# Patient Record
Sex: FEMALE | Race: WHITE | Employment: FULL TIME | URBAN - METROPOLITAN AREA
[De-identification: names, ages, dates, MRNs, and addresses within clinical notes are randomized per-mention and may not be internally consistent; named-entity substitution may affect disease eponyms.]

---

## 2017-01-11 PROCEDURE — 86317 IMMUNOASSAY INFECTIOUS AGENT: CPT | Performed by: INTERNAL MEDICINE

## 2017-01-11 PROCEDURE — 36415 COLL VENOUS BLD VENIPUNCTURE: CPT | Performed by: INTERNAL MEDICINE

## 2017-01-11 PROCEDURE — 86706 HEP B SURFACE ANTIBODY: CPT | Performed by: INTERNAL MEDICINE

## 2017-01-11 PROCEDURE — 86803 HEPATITIS C AB TEST: CPT | Performed by: INTERNAL MEDICINE

## 2017-01-11 PROCEDURE — 87340 HEPATITIS B SURFACE AG IA: CPT | Performed by: INTERNAL MEDICINE

## 2017-01-16 ENCOUNTER — HOSPITAL ENCOUNTER (OUTPATIENT)
Dept: CT IMAGING | Facility: HOSPITAL | Age: 42
Discharge: HOME | End: 2017-01-16
Attending: INTERNAL MEDICINE
Payer: COMMERCIAL

## 2017-01-16 DIAGNOSIS — R91.8 MULTIPLE PULMONARY NODULES: ICD-10-CM

## 2017-01-16 PROCEDURE — 71250 CT THORAX DX C-: CPT

## 2017-01-20 ENCOUNTER — LAB ENCOUNTER (OUTPATIENT)
Dept: LAB | Age: 42
End: 2017-01-20
Attending: INTERNAL MEDICINE
Payer: COMMERCIAL

## 2017-01-20 DIAGNOSIS — Z79.899 LONG-TERM USE OF HIGH-RISK MEDICATION: ICD-10-CM

## 2017-01-20 DIAGNOSIS — M31.30 NECROTIZING GRANULOMATOUS INFLAMMATION OF LUNG (HCC): ICD-10-CM

## 2017-01-20 LAB
ALBUMIN SERPL-MCNC: 3 G/DL (ref 3.5–4.8)
ALP LIVER SERPL-CCNC: 119 U/L (ref 37–98)
ALT SERPL-CCNC: 25 U/L (ref 14–54)
AST SERPL-CCNC: 17 U/L (ref 15–41)
BASOPHILS # BLD AUTO: 0.08 X10(3) UL (ref 0–0.1)
BASOPHILS NFR BLD AUTO: 0.8 %
BILIRUB SERPL-MCNC: 0.3 MG/DL (ref 0.1–2)
BUN BLD-MCNC: 9 MG/DL (ref 8–20)
CALCIUM BLD-MCNC: 8.7 MG/DL (ref 8.3–10.3)
CHLORIDE: 103 MMOL/L (ref 101–111)
CO2: 27 MMOL/L (ref 22–32)
CREAT BLD-MCNC: 0.89 MG/DL (ref 0.55–1.02)
EOSINOPHIL # BLD AUTO: 0.3 X10(3) UL (ref 0–0.3)
EOSINOPHIL NFR BLD AUTO: 2.8 %
ERYTHROCYTE [DISTWIDTH] IN BLOOD BY AUTOMATED COUNT: 13.2 % (ref 11.5–16)
GLUCOSE BLD-MCNC: 116 MG/DL (ref 70–99)
HCT VFR BLD AUTO: 38.3 % (ref 34–50)
HGB BLD-MCNC: 12.4 G/DL (ref 12–16)
IMMATURE GRANULOCYTE COUNT: 0.07 X10(3) UL (ref 0–1)
IMMATURE GRANULOCYTE RATIO %: 0.7 %
LYMPHOCYTES # BLD AUTO: 2.67 X10(3) UL (ref 0.9–4)
LYMPHOCYTES NFR BLD AUTO: 25.1 %
M PROTEIN MFR SERPL ELPH: 7.7 G/DL (ref 6.1–8.3)
MCH RBC QN AUTO: 29.8 PG (ref 27–33.2)
MCHC RBC AUTO-ENTMCNC: 32.4 G/DL (ref 31–37)
MCV RBC AUTO: 92.1 FL (ref 81–100)
MONOCYTES # BLD AUTO: 1.16 X10(3) UL (ref 0.1–0.6)
MONOCYTES NFR BLD AUTO: 10.9 %
NEUTROPHIL ABS PRELIM: 6.34 X10 (3) UL (ref 1.3–6.7)
NEUTROPHILS # BLD AUTO: 6.34 X10(3) UL (ref 1.3–6.7)
NEUTROPHILS NFR BLD AUTO: 59.7 %
PLATELET # BLD AUTO: 514 10(3)UL (ref 150–450)
POTASSIUM SERPL-SCNC: 4.3 MMOL/L (ref 3.6–5.1)
RBC # BLD AUTO: 4.16 X10(6)UL (ref 3.8–5.1)
RED CELL DISTRIBUTION WIDTH-SD: 45.1 FL (ref 35.1–46.3)
SODIUM SERPL-SCNC: 138 MMOL/L (ref 136–144)
WBC # BLD AUTO: 10.6 X10(3) UL (ref 4–13)

## 2017-01-20 PROCEDURE — 85025 COMPLETE CBC W/AUTO DIFF WBC: CPT

## 2017-01-20 PROCEDURE — 36415 COLL VENOUS BLD VENIPUNCTURE: CPT

## 2017-01-20 PROCEDURE — 80053 COMPREHEN METABOLIC PANEL: CPT

## 2017-01-23 RX ORDER — PYRIDOXINE HCL (VITAMIN B6) 50 MG
TABLET ORAL DAILY
COMMUNITY
End: 2017-11-21

## 2017-01-26 ENCOUNTER — HOSPITAL ENCOUNTER (EMERGENCY)
Facility: HOSPITAL | Age: 42
Discharge: HOME | End: 2017-01-26
Attending: EMERGENCY MEDICINE
Payer: COMMERCIAL

## 2017-01-26 VITALS
TEMPERATURE: 98 F | HEIGHT: 70 IN | OXYGEN SATURATION: 97 % | HEART RATE: 97 BPM | RESPIRATION RATE: 16 BRPM | WEIGHT: 199.94 LBS | BODY MASS INDEX: 28.62 KG/M2 | DIASTOLIC BLOOD PRESSURE: 67 MMHG | SYSTOLIC BLOOD PRESSURE: 97 MMHG

## 2017-01-26 DIAGNOSIS — T78.40XA ALLERGIC REACTION, INITIAL ENCOUNTER: Primary | ICD-10-CM

## 2017-01-26 PROCEDURE — 96374 THER/PROPH/DIAG INJ IV PUSH: CPT

## 2017-01-26 PROCEDURE — 99284 EMERGENCY DEPT VISIT MOD MDM: CPT

## 2017-01-26 PROCEDURE — 96375 TX/PRO/DX INJ NEW DRUG ADDON: CPT

## 2017-01-26 PROCEDURE — S0028 INJECTION, FAMOTIDINE, 20 MG: HCPCS

## 2017-01-26 RX ORDER — DIPHENHYDRAMINE HYDROCHLORIDE 50 MG/ML
25 INJECTION INTRAMUSCULAR; INTRAVENOUS ONCE
Status: COMPLETED | OUTPATIENT
Start: 2017-01-26 | End: 2017-01-26

## 2017-01-26 RX ORDER — METHYLPREDNISOLONE SODIUM SUCCINATE 125 MG/2ML
INJECTION, POWDER, LYOPHILIZED, FOR SOLUTION INTRAMUSCULAR; INTRAVENOUS
Status: COMPLETED
Start: 2017-01-26 | End: 2017-01-26

## 2017-01-26 RX ORDER — DIPHENHYDRAMINE HYDROCHLORIDE 50 MG/ML
25 INJECTION INTRAMUSCULAR; INTRAVENOUS ONCE
Status: DISCONTINUED | OUTPATIENT
Start: 2017-01-26 | End: 2017-01-26

## 2017-01-26 RX ORDER — METHYLPREDNISOLONE SODIUM SUCCINATE 125 MG/2ML
125 INJECTION, POWDER, LYOPHILIZED, FOR SOLUTION INTRAMUSCULAR; INTRAVENOUS ONCE
Status: DISCONTINUED | OUTPATIENT
Start: 2017-01-26 | End: 2017-01-26

## 2017-01-26 RX ORDER — DIPHENHYDRAMINE HYDROCHLORIDE 50 MG/ML
INJECTION INTRAMUSCULAR; INTRAVENOUS
Status: DISCONTINUED
Start: 2017-01-26 | End: 2017-01-26

## 2017-01-26 RX ORDER — PREDNISONE 20 MG/1
40 TABLET ORAL DAILY
Qty: 10 TABLET | Refills: 0 | Status: SHIPPED | OUTPATIENT
Start: 2017-01-26 | End: 2017-01-31

## 2017-01-26 RX ORDER — FAMOTIDINE 10 MG/ML
20 INJECTION, SOLUTION INTRAVENOUS ONCE
Status: DISCONTINUED | OUTPATIENT
Start: 2017-01-26 | End: 2017-01-26

## 2017-01-26 RX ORDER — FAMOTIDINE 10 MG/ML
INJECTION, SOLUTION INTRAVENOUS
Status: DISCONTINUED
Start: 2017-01-26 | End: 2017-01-26

## 2017-01-26 RX ORDER — METHYLPREDNISOLONE SODIUM SUCCINATE 125 MG/2ML
125 INJECTION, POWDER, LYOPHILIZED, FOR SOLUTION INTRAMUSCULAR; INTRAVENOUS ONCE
Status: COMPLETED | OUTPATIENT
Start: 2017-01-26 | End: 2017-01-26

## 2017-01-26 RX ORDER — FAMOTIDINE 10 MG/ML
20 INJECTION, SOLUTION INTRAVENOUS ONCE
Status: COMPLETED | OUTPATIENT
Start: 2017-01-26 | End: 2017-01-26

## 2017-01-26 NOTE — ED NOTES
Pt lying flat on stretcher denies sob or throat swelling at this time. Pt states she would like something to eat and drink but cannot have glutten. States her top lip feels slightly swollen.  momm at bs

## 2017-01-26 NOTE — ED INITIAL ASSESSMENT (HPI)
Pt here with c/o taking an autoimmune med for 2wks when MD told pt to increase the dose, pt developed welts , sob, nauseated lip swelling. Pt took benadryl x 2tabs PTA.

## 2017-01-26 NOTE — ED PROVIDER NOTES
Patient Seen in: [facility] Emergency Department    History   Patient presents with:   Allergic Rxn Allergies (immune)    Stated Complaint: Allergic reaction    HPI    40-year-old female that comes the hospital the chief complaint of having an itchy Oral Tab,  Take 1 tablet (50 mg total) by mouth daily. Patient taking differently: Take 100 mg by mouth daily. predniSONE 10 MG Oral Tab,  Take 10 mg by mouth daily as needed.      acetaminophen 325 MG Oral Tab,  Take 325 mg by mouth every 6 (six) hour 16  Ht 177.8 cm (5' 10\")  Wt 90.7 kg  BMI 28.69 kg/m2  SpO2 97%  LMP 12/26/2016        Physical Exam    HEENT : NCAT, EOMI, PEERL, throat clear, neck supple, no JVD, trachea midline, No LAD  Heart: S1S2 normal. No murmurs, regular rate and rhythm  Lungs: days        Medications Prescribed:  Current Discharge Medication List    START taking these medications    ! ! predniSONE 20 MG Oral Tab  Take 2 tablets (40 mg total) by mouth daily. Qty: 10 tablet Refills: 0    !! - Potential duplicate medications found.

## 2017-02-01 ENCOUNTER — HOSPITAL ENCOUNTER (OUTPATIENT)
Dept: CT IMAGING | Facility: HOSPITAL | Age: 42
Discharge: HOME | End: 2017-02-01
Attending: INTERNAL MEDICINE
Payer: COMMERCIAL

## 2017-02-01 ENCOUNTER — APPOINTMENT (OUTPATIENT)
Dept: LAB | Facility: HOSPITAL | Age: 42
End: 2017-02-01
Attending: INTERNAL MEDICINE
Payer: COMMERCIAL

## 2017-02-01 VITALS
RESPIRATION RATE: 16 BRPM | OXYGEN SATURATION: 98 % | HEART RATE: 63 BPM | WEIGHT: 200 LBS | SYSTOLIC BLOOD PRESSURE: 100 MMHG | HEIGHT: 69 IN | DIASTOLIC BLOOD PRESSURE: 76 MMHG | BODY MASS INDEX: 29.62 KG/M2

## 2017-02-01 DIAGNOSIS — R91.8 LUNG MASS: Primary | ICD-10-CM

## 2017-02-01 DIAGNOSIS — R91.8 LUNG MASS: ICD-10-CM

## 2017-02-01 DIAGNOSIS — R91.8 PULMONARY NODULES: ICD-10-CM

## 2017-02-01 LAB
INR BLD: 0.94 (ref 0.89–1.12)
PSA SERPL DL<=0.01 NG/ML-MCNC: 12.8 SECONDS (ref 12.3–14.8)

## 2017-02-01 PROCEDURE — 71250 CT THORAX DX C-: CPT

## 2017-02-01 PROCEDURE — 36415 COLL VENOUS BLD VENIPUNCTURE: CPT

## 2017-02-01 PROCEDURE — 85610 PROTHROMBIN TIME: CPT

## 2017-02-01 RX ORDER — MIDAZOLAM HYDROCHLORIDE 1 MG/ML
1 INJECTION INTRAMUSCULAR; INTRAVENOUS EVERY 5 MIN PRN
Status: ACTIVE | OUTPATIENT
Start: 2017-02-01 | End: 2017-02-01

## 2017-02-01 RX ORDER — SODIUM CHLORIDE 9 MG/ML
INJECTION, SOLUTION INTRAVENOUS CONTINUOUS
Status: DISCONTINUED | OUTPATIENT
Start: 2017-02-01 | End: 2017-02-05

## 2017-02-01 RX ORDER — MIDAZOLAM HYDROCHLORIDE 1 MG/ML
INJECTION INTRAMUSCULAR; INTRAVENOUS
Status: DISCONTINUED
Start: 2017-02-01 | End: 2017-02-01 | Stop reason: WASHOUT

## 2017-02-01 RX ORDER — FLUMAZENIL 0.1 MG/ML
0.2 INJECTION, SOLUTION INTRAVENOUS AS NEEDED
Status: DISCONTINUED | OUTPATIENT
Start: 2017-02-01 | End: 2017-02-05

## 2017-02-01 RX ORDER — NALOXONE HYDROCHLORIDE 0.4 MG/ML
80 INJECTION, SOLUTION INTRAMUSCULAR; INTRAVENOUS; SUBCUTANEOUS AS NEEDED
Status: DISCONTINUED | OUTPATIENT
Start: 2017-02-01 | End: 2017-02-05

## 2017-02-01 NOTE — IMAGING NOTE
Pt states she had a lung bx done 8/2016 and did so without any sedation. She would like to proceed and do this one without sedation as well, Dr Leeanna Henderson and Dr Jolene Coe at bedside and agree.  I will stay in CT in case the pt changes her mind or needs pain me

## 2017-03-07 ENCOUNTER — TELEPHONE (OUTPATIENT)
Dept: HEMATOLOGY/ONCOLOGY | Facility: HOSPITAL | Age: 42
End: 2017-03-07

## 2017-03-22 PROBLEM — M31.30 WEGENER'S GRANULOMATOSIS: Status: ACTIVE | Noted: 2017-03-22

## 2017-03-29 ENCOUNTER — OFFICE VISIT (OUTPATIENT)
Dept: HEMATOLOGY/ONCOLOGY | Facility: HOSPITAL | Age: 42
End: 2017-03-29
Attending: INTERNAL MEDICINE
Payer: COMMERCIAL

## 2017-03-29 VITALS
BODY MASS INDEX: 30.62 KG/M2 | OXYGEN SATURATION: 96 % | WEIGHT: 202 LBS | SYSTOLIC BLOOD PRESSURE: 108 MMHG | HEIGHT: 68 IN | TEMPERATURE: 99 F | DIASTOLIC BLOOD PRESSURE: 61 MMHG | HEART RATE: 108 BPM | RESPIRATION RATE: 18 BRPM

## 2017-03-29 DIAGNOSIS — M31.30 WEGENER'S GRANULOMATOSIS: Primary | ICD-10-CM

## 2017-03-29 LAB
BASOPHILS # BLD AUTO: 0.04 X10(3) UL (ref 0–0.1)
BASOPHILS NFR BLD AUTO: 0.5 %
EOSINOPHIL # BLD AUTO: 0.2 X10(3) UL (ref 0–0.3)
EOSINOPHIL NFR BLD AUTO: 2.3 %
ERYTHROCYTE [DISTWIDTH] IN BLOOD BY AUTOMATED COUNT: 14.1 % (ref 11.5–16)
HCT VFR BLD AUTO: 40.7 % (ref 34–50)
HGB BLD-MCNC: 13.5 G/DL (ref 12–16)
IMMATURE GRANULOCYTE COUNT: 0.04 X10(3) UL (ref 0–1)
IMMATURE GRANULOCYTE RATIO %: 0.5 %
LYMPHOCYTES # BLD AUTO: 2.63 X10(3) UL (ref 0.9–4)
LYMPHOCYTES NFR BLD AUTO: 30.2 %
MCH RBC QN AUTO: 30.5 PG (ref 27–33.2)
MCHC RBC AUTO-ENTMCNC: 33.2 G/DL (ref 31–37)
MCV RBC AUTO: 91.9 FL (ref 81–100)
MONOCYTES # BLD AUTO: 0.53 X10(3) UL (ref 0.1–0.6)
MONOCYTES NFR BLD AUTO: 6.1 %
NEUTROPHIL ABS PRELIM: 5.26 X10 (3) UL (ref 1.3–6.7)
NEUTROPHILS # BLD AUTO: 5.26 X10(3) UL (ref 1.3–6.7)
NEUTROPHILS NFR BLD AUTO: 60.4 %
PLATELET # BLD AUTO: 412 10(3)UL (ref 150–450)
RBC # BLD AUTO: 4.43 X10(6)UL (ref 3.8–5.1)
RED CELL DISTRIBUTION WIDTH-SD: 47.4 FL (ref 35.1–46.3)
WBC # BLD AUTO: 8.7 X10(3) UL (ref 4–13)

## 2017-03-29 PROCEDURE — 96413 CHEMO IV INFUSION 1 HR: CPT

## 2017-03-29 PROCEDURE — 96375 TX/PRO/DX INJ NEW DRUG ADDON: CPT

## 2017-03-29 PROCEDURE — 85025 COMPLETE CBC W/AUTO DIFF WBC: CPT

## 2017-03-29 PROCEDURE — 96376 TX/PRO/DX INJ SAME DRUG ADON: CPT

## 2017-03-29 PROCEDURE — 36415 COLL VENOUS BLD VENIPUNCTURE: CPT

## 2017-03-29 PROCEDURE — 96415 CHEMO IV INFUSION ADDL HR: CPT

## 2017-03-29 RX ORDER — DIPHENHYDRAMINE HYDROCHLORIDE 50 MG/ML
50 INJECTION INTRAMUSCULAR; INTRAVENOUS ONCE
Status: COMPLETED | OUTPATIENT
Start: 2017-03-29 | End: 2017-03-29

## 2017-03-29 RX ORDER — ACETAMINOPHEN 325 MG/1
650 TABLET ORAL ONCE
Status: COMPLETED | OUTPATIENT
Start: 2017-03-29 | End: 2017-03-29

## 2017-03-29 RX ORDER — DIPHENHYDRAMINE HCL 25 MG
25 CAPSULE ORAL ONCE
Status: COMPLETED | OUTPATIENT
Start: 2017-03-29 | End: 2017-03-29

## 2017-03-29 RX ADMIN — DIPHENHYDRAMINE HCL 25 MG: 25 MG CAPSULE ORAL at 09:30:00

## 2017-03-29 RX ADMIN — DIPHENHYDRAMINE HYDROCHLORIDE 25 MG: 50 INJECTION INTRAMUSCULAR; INTRAVENOUS at 11:43:00

## 2017-03-29 RX ADMIN — ACETAMINOPHEN 650 MG: 325 TABLET ORAL at 09:30:00

## 2017-03-29 NOTE — PROGRESS NOTES
1 hour 20 mins into rituxan infusion patient complained of itching and ears were red. Rituxan infusion stopped. VSS 98.9  20  130/85  96 Call placed to Dr. Skyler Zhao.  10 minutes later patient complains of feeling cold and patient looked flushed.    100 mg Solu

## 2017-04-12 ENCOUNTER — OFFICE VISIT (OUTPATIENT)
Dept: HEMATOLOGY/ONCOLOGY | Facility: HOSPITAL | Age: 42
End: 2017-04-12
Attending: INTERNAL MEDICINE
Payer: COMMERCIAL

## 2017-04-12 VITALS
OXYGEN SATURATION: 98 % | BODY MASS INDEX: 30.62 KG/M2 | TEMPERATURE: 98 F | HEART RATE: 93 BPM | SYSTOLIC BLOOD PRESSURE: 141 MMHG | RESPIRATION RATE: 18 BRPM | HEIGHT: 67.99 IN | WEIGHT: 202 LBS | DIASTOLIC BLOOD PRESSURE: 79 MMHG

## 2017-04-12 DIAGNOSIS — M31.30 WEGENER'S GRANULOMATOSIS: Primary | ICD-10-CM

## 2017-04-12 PROCEDURE — 96375 TX/PRO/DX INJ NEW DRUG ADDON: CPT

## 2017-04-12 PROCEDURE — 96413 CHEMO IV INFUSION 1 HR: CPT

## 2017-04-12 PROCEDURE — 85025 COMPLETE CBC W/AUTO DIFF WBC: CPT

## 2017-04-12 PROCEDURE — 96415 CHEMO IV INFUSION ADDL HR: CPT

## 2017-04-12 PROCEDURE — 36415 COLL VENOUS BLD VENIPUNCTURE: CPT

## 2017-04-12 RX ORDER — DIPHENHYDRAMINE HCL 25 MG
25 CAPSULE ORAL ONCE
Status: COMPLETED | OUTPATIENT
Start: 2017-04-12 | End: 2017-04-12

## 2017-04-12 RX ORDER — DIPHENHYDRAMINE HYDROCHLORIDE 50 MG/ML
25 INJECTION INTRAMUSCULAR; INTRAVENOUS ONCE
Status: DISCONTINUED | OUTPATIENT
Start: 2017-04-12 | End: 2017-04-12

## 2017-04-12 RX ORDER — METHYLPREDNISOLONE SODIUM SUCCINATE 125 MG/2ML
150 INJECTION, POWDER, LYOPHILIZED, FOR SOLUTION INTRAMUSCULAR; INTRAVENOUS ONCE
Status: COMPLETED | OUTPATIENT
Start: 2017-04-12 | End: 2017-04-12

## 2017-04-12 RX ORDER — ACETAMINOPHEN 325 MG/1
650 TABLET ORAL ONCE
Status: COMPLETED | OUTPATIENT
Start: 2017-04-12 | End: 2017-04-12

## 2017-04-12 RX ADMIN — METHYLPREDNISOLONE SODIUM SUCCINATE 150 MG: 125 INJECTION, POWDER, LYOPHILIZED, FOR SOLUTION INTRAMUSCULAR; INTRAVENOUS at 10:33:00

## 2017-04-12 RX ADMIN — ACETAMINOPHEN 650 MG: 325 TABLET ORAL at 10:32:00

## 2017-04-12 RX ADMIN — DIPHENHYDRAMINE HCL 25 MG: 25 MG CAPSULE ORAL at 10:32:00

## 2017-06-07 ENCOUNTER — APPOINTMENT (OUTPATIENT)
Dept: GENERAL RADIOLOGY | Age: 42
End: 2017-06-07
Attending: FAMILY MEDICINE
Payer: COMMERCIAL

## 2017-06-07 ENCOUNTER — HOSPITAL ENCOUNTER (OUTPATIENT)
Age: 42
Discharge: HOME | End: 2017-06-07
Attending: FAMILY MEDICINE
Payer: COMMERCIAL

## 2017-06-07 VITALS
HEIGHT: 69 IN | RESPIRATION RATE: 16 BRPM | OXYGEN SATURATION: 96 % | SYSTOLIC BLOOD PRESSURE: 127 MMHG | HEART RATE: 96 BPM | DIASTOLIC BLOOD PRESSURE: 73 MMHG | WEIGHT: 200 LBS | TEMPERATURE: 100 F | BODY MASS INDEX: 29.62 KG/M2

## 2017-06-07 DIAGNOSIS — J02.9 ACUTE PHARYNGITIS, UNSPECIFIED ETIOLOGY: ICD-10-CM

## 2017-06-07 DIAGNOSIS — J20.9 ACUTE BRONCHITIS, UNSPECIFIED ORGANISM: ICD-10-CM

## 2017-06-07 DIAGNOSIS — H66.90 ACUTE OTITIS MEDIA, UNSPECIFIED LATERALITY, UNSPECIFIED OTITIS MEDIA TYPE: Primary | ICD-10-CM

## 2017-06-07 PROCEDURE — 80047 BASIC METABLC PNL IONIZED CA: CPT

## 2017-06-07 PROCEDURE — 85025 COMPLETE CBC W/AUTO DIFF WBC: CPT | Performed by: FAMILY MEDICINE

## 2017-06-07 PROCEDURE — 87430 STREP A AG IA: CPT | Performed by: FAMILY MEDICINE

## 2017-06-07 PROCEDURE — 70360 X-RAY EXAM OF NECK: CPT | Performed by: FAMILY MEDICINE

## 2017-06-07 PROCEDURE — 99204 OFFICE O/P NEW MOD 45 MIN: CPT

## 2017-06-07 PROCEDURE — 99214 OFFICE O/P EST MOD 30 MIN: CPT

## 2017-06-07 PROCEDURE — 87081 CULTURE SCREEN ONLY: CPT | Performed by: FAMILY MEDICINE

## 2017-06-07 PROCEDURE — 71020 XR CHEST PA + LAT CHEST (CPT=71020): CPT | Performed by: FAMILY MEDICINE

## 2017-06-07 PROCEDURE — 36415 COLL VENOUS BLD VENIPUNCTURE: CPT

## 2017-06-07 PROCEDURE — 96372 THER/PROPH/DIAG INJ SC/IM: CPT

## 2017-06-07 RX ORDER — LEVOFLOXACIN 500 MG/1
500 TABLET, FILM COATED ORAL DAILY
Qty: 10 TABLET | Refills: 0 | Status: SHIPPED | OUTPATIENT
Start: 2017-06-07 | End: 2017-06-17

## 2017-06-07 RX ORDER — KETOROLAC TROMETHAMINE 30 MG/ML
30 INJECTION, SOLUTION INTRAMUSCULAR; INTRAVENOUS ONCE
Status: COMPLETED | OUTPATIENT
Start: 2017-06-07 | End: 2017-06-07

## 2017-06-07 RX ORDER — IBUPROFEN 600 MG/1
600 TABLET ORAL EVERY 8 HOURS PRN
Qty: 30 TABLET | Refills: 0 | Status: SHIPPED | OUTPATIENT
Start: 2017-06-07 | End: 2017-06-14

## 2017-06-07 NOTE — ED PROVIDER NOTES
Patient Seen in: [address] [facility]    History   Patient presents with:  Sore Throat  Sinus Problem    Stated Complaint: sore throat x 14 days sinus congestion    HPI    Blanca Doug is a 39year old female presents with chi Medications :   levofloxacin (LEVAQUIN) 500 MG Oral Tab,  Take 1 tablet (500 mg total) by mouth daily. ibuprofen 600 MG Oral Tab,  Take 1 tablet (600 mg total) by mouth every 8 (eight) hours as needed for Pain or Fever.    Cyanocobalamin (B-12) 25 %   O2 Device 06/07/17 1527 None (Room air)       Current:/73 mmHg  Pulse 113  Temp(Src) 100 °F (37.8 °C) (Temporal)  Resp 18  Ht 175.3 cm (5' 9\")  Wt 90.719 kg  BMI 29.52 kg/m2  SpO2 96%        Physical Exam   Constitutional: She appears well-devel pain.  Push fluids. If is any worsening throat pain, difficulty swallowing, drooling, opening the mother recommended to go to the nearest ER. Follow with primary care physician in 3-4 days for recheck.           Disposition and Plan     Clinical Impressio

## 2017-06-07 NOTE — ED INITIAL ASSESSMENT (HPI)
The patient is here with complaints of sinus congestion, a sore throat, and some ear discomfort. She did just got off of an ear plane for 9 hours and isn't sure if the ear pain is associated with the plane ride.   She saw the doctor on the cruise ship and

## 2017-07-15 ENCOUNTER — HOSPITAL ENCOUNTER (OUTPATIENT)
Age: 42
Discharge: HOME | End: 2017-07-15
Attending: FAMILY MEDICINE
Payer: COMMERCIAL

## 2017-07-15 VITALS
BODY MASS INDEX: 29.33 KG/M2 | WEIGHT: 198 LBS | OXYGEN SATURATION: 98 % | SYSTOLIC BLOOD PRESSURE: 119 MMHG | TEMPERATURE: 98 F | RESPIRATION RATE: 18 BRPM | DIASTOLIC BLOOD PRESSURE: 90 MMHG | HEART RATE: 94 BPM | HEIGHT: 69 IN

## 2017-07-15 DIAGNOSIS — H10.33 ACUTE CONJUNCTIVITIS OF BOTH EYES, UNSPECIFIED ACUTE CONJUNCTIVITIS TYPE: Primary | ICD-10-CM

## 2017-07-15 PROCEDURE — 99213 OFFICE O/P EST LOW 20 MIN: CPT

## 2017-07-15 PROCEDURE — 99214 OFFICE O/P EST MOD 30 MIN: CPT

## 2017-07-15 RX ORDER — TOBRAMYCIN 3 MG/ML
2 SOLUTION/ DROPS OPHTHALMIC EVERY 6 HOURS
Qty: 1 BOTTLE | Refills: 0 | Status: SHIPPED | OUTPATIENT
Start: 2017-07-15 | End: 2017-07-20

## 2017-07-15 NOTE — ED NOTES
[facility] called stating that they are out of stock of Tobramycin eye drops and wanted to substitute with Ofloxacin. Dr Onur Marrero approved the substitution of Ofloxacin 1-2 drops in both eyes every 6 hours for 5 days.

## 2017-07-15 NOTE — ED INITIAL ASSESSMENT (HPI)
Pt c/o bilateral eye pain, redness, swelling and irritation. Pt states that is started yesterday. Pt denies any trauma or injury. Pt states that she does wear contacts and has removed them. Pt is wearing her glasses.   Pt also noted yellow drainage from

## 2017-07-15 NOTE — ED PROVIDER NOTES
Patient Seen in: [address] [facility]    History   Patient presents with:  Eye Pain    Stated Complaint:     RASHAAD    Bryon Batista is a 39year old female presents with chief complaint of right eye redness associated with some g TAKE 1 TABLET BY MOUTH DAILY  Patient taking differently: TAKE 1 TABLET BY MOUTH DAILY PRN   Multiple Vitamins-Minerals (MULTIVITAMIN OR),  Take  by mouth. Cholecalciferol (VITAMIN D3) 5000 UNITS Oral Tab,  Take by mouth daily.      Levonorgest-Eth Milagros Rosas No data to display    ============================================================  ED Course  ------------------------------------------------------------  MDM   Finding consistent with acute conjunctivitis. Started on tobramycin eyedrops for 5 days.   Av

## 2017-09-25 ENCOUNTER — HOSPITAL ENCOUNTER (OUTPATIENT)
Dept: CT IMAGING | Age: 42
Discharge: HOME | End: 2017-09-25
Attending: INTERNAL MEDICINE
Payer: COMMERCIAL

## 2017-09-25 DIAGNOSIS — M31.30 WEGENER'S GRANULOMATOSIS: ICD-10-CM

## 2017-09-25 PROCEDURE — 71250 CT THORAX DX C-: CPT | Performed by: INTERNAL MEDICINE

## 2017-09-28 ENCOUNTER — TELEPHONE (OUTPATIENT)
Dept: FAMILY MEDICINE CLINIC | Facility: CLINIC | Age: 42
End: 2017-09-28

## 2017-09-28 NOTE — TELEPHONE ENCOUNTER
I received faxed labs from patient. These appear to have been done through work health screening. Platelets are mildly elevated at 383. LDL cholesterol mildly elevated at 109. Patient is due for complete physical.  Please schedule.

## 2017-09-29 PROCEDURE — 81001 URINALYSIS AUTO W/SCOPE: CPT | Performed by: INTERNAL MEDICINE

## 2017-09-29 PROCEDURE — 83876 ASSAY MYELOPEROXIDASE: CPT | Performed by: INTERNAL MEDICINE

## 2017-09-29 PROCEDURE — 84156 ASSAY OF PROTEIN URINE: CPT | Performed by: INTERNAL MEDICINE

## 2017-09-29 PROCEDURE — 82570 ASSAY OF URINE CREATININE: CPT | Performed by: INTERNAL MEDICINE

## 2017-10-26 ENCOUNTER — HOSPITAL ENCOUNTER (OUTPATIENT)
Age: 42
Discharge: HOME | End: 2017-10-26
Attending: FAMILY MEDICINE
Payer: COMMERCIAL

## 2017-10-26 ENCOUNTER — TELEPHONE (OUTPATIENT)
Dept: FAMILY MEDICINE CLINIC | Facility: CLINIC | Age: 42
End: 2017-10-26

## 2017-10-26 VITALS
OXYGEN SATURATION: 98 % | RESPIRATION RATE: 18 BRPM | DIASTOLIC BLOOD PRESSURE: 82 MMHG | WEIGHT: 195 LBS | TEMPERATURE: 98 F | HEART RATE: 87 BPM | SYSTOLIC BLOOD PRESSURE: 129 MMHG | BODY MASS INDEX: 29 KG/M2

## 2017-10-26 DIAGNOSIS — N60.01 BREAST CYST, RIGHT: ICD-10-CM

## 2017-10-26 DIAGNOSIS — Z87.898 PERSONAL HISTORY OF LYMPHATIC AND HEMATOPOIETIC NEOPLASM: Primary | ICD-10-CM

## 2017-10-26 DIAGNOSIS — J03.90 ACUTE TONSILLITIS, UNSPECIFIED ETIOLOGY: Primary | ICD-10-CM

## 2017-10-26 PROCEDURE — 99214 OFFICE O/P EST MOD 30 MIN: CPT

## 2017-10-26 PROCEDURE — 87430 STREP A AG IA: CPT | Performed by: FAMILY MEDICINE

## 2017-10-26 PROCEDURE — 87081 CULTURE SCREEN ONLY: CPT | Performed by: FAMILY MEDICINE

## 2017-10-26 PROCEDURE — 87430 STREP A AG IA: CPT

## 2017-10-26 RX ORDER — FLUTICASONE PROPIONATE 50 MCG
SPRAY, SUSPENSION (ML) NASAL DAILY
COMMUNITY
End: 2018-11-17 | Stop reason: ALTCHOICE

## 2017-10-26 RX ORDER — CETIRIZINE HYDROCHLORIDE, PSEUDOEPHEDRINE HYDROCHLORIDE 5; 120 MG/1; MG/1
1 TABLET, FILM COATED, EXTENDED RELEASE ORAL 2 TIMES DAILY
Qty: 20 TABLET | Refills: 0 | Status: SHIPPED | OUTPATIENT
Start: 2017-10-26 | End: 2017-11-21

## 2017-10-26 RX ORDER — AMOXICILLIN AND CLAVULANATE POTASSIUM 875; 125 MG/1; MG/1
1 TABLET, FILM COATED ORAL 2 TIMES DAILY
Qty: 20 TABLET | Refills: 0 | Status: SHIPPED | OUTPATIENT
Start: 2017-10-26 | End: 2017-11-05

## 2017-10-26 NOTE — TELEPHONE ENCOUNTER
I have left detailed msg on pt cell per hippa updating her on below. Asked to cb if questions.    Pt has mammo scheduled tomorrow

## 2017-10-26 NOTE — TELEPHONE ENCOUNTER
Fina-mammogram dept from [facility] to change order to bilat diagnostic with US (since due already)   Pt missed 6 mos f/u for R breast cyst (should have been done 5/2017, indicated for R breast cyst f/u)     Order pended    Her next OV for phys

## 2017-10-26 NOTE — ED INITIAL ASSESSMENT (HPI)
Pt c/o sore throat, body aches, sinus congestion, cough with productive light yellow to clear phlegm on occasion and \"fuzziness in ears\". Denies other symptoms.

## 2017-10-27 ENCOUNTER — HOSPITAL ENCOUNTER (OUTPATIENT)
Dept: MAMMOGRAPHY | Facility: HOSPITAL | Age: 42
Discharge: HOME | End: 2017-10-27
Attending: FAMILY MEDICINE
Payer: COMMERCIAL

## 2017-10-27 DIAGNOSIS — Z87.898 PERSONAL HISTORY OF LYMPHATIC AND HEMATOPOIETIC NEOPLASM: ICD-10-CM

## 2017-10-27 PROCEDURE — 77066 DX MAMMO INCL CAD BI: CPT | Performed by: FAMILY MEDICINE

## 2017-10-27 PROCEDURE — 76641 ULTRASOUND BREAST COMPLETE: CPT | Performed by: FAMILY MEDICINE

## 2017-10-27 PROCEDURE — 77062 BREAST TOMOSYNTHESIS BI: CPT | Performed by: FAMILY MEDICINE

## 2017-10-27 NOTE — ED PROVIDER NOTES
Patient Seen in: [address] [facility]    History   Patient presents with:  Sore Throat    Stated Complaint: sore throat, cough, body ache x4 days    HPI    Patient with a past medical history cinnamon for celiac disease, pleurisy, an Palpitations   • Cancer Neg    • Diabetes Brother        Smoking status: Never Smoker                                                              Smokeless tobacco: Never Used                      Alcohol use:  No                Review of Systems    Positi [address]on [address] [facility].  [phone]    Schedule an appointment as soon as possible for a visit  As needed, If symptoms worsen      Medications Prescribed:  Current Discharge Medication List    START taking these medicatio

## 2017-11-01 ENCOUNTER — APPOINTMENT (OUTPATIENT)
Dept: HEMATOLOGY/ONCOLOGY | Facility: HOSPITAL | Age: 42
End: 2017-11-01
Attending: INTERNAL MEDICINE
Payer: COMMERCIAL

## 2017-11-10 ENCOUNTER — TELEPHONE (OUTPATIENT)
Dept: HEMATOLOGY/ONCOLOGY | Facility: HOSPITAL | Age: 42
End: 2017-11-10

## 2017-11-10 ENCOUNTER — OFFICE VISIT (OUTPATIENT)
Dept: HEMATOLOGY/ONCOLOGY | Facility: HOSPITAL | Age: 42
End: 2017-11-10
Attending: INTERNAL MEDICINE
Payer: COMMERCIAL

## 2017-11-10 VITALS
WEIGHT: 198 LBS | DIASTOLIC BLOOD PRESSURE: 67 MMHG | OXYGEN SATURATION: 98 % | BODY MASS INDEX: 29.33 KG/M2 | HEIGHT: 69 IN | RESPIRATION RATE: 18 BRPM | SYSTOLIC BLOOD PRESSURE: 106 MMHG | TEMPERATURE: 98 F | HEART RATE: 92 BPM

## 2017-11-10 DIAGNOSIS — M31.30 NECROTIZING GRANULOMATOUS INFLAMMATION OF LUNG (HCC): ICD-10-CM

## 2017-11-10 DIAGNOSIS — Z79.899 LONG-TERM USE OF HIGH-RISK MEDICATION: ICD-10-CM

## 2017-11-10 LAB
ALBUMIN SERPL-MCNC: 3.2 G/DL (ref 3.5–4.8)
ALP LIVER SERPL-CCNC: 88 U/L (ref 37–98)
ALT SERPL-CCNC: 22 U/L (ref 14–54)
AST SERPL-CCNC: 8 U/L (ref 15–41)
BASOPHILS # BLD AUTO: 0.03 X10(3) UL (ref 0–0.1)
BASOPHILS NFR BLD AUTO: 0.2 %
BILIRUB SERPL-MCNC: 0.3 MG/DL (ref 0.1–2)
BUN BLD-MCNC: 8 MG/DL (ref 8–20)
CALCIUM BLD-MCNC: 9.3 MG/DL (ref 8.3–10.3)
CHLORIDE: 107 MMOL/L (ref 101–111)
CO2: 21 MMOL/L (ref 22–32)
CREAT BLD-MCNC: 0.82 MG/DL (ref 0.55–1.02)
EOSINOPHIL # BLD AUTO: 0.02 X10(3) UL (ref 0–0.3)
EOSINOPHIL NFR BLD AUTO: 0.1 %
ERYTHROCYTE [DISTWIDTH] IN BLOOD BY AUTOMATED COUNT: 13.1 % (ref 11.5–16)
GLUCOSE BLD-MCNC: 109 MG/DL (ref 70–99)
HCT VFR BLD AUTO: 39.9 % (ref 34–50)
HGB BLD-MCNC: 13.2 G/DL (ref 12–16)
IMMATURE GRANULOCYTE COUNT: 0.14 X10(3) UL (ref 0–1)
IMMATURE GRANULOCYTE RATIO %: 0.8 %
LYMPHOCYTES # BLD AUTO: 2.59 X10(3) UL (ref 0.9–4)
LYMPHOCYTES NFR BLD AUTO: 14 %
M PROTEIN MFR SERPL ELPH: 7.2 G/DL (ref 6.1–8.3)
MCH RBC QN AUTO: 30.4 PG (ref 27–33.2)
MCHC RBC AUTO-ENTMCNC: 33.1 G/DL (ref 31–37)
MCV RBC AUTO: 91.9 FL (ref 81–100)
MONOCYTES # BLD AUTO: 1.21 X10(3) UL (ref 0.1–0.6)
MONOCYTES NFR BLD AUTO: 6.5 %
NEUTROPHIL ABS PRELIM: 14.5 X10 (3) UL (ref 1.3–6.7)
NEUTROPHILS # BLD AUTO: 14.5 X10(3) UL (ref 1.3–6.7)
NEUTROPHILS NFR BLD AUTO: 78.4 %
PLATELET # BLD AUTO: 442 10(3)UL (ref 150–450)
POTASSIUM SERPL-SCNC: 3.8 MMOL/L (ref 3.6–5.1)
RBC # BLD AUTO: 4.34 X10(6)UL (ref 3.8–5.1)
RED CELL DISTRIBUTION WIDTH-SD: 44 FL (ref 35.1–46.3)
SODIUM SERPL-SCNC: 139 MMOL/L (ref 136–144)
WBC # BLD AUTO: 18.5 X10(3) UL (ref 4–13)

## 2017-11-10 PROCEDURE — 85025 COMPLETE CBC W/AUTO DIFF WBC: CPT

## 2017-11-10 PROCEDURE — 80053 COMPREHEN METABOLIC PANEL: CPT

## 2017-11-10 NOTE — TELEPHONE ENCOUNTER
*late telephone entry*    Attempted to contact pt multiple times, NA- need to know if she had dental procedure on 11/9 and if she will be receiving Rituxan on 11/10.

## 2017-11-13 NOTE — PROGRESS NOTES
Viewed by Guzman Mercer on 11/12/2017  7:41 AM   Written by Vinicius Epstein MD on 11/11/2017  9:56 PM

## 2017-11-17 ENCOUNTER — OFFICE VISIT (OUTPATIENT)
Dept: FAMILY MEDICINE CLINIC | Facility: CLINIC | Age: 42
End: 2017-11-17

## 2017-11-17 VITALS
HEIGHT: 68.5 IN | HEART RATE: 64 BPM | BODY MASS INDEX: 30.11 KG/M2 | WEIGHT: 201 LBS | DIASTOLIC BLOOD PRESSURE: 74 MMHG | RESPIRATION RATE: 12 BRPM | SYSTOLIC BLOOD PRESSURE: 118 MMHG

## 2017-11-17 DIAGNOSIS — E55.9 VITAMIN D DEFICIENCY: ICD-10-CM

## 2017-11-17 DIAGNOSIS — Z00.00 BLOOD TESTS FOR ROUTINE GENERAL PHYSICAL EXAMINATION: ICD-10-CM

## 2017-11-17 DIAGNOSIS — E53.8 VITAMIN B12 DEFICIENCY: ICD-10-CM

## 2017-11-17 DIAGNOSIS — Z00.00 ROUTINE GENERAL MEDICAL EXAMINATION AT A HEALTH CARE FACILITY: Primary | ICD-10-CM

## 2017-11-17 PROCEDURE — 99396 PREV VISIT EST AGE 40-64: CPT | Performed by: FAMILY MEDICINE

## 2017-11-17 NOTE — PROGRESS NOTES
CHIEF COMPLAINT   Complete physical  HPI:   Piyush Denis is a 43year old female who presents for a complete physical exam.   Taking vitamin D and vitamin B12 daily. Believes she is on 5,000 IU vitamin D daily. Following gluten free diet.     Dr. Jose Hernandez Rfl:    Multiple Minerals-Vitamins (CITRACAL PLUS) Oral Tab Take 2 tablets by mouth daily. Disp:  Rfl:    Oxybutynin Chloride (DITROPAN) 5 MG Oral Tab Take 1 tablet by mouth daily.  Disp: 90 tablet Rfl: 3   [facility] ALLERGY RELIEF 180 MG Oral Tab TAKE 1 TABLET B • Heart Disease Mother    • Diabetes Brother    • High Cholesterol Father      Hypercholesterolemia   • Heart Disorder Father      Palpitations   • Diabetes Brother    • Cancer Neg       Social History:   Smoking status: Never Smoker is 30.12 kg/m². Aaliyah Kern Recommended low fat diet and aerobic exercise 30 minutes three times weekly. The patient indicates understanding of these issues and agrees to the plan.

## 2017-11-18 ENCOUNTER — PATIENT MESSAGE (OUTPATIENT)
Dept: FAMILY MEDICINE CLINIC | Facility: CLINIC | Age: 42
End: 2017-11-18

## 2017-11-21 ENCOUNTER — OFFICE VISIT (OUTPATIENT)
Dept: HEMATOLOGY/ONCOLOGY | Facility: HOSPITAL | Age: 42
End: 2017-11-21
Attending: INTERNAL MEDICINE
Payer: COMMERCIAL

## 2017-11-21 VITALS
BODY MASS INDEX: 29.89 KG/M2 | WEIGHT: 199.5 LBS | RESPIRATION RATE: 18 BRPM | SYSTOLIC BLOOD PRESSURE: 124 MMHG | HEART RATE: 84 BPM | HEIGHT: 68.5 IN | OXYGEN SATURATION: 100 % | DIASTOLIC BLOOD PRESSURE: 79 MMHG

## 2017-11-21 DIAGNOSIS — M31.30 WEGENER'S GRANULOMATOSIS: ICD-10-CM

## 2017-11-21 DIAGNOSIS — M31.30 WEGENER'S DISEASE, PULMONARY: Primary | ICD-10-CM

## 2017-11-21 PROCEDURE — 36415 COLL VENOUS BLD VENIPUNCTURE: CPT

## 2017-11-21 PROCEDURE — 96415 CHEMO IV INFUSION ADDL HR: CPT

## 2017-11-21 PROCEDURE — 96413 CHEMO IV INFUSION 1 HR: CPT

## 2017-11-21 PROCEDURE — 85025 COMPLETE CBC W/AUTO DIFF WBC: CPT

## 2017-11-21 RX ORDER — DIPHENHYDRAMINE HCL 25 MG
25 CAPSULE ORAL ONCE
Status: CANCELLED | OUTPATIENT
Start: 2017-11-21

## 2017-11-21 RX ORDER — ACETAMINOPHEN 325 MG/1
650 TABLET ORAL ONCE
Status: COMPLETED | OUTPATIENT
Start: 2017-11-21 | End: 2017-11-21

## 2017-11-21 RX ORDER — DIPHENHYDRAMINE HCL 25 MG
25 CAPSULE ORAL ONCE
Status: COMPLETED | OUTPATIENT
Start: 2017-11-21 | End: 2017-11-21

## 2017-11-21 RX ORDER — ACETAMINOPHEN 325 MG/1
650 TABLET ORAL ONCE
Status: CANCELLED | OUTPATIENT
Start: 2017-11-21

## 2017-11-21 RX ADMIN — ACETAMINOPHEN 650 MG: 325 TABLET ORAL at 09:28:00

## 2017-11-21 RX ADMIN — DIPHENHYDRAMINE HCL 25 MG: 25 MG CAPSULE ORAL at 09:28:00

## 2017-11-21 NOTE — PROGRESS NOTES
Spoke w/ Dr. Heidy Archuleta office regarding clarification of orders. Will wait to see how patient does after today's infusion to determine future doses. Confirmed that no further appointments to be scheduled at this time. Patient understands.     Education Record

## 2017-11-21 NOTE — TELEPHONE ENCOUNTER
From: Luciana Henao  To: Flor Babin PA-C  Sent: 11/18/2017 8:03 PM CST  Subject: Other    Ryder Singh. ..  I realize that I didn't get the contact information for a pediatric cardiologist recommendation for my daughter on Friday before I lef

## 2017-12-27 PROBLEM — J04.30 SUPRAGLOTTITIS WITHOUT AIRWAY OBSTRUCTION: Status: ACTIVE | Noted: 2017-12-27

## 2017-12-27 PROBLEM — J02.9 SORE THROAT: Status: ACTIVE | Noted: 2017-12-27

## 2018-01-03 RX ORDER — OXYBUTYNIN CHLORIDE 5 MG/1
TABLET, EXTENDED RELEASE ORAL
Qty: 90 TABLET | Refills: 1 | OUTPATIENT
Start: 2018-01-03

## 2018-01-08 ENCOUNTER — TELEPHONE (OUTPATIENT)
Dept: UROLOGY | Facility: HOSPITAL | Age: 43
End: 2018-01-08

## 2018-01-08 RX ORDER — OXYBUTYNIN CHLORIDE 5 MG/1
5 TABLET ORAL DAILY
Qty: 90 TABLET | Refills: 3 | Status: SHIPPED | OUTPATIENT
Start: 2018-01-08 | End: 2018-01-08 | Stop reason: CLARIF

## 2018-01-08 RX ORDER — OXYBUTYNIN CHLORIDE 5 MG/1
5 TABLET, EXTENDED RELEASE ORAL DAILY
COMMUNITY
End: 2019-01-08

## 2018-01-08 NOTE — TELEPHONE ENCOUNTER
Pt phoned and asking why she needed to make appt for refills. Pt states everything is going fine. Discussed w/ pt that she hadn't seen Dr Tomas Bernstein in 2 1/2 yrs. Pt states she sees her gyne yearly.  Discussed if gyne was willing to call in oxybutynin, she didn't ne

## 2018-01-19 PROCEDURE — 86255 FLUORESCENT ANTIBODY SCREEN: CPT | Performed by: INTERNAL MEDICINE

## 2018-01-19 PROCEDURE — 83876 ASSAY MYELOPEROXIDASE: CPT | Performed by: INTERNAL MEDICINE

## 2018-01-26 PROBLEM — J03.91 RECURRENT TONSILLITIS: Status: ACTIVE | Noted: 2018-01-26

## 2018-01-29 RX ORDER — PREDNISONE 10 MG/1
10 TABLET ORAL DAILY PRN
COMMUNITY
End: 2019-03-25 | Stop reason: ALTCHOICE

## 2018-01-31 ENCOUNTER — TELEPHONE (OUTPATIENT)
Dept: FAMILY MEDICINE CLINIC | Facility: CLINIC | Age: 43
End: 2018-01-31

## 2018-01-31 NOTE — TELEPHONE ENCOUNTER
Incoming fax received from [address] [employer] [facility]. Medical Clearance request.  Patient is scheduled to have Tonsillectomy on 2/13/2018 with Dr. Manjeet Batista. Outgoing call to patient, LM to CB.

## 2018-02-08 ENCOUNTER — OFFICE VISIT (OUTPATIENT)
Dept: FAMILY MEDICINE CLINIC | Facility: CLINIC | Age: 43
End: 2018-02-08

## 2018-02-08 VITALS
TEMPERATURE: 98 F | RESPIRATION RATE: 18 BRPM | WEIGHT: 202 LBS | BODY MASS INDEX: 30.26 KG/M2 | HEIGHT: 68.5 IN | SYSTOLIC BLOOD PRESSURE: 98 MMHG | DIASTOLIC BLOOD PRESSURE: 66 MMHG | HEART RATE: 66 BPM

## 2018-02-08 DIAGNOSIS — Z01.818 PREOPERATIVE CLEARANCE: Primary | ICD-10-CM

## 2018-02-08 DIAGNOSIS — M31.30 WEGENER'S GRANULOMATOSIS: ICD-10-CM

## 2018-02-08 DIAGNOSIS — J04.30 SUPRAGLOTTITIS WITHOUT AIRWAY OBSTRUCTION: ICD-10-CM

## 2018-02-08 PROCEDURE — 99242 OFF/OP CONSLTJ NEW/EST SF 20: CPT | Performed by: FAMILY MEDICINE

## 2018-02-08 NOTE — PROGRESS NOTES
[facility] Family Medicine Office Note  Chief Complaint:   Patient presents with:  Pre-Op Exam: 2/13/18 tonsil removal       HPI:   This is a 43year old female coming in for preop clearance for tonsillectomy to be done on February 13, 2018 with Disorder Father      Palpitations   • Diabetes Brother    • Cancer Neg      Allergies:    Azathioprine            Rash    Comment:Nausea, diffuse, lip swelling, low BP  Gluten Flour                Comment:Gets very tired from gluten in foods and then will Pulse 66   Temp (!) 97.5 °F (36.4 °C) (Oral)   Resp 18   Ht 68.5\"   Wt 202 lb   LMP 01/29/2018 (Approximate)   BMI 30.27 kg/m²  Estimated body mass index is 30.27 kg/m² as calculated from the following:    Height as of this encounter: 68.5\".     Weight as Patient is notified to call with any questions, complications, allergies, or worsening or changing symptoms. Patient is to call with any side effects or complications from the treatments as a result of today.      Problem List:  Patient Active Problem List

## 2018-02-13 ENCOUNTER — ANESTHESIA (OUTPATIENT)
Dept: SURGERY | Facility: HOSPITAL | Age: 43
End: 2018-02-13
Payer: COMMERCIAL

## 2018-02-13 ENCOUNTER — HOSPITAL ENCOUNTER (OUTPATIENT)
Facility: HOSPITAL | Age: 43
Setting detail: HOSPITAL OUTPATIENT SURGERY
Discharge: HOME | End: 2018-02-13
Attending: OTOLARYNGOLOGY | Admitting: OTOLARYNGOLOGY
Payer: COMMERCIAL

## 2018-02-13 ENCOUNTER — ANESTHESIA EVENT (OUTPATIENT)
Dept: SURGERY | Facility: HOSPITAL | Age: 43
End: 2018-02-13
Payer: COMMERCIAL

## 2018-02-13 ENCOUNTER — SURGERY (OUTPATIENT)
Age: 43
End: 2018-02-13

## 2018-02-13 VITALS
HEART RATE: 83 BPM | HEIGHT: 68 IN | DIASTOLIC BLOOD PRESSURE: 82 MMHG | TEMPERATURE: 98 F | OXYGEN SATURATION: 100 % | RESPIRATION RATE: 16 BRPM | WEIGHT: 198 LBS | BODY MASS INDEX: 30.01 KG/M2 | SYSTOLIC BLOOD PRESSURE: 118 MMHG

## 2018-02-13 DIAGNOSIS — J03.91 RECURRENT TONSILLITIS: ICD-10-CM

## 2018-02-13 DIAGNOSIS — J02.9 SORE THROAT: ICD-10-CM

## 2018-02-13 DIAGNOSIS — J04.30 SUPRAGLOTTITIS WITHOUT AIRWAY OBSTRUCTION: ICD-10-CM

## 2018-02-13 LAB
POCT LOT NUMBER: NORMAL
POCT URINE PREGNANCY: NEGATIVE

## 2018-02-13 PROCEDURE — 81025 URINE PREGNANCY TEST: CPT | Performed by: OTOLARYNGOLOGY

## 2018-02-13 PROCEDURE — 88304 TISSUE EXAM BY PATHOLOGIST: CPT | Performed by: OTOLARYNGOLOGY

## 2018-02-13 PROCEDURE — 0C5PXZZ DESTRUCTION OF TONSILS, EXTERNAL APPROACH: ICD-10-PCS | Performed by: OTOLARYNGOLOGY

## 2018-02-13 RX ORDER — ONDANSETRON 2 MG/ML
4 INJECTION INTRAMUSCULAR; INTRAVENOUS AS NEEDED
Status: DISCONTINUED | OUTPATIENT
Start: 2018-02-13 | End: 2018-02-13

## 2018-02-13 RX ORDER — MEPERIDINE HYDROCHLORIDE 25 MG/ML
12.5 INJECTION INTRAMUSCULAR; INTRAVENOUS; SUBCUTANEOUS AS NEEDED
Status: DISCONTINUED | OUTPATIENT
Start: 2018-02-13 | End: 2018-02-13

## 2018-02-13 RX ORDER — SCOLOPAMINE TRANSDERMAL SYSTEM 1 MG/1
PATCH, EXTENDED RELEASE TRANSDERMAL
Status: DISCONTINUED
Start: 2018-02-13 | End: 2018-02-13

## 2018-02-13 RX ORDER — BUPIVACAINE HYDROCHLORIDE 2.5 MG/ML
INJECTION, SOLUTION EPIDURAL; INFILTRATION; INTRACAUDAL AS NEEDED
Status: DISCONTINUED | OUTPATIENT
Start: 2018-02-13 | End: 2018-02-13 | Stop reason: HOSPADM

## 2018-02-13 RX ORDER — NALOXONE HYDROCHLORIDE 0.4 MG/ML
80 INJECTION, SOLUTION INTRAMUSCULAR; INTRAVENOUS; SUBCUTANEOUS AS NEEDED
Status: DISCONTINUED | OUTPATIENT
Start: 2018-02-13 | End: 2018-02-13

## 2018-02-13 RX ORDER — DEXAMETHASONE 2 MG/1
TABLET ORAL
Qty: 10 TABLET | Refills: 0 | Status: SHIPPED | OUTPATIENT
Start: 2018-02-13 | End: 2018-11-17 | Stop reason: ALTCHOICE

## 2018-02-13 RX ORDER — MIDAZOLAM HYDROCHLORIDE 1 MG/ML
1 INJECTION INTRAMUSCULAR; INTRAVENOUS EVERY 5 MIN PRN
Status: DISCONTINUED | OUTPATIENT
Start: 2018-02-13 | End: 2018-02-13

## 2018-02-13 RX ORDER — SODIUM CHLORIDE, SODIUM LACTATE, POTASSIUM CHLORIDE, CALCIUM CHLORIDE 600; 310; 30; 20 MG/100ML; MG/100ML; MG/100ML; MG/100ML
INJECTION, SOLUTION INTRAVENOUS CONTINUOUS
Status: DISCONTINUED | OUTPATIENT
Start: 2018-02-13 | End: 2018-02-13

## 2018-02-13 RX ORDER — SCOLOPAMINE TRANSDERMAL SYSTEM 1 MG/1
1 PATCH, EXTENDED RELEASE TRANSDERMAL
Status: DISCONTINUED | OUTPATIENT
Start: 2018-02-13 | End: 2018-02-13

## 2018-02-13 NOTE — OPERATIVE REPORT
[address] Patient Status:  Hospital Outpatient Surgery    1975 MRN [id number]   [facility] SURGERY Attending Antolin Aleman MD   [facility] Day # 0 PCP Katelyn Mancia MD     Tonsillectomy Op Note  Pre-Op Anusha without complications. The sponge, needle and instrument counts were correct at the end of the case. There were no complications. I performed all parts of this procedure.   EBL:  5cc  IVF:  100cc LR  Specimens:  Bilateral tonsils to path  UO: None  Condi

## 2018-02-13 NOTE — ANESTHESIA POSTPROCEDURE EVALUATION
[address] Patient Status:  Hospital Outpatient Surgery   Age/Gender 43year old female MRN [id number]   Location [address] [place] [address] Attending Live Estevez MD   [facility] Day # 0 PCP Herbert Rico,

## 2018-02-13 NOTE — DISCHARGE SUMMARY
Surgeon Discharge Summary     Patient ID:  Mason Castrejon  [id number]  43year old  9/12/1975    Admit date: 2/13/2018    Discharge date and time: No discharge date for patient encounter.      Attending Physician: Kelly Knight MD     Reason for admis

## 2018-02-13 NOTE — ANESTHESIA PREPROCEDURE EVALUATION
PRE-OP EVALUATION    Patient Name: Genesis Murphy    Pre-op Diagnosis: Sore throat [J02.9]  Recurrent tonsillitis [J03.91]  Supraglottitis without airway obstruction [J04.30]    Procedure(s):  BILATERAL TONSILLECTOMY    Surgeon(s) and Role:     Prosper Cannon Evaluation    Patient summary reviewed. Anesthetic Complications  (-) history of anesthetic complications         GI/Hepatic/Renal    Negative GI/hepatic/renal ROS. Cardiovascular    Negative cardiovascular ROS.   ECG reviewed surgeon and patient.       Plan/risks discussed with: patient                Present on Admission:  **None**

## 2018-03-09 ENCOUNTER — OFFICE VISIT (OUTPATIENT)
Dept: UROLOGY | Facility: HOSPITAL | Age: 43
End: 2018-03-09
Attending: OBSTETRICS & GYNECOLOGY
Payer: COMMERCIAL

## 2018-03-09 ENCOUNTER — HOSPITAL ENCOUNTER (OUTPATIENT)
Dept: CT IMAGING | Facility: HOSPITAL | Age: 43
End: 2018-03-09
Attending: INTERNAL MEDICINE
Payer: COMMERCIAL

## 2018-03-09 VITALS
HEIGHT: 68 IN | BODY MASS INDEX: 29.1 KG/M2 | WEIGHT: 192 LBS | DIASTOLIC BLOOD PRESSURE: 70 MMHG | SYSTOLIC BLOOD PRESSURE: 110 MMHG

## 2018-03-09 DIAGNOSIS — N39.41 URGE INCONTINENCE: Primary | ICD-10-CM

## 2018-03-09 DIAGNOSIS — N81.84 PELVIC MUSCLE WASTING: ICD-10-CM

## 2018-03-09 PROCEDURE — 99211 OFF/OP EST MAY X REQ PHY/QHP: CPT

## 2018-03-09 NOTE — PROGRESS NOTES
Patient presents to follow up OAB    She is currently using oxybutynin ER 5mg daily    She reports +improvement and reports minimal dry mouth and denies constipation  Denies other significant side effects     Happy with improvements    Voids freely  Bowels

## 2018-04-06 ENCOUNTER — HOSPITAL ENCOUNTER (OUTPATIENT)
Dept: CT IMAGING | Facility: HOSPITAL | Age: 43
Discharge: HOME | End: 2018-04-06
Attending: INTERNAL MEDICINE
Payer: COMMERCIAL

## 2018-04-06 DIAGNOSIS — M31.30 WEGENER'S GRANULOMATOSIS: ICD-10-CM

## 2018-04-06 DIAGNOSIS — R91.1 LUNG NODULE: ICD-10-CM

## 2018-04-06 PROCEDURE — 71250 CT THORAX DX C-: CPT | Performed by: INTERNAL MEDICINE

## 2018-04-20 PROCEDURE — 86255 FLUORESCENT ANTIBODY SCREEN: CPT | Performed by: INTERNAL MEDICINE

## 2018-04-20 PROCEDURE — 83876 ASSAY MYELOPEROXIDASE: CPT | Performed by: INTERNAL MEDICINE

## 2018-05-11 ENCOUNTER — TELEPHONE (OUTPATIENT)
Dept: FAMILY MEDICINE CLINIC | Facility: CLINIC | Age: 43
End: 2018-05-11

## 2018-05-11 DIAGNOSIS — R79.89 ABNORMAL CBC: Primary | ICD-10-CM

## 2018-05-11 NOTE — TELEPHONE ENCOUNTER
Received faxed labs from [employer] from health screening. Platelets mildly elevated. Recheck CBC in the next month.

## 2018-05-15 NOTE — TELEPHONE ENCOUNTER
Call to pt's cell reaches identified voice mail. per hipaa consent left vmm req call back to triage nurse tomorrow for nelsy GOLDSTEIN comments re test results and instructions. Provided ofc phone hours. [facility] future lab order placed.

## 2018-05-18 ENCOUNTER — RT VISIT (OUTPATIENT)
Dept: RESPIRATORY THERAPY | Facility: HOSPITAL | Age: 43
End: 2018-05-18
Attending: INTERNAL MEDICINE
Payer: COMMERCIAL

## 2018-05-18 DIAGNOSIS — R91.8 PULMONARY NODULES: ICD-10-CM

## 2018-05-18 PROCEDURE — 94729 DIFFUSING CAPACITY: CPT

## 2018-05-18 PROCEDURE — 94726 PLETHYSMOGRAPHY LUNG VOLUMES: CPT

## 2018-05-18 PROCEDURE — 94010 BREATHING CAPACITY TEST: CPT

## 2018-05-22 NOTE — PROCEDURES
Spirometry, flow volume loop, lung volumes are all within normal limits. No evidence of restriction or obstruction.    There is mild increased RV / TLC ratio with normal TLC , suggestive of mild air trapping   The diffusing capacity is normal

## 2018-07-29 ENCOUNTER — HOSPITAL ENCOUNTER (OUTPATIENT)
Age: 43
Discharge: HOME | End: 2018-07-29
Attending: FAMILY MEDICINE
Payer: COMMERCIAL

## 2018-07-29 VITALS
WEIGHT: 192 LBS | RESPIRATION RATE: 18 BRPM | SYSTOLIC BLOOD PRESSURE: 118 MMHG | OXYGEN SATURATION: 98 % | BODY MASS INDEX: 29 KG/M2 | HEART RATE: 94 BPM | DIASTOLIC BLOOD PRESSURE: 78 MMHG | TEMPERATURE: 99 F

## 2018-07-29 DIAGNOSIS — N39.0 URINARY TRACT INFECTION WITH HEMATURIA, SITE UNSPECIFIED: Primary | ICD-10-CM

## 2018-07-29 DIAGNOSIS — R31.9 URINARY TRACT INFECTION WITH HEMATURIA, SITE UNSPECIFIED: Primary | ICD-10-CM

## 2018-07-29 LAB
POCT BILIRUBIN URINE: NEGATIVE
POCT GLUCOSE URINE: NEGATIVE MG/DL
POCT KETONE URINE: NEGATIVE MG/DL
POCT NITRITE URINE: NEGATIVE
POCT PH URINE: 5.5 (ref 5–8)
POCT PROTEIN URINE: NEGATIVE MG/DL
POCT SPECIFIC GRAVITY URINE: 1.01
POCT URINE COLOR: YELLOW
POCT URINE PREGNANCY: NEGATIVE
POCT UROBILINOGEN URINE: 0.2 MG/DL

## 2018-07-29 PROCEDURE — 87086 URINE CULTURE/COLONY COUNT: CPT | Performed by: FAMILY MEDICINE

## 2018-07-29 PROCEDURE — 99214 OFFICE O/P EST MOD 30 MIN: CPT

## 2018-07-29 PROCEDURE — 81025 URINE PREGNANCY TEST: CPT | Performed by: FAMILY MEDICINE

## 2018-07-29 PROCEDURE — 81002 URINALYSIS NONAUTO W/O SCOPE: CPT | Performed by: FAMILY MEDICINE

## 2018-07-29 RX ORDER — NITROFURANTOIN 25; 75 MG/1; MG/1
100 CAPSULE ORAL 2 TIMES DAILY
Qty: 20 CAPSULE | Refills: 0 | Status: SHIPPED | OUTPATIENT
Start: 2018-07-29 | End: 2018-08-08

## 2018-07-29 NOTE — ED PROVIDER NOTES
Patient Seen in: [address] [facility]    History   Patient presents with:  Urinary Symptoms (urologic)    Stated Complaint: UTI x 1 day    HPI  49-year-old female presents to immediate care with a 1 day history of frequency and mild ED Triage Vitals [07/29/18 1321]  BP: 118/78  Pulse: 94  Resp: 18  Temp: 98.6 °F (37 °C)  Temp src: Temporal  SpO2: 98 %  O2 Device: None (Room air)    Current:/78   Pulse 94   Temp 98.6 °F (37 °C) (Temporal)   Resp 18   Wt 87.1 kg   SpO2 98%   BMI antibiotics. Otherwise, take all your medications until completed. See your doctor in 2-3 days if not better.     Disposition:  Discharge  7/29/2018  1:33 pm    Follow-up:  Shorty Howell MD  [address] [place] [address] [phone]

## 2018-08-13 ENCOUNTER — LAB ENCOUNTER (OUTPATIENT)
Dept: LAB | Age: 43
End: 2018-08-13
Attending: FAMILY MEDICINE
Payer: COMMERCIAL

## 2018-08-13 DIAGNOSIS — M31.30 WEGENER'S GRANULOMATOSIS: ICD-10-CM

## 2018-08-13 DIAGNOSIS — E55.9 VITAMIN D DEFICIENCY: ICD-10-CM

## 2018-08-13 DIAGNOSIS — R79.89 ABNORMAL CBC: ICD-10-CM

## 2018-08-13 DIAGNOSIS — E53.8 VITAMIN B12 DEFICIENCY: ICD-10-CM

## 2018-08-13 LAB
ALBUMIN SERPL-MCNC: 3.1 G/DL (ref 3.5–4.8)
ALBUMIN/GLOB SERPL: 0.8 {RATIO} (ref 1–2)
ALP LIVER SERPL-CCNC: 88 U/L (ref 37–98)
ALT SERPL-CCNC: 22 U/L (ref 14–54)
ANION GAP SERPL CALC-SCNC: 6 MMOL/L (ref 0–18)
AST SERPL-CCNC: 13 U/L (ref 15–41)
BASOPHILS # BLD AUTO: 0.04 X10(3) UL (ref 0–0.1)
BASOPHILS NFR BLD AUTO: 0.4 %
BILIRUB SERPL-MCNC: 0.2 MG/DL (ref 0.1–2)
BUN BLD-MCNC: 9 MG/DL (ref 8–20)
BUN/CREAT SERPL: 13.2 (ref 10–20)
CALCIUM BLD-MCNC: 8.7 MG/DL (ref 8.3–10.3)
CHLORIDE SERPL-SCNC: 107 MMOL/L (ref 101–111)
CO2 SERPL-SCNC: 28 MMOL/L (ref 22–32)
CREAT BLD-MCNC: 0.68 MG/DL (ref 0.55–1.02)
EOSINOPHIL # BLD AUTO: 0.17 X10(3) UL (ref 0–0.3)
EOSINOPHIL NFR BLD AUTO: 1.8 %
ERYTHROCYTE [DISTWIDTH] IN BLOOD BY AUTOMATED COUNT: 15.3 % (ref 11.5–16)
GLOBULIN PLAS-MCNC: 3.7 G/DL (ref 2.5–3.7)
GLUCOSE BLD-MCNC: 76 MG/DL (ref 70–99)
HAV AB SERPL IA-ACNC: 491 PG/ML (ref 193–986)
HCT VFR BLD AUTO: 40.6 % (ref 34–50)
HGB BLD-MCNC: 12.5 G/DL (ref 12–16)
IMMATURE GRANULOCYTE COUNT: 0.03 X10(3) UL (ref 0–1)
IMMATURE GRANULOCYTE RATIO %: 0.3 %
LYMPHOCYTES # BLD AUTO: 2.26 X10(3) UL (ref 0.9–4)
LYMPHOCYTES NFR BLD AUTO: 23.9 %
M PROTEIN MFR SERPL ELPH: 6.8 G/DL (ref 6.1–8.3)
MCH RBC QN AUTO: 29.8 PG (ref 27–33.2)
MCHC RBC AUTO-ENTMCNC: 30.8 G/DL (ref 31–37)
MCV RBC AUTO: 96.9 FL (ref 81–100)
MONOCYTES # BLD AUTO: 1.08 X10(3) UL (ref 0.1–1)
MONOCYTES NFR BLD AUTO: 11.4 %
NEUTROPHIL ABS PRELIM: 5.86 X10 (3) UL (ref 1.3–6.7)
NEUTROPHILS # BLD AUTO: 5.86 X10(3) UL (ref 1.3–6.7)
NEUTROPHILS NFR BLD AUTO: 62.2 %
OSMOLALITY SERPL CALC.SUM OF ELEC: 289 MOSM/KG (ref 275–295)
PLATELET # BLD AUTO: 379 10(3)UL (ref 150–450)
POTASSIUM SERPL-SCNC: 4 MMOL/L (ref 3.6–5.1)
RBC # BLD AUTO: 4.19 X10(6)UL (ref 3.8–5.1)
RED CELL DISTRIBUTION WIDTH-SD: 54 FL (ref 35.1–46.3)
SODIUM SERPL-SCNC: 141 MMOL/L (ref 136–144)
VIT D+METAB SERPL-MCNC: 56.3 NG/ML (ref 30–100)
WBC # BLD AUTO: 9.4 X10(3) UL (ref 4–13)

## 2018-08-13 PROCEDURE — 82306 VITAMIN D 25 HYDROXY: CPT | Performed by: FAMILY MEDICINE

## 2018-08-13 PROCEDURE — 82607 VITAMIN B-12: CPT | Performed by: FAMILY MEDICINE

## 2018-09-24 ENCOUNTER — HOSPITAL ENCOUNTER (OUTPATIENT)
Dept: CT IMAGING | Age: 43
Discharge: HOME | End: 2018-09-24
Attending: INTERNAL MEDICINE
Payer: COMMERCIAL

## 2018-09-24 DIAGNOSIS — J84.9 ILD (INTERSTITIAL LUNG DISEASE) (HCC): ICD-10-CM

## 2018-09-24 PROCEDURE — 71250 CT THORAX DX C-: CPT | Performed by: INTERNAL MEDICINE

## 2018-11-17 ENCOUNTER — HOSPITAL ENCOUNTER (OUTPATIENT)
Age: 43
Discharge: HOME | End: 2018-11-17
Attending: FAMILY MEDICINE
Payer: COMMERCIAL

## 2018-11-17 VITALS
BODY MASS INDEX: 31.1 KG/M2 | HEIGHT: 69 IN | RESPIRATION RATE: 18 BRPM | OXYGEN SATURATION: 98 % | TEMPERATURE: 100 F | SYSTOLIC BLOOD PRESSURE: 116 MMHG | WEIGHT: 210 LBS | DIASTOLIC BLOOD PRESSURE: 74 MMHG | HEART RATE: 79 BPM

## 2018-11-17 DIAGNOSIS — H60.501 ACUTE OTITIS EXTERNA OF RIGHT EAR, UNSPECIFIED TYPE: Primary | ICD-10-CM

## 2018-11-17 DIAGNOSIS — H69.83 EUSTACHIAN TUBE DYSFUNCTION, BILATERAL: ICD-10-CM

## 2018-11-17 PROCEDURE — 99213 OFFICE O/P EST LOW 20 MIN: CPT

## 2018-11-17 PROCEDURE — 99214 OFFICE O/P EST MOD 30 MIN: CPT

## 2018-11-17 RX ORDER — CIPROFLOXACIN AND DEXAMETHASONE 3; 1 MG/ML; MG/ML
4 SUSPENSION/ DROPS AURICULAR (OTIC) 2 TIMES DAILY
Qty: 1 BOTTLE | Refills: 0 | Status: SHIPPED | OUTPATIENT
Start: 2018-11-17 | End: 2018-11-24

## 2018-11-18 NOTE — ED PROVIDER NOTES
Patient Seen in: [address] [facility]    History   Patient presents with:  Ear Pain    Stated Complaint: CLOGGED EARS , EARS ARE SWOLLEN    HPI    19-year-old female presents for bilateral ear pain.   States she has dryness and itchin HPI.  Constitutional and vital signs reviewed. All other systems reviewed and negative except as noted above.     Physical Exam     ED Triage Vitals   BP 11/17/18 0924 116/74   Pulse 11/17/18 0924 79   Resp 11/17/18 0924 18   Temp 11/17/18 0929 100.4 ° bilateral    Disposition:  Discharge  11/17/2018  9:33 am    Follow-up:  Miguel Clemons MD  [address] [place] [address] [phone]    In 3 days  If symptoms worsen        Medications Prescribed:  Discharge Medication List as of 11/1

## 2018-11-29 ENCOUNTER — HOSPITAL ENCOUNTER (OUTPATIENT)
Age: 43
Discharge: HOME | End: 2018-11-29
Attending: FAMILY MEDICINE
Payer: COMMERCIAL

## 2018-11-29 VITALS
TEMPERATURE: 98 F | OXYGEN SATURATION: 99 % | HEART RATE: 110 BPM | RESPIRATION RATE: 16 BRPM | DIASTOLIC BLOOD PRESSURE: 94 MMHG | SYSTOLIC BLOOD PRESSURE: 133 MMHG

## 2018-11-29 DIAGNOSIS — B96.89 BACTERIAL VAGINITIS: Primary | ICD-10-CM

## 2018-11-29 DIAGNOSIS — N30.01 ACUTE CYSTITIS WITH HEMATURIA: ICD-10-CM

## 2018-11-29 DIAGNOSIS — N76.0 BACTERIAL VAGINITIS: Primary | ICD-10-CM

## 2018-11-29 PROCEDURE — 81025 URINE PREGNANCY TEST: CPT | Performed by: FAMILY MEDICINE

## 2018-11-29 PROCEDURE — 87077 CULTURE AEROBIC IDENTIFY: CPT | Performed by: FAMILY MEDICINE

## 2018-11-29 PROCEDURE — 81002 URINALYSIS NONAUTO W/O SCOPE: CPT | Performed by: FAMILY MEDICINE

## 2018-11-29 PROCEDURE — 87510 GARDNER VAG DNA DIR PROBE: CPT | Performed by: FAMILY MEDICINE

## 2018-11-29 PROCEDURE — 99214 OFFICE O/P EST MOD 30 MIN: CPT

## 2018-11-29 PROCEDURE — 87491 CHLMYD TRACH DNA AMP PROBE: CPT | Performed by: FAMILY MEDICINE

## 2018-11-29 PROCEDURE — 87591 N.GONORRHOEAE DNA AMP PROB: CPT | Performed by: FAMILY MEDICINE

## 2018-11-29 PROCEDURE — 87660 TRICHOMONAS VAGIN DIR PROBE: CPT | Performed by: FAMILY MEDICINE

## 2018-11-29 PROCEDURE — 87480 CANDIDA DNA DIR PROBE: CPT | Performed by: FAMILY MEDICINE

## 2018-11-29 PROCEDURE — 87086 URINE CULTURE/COLONY COUNT: CPT | Performed by: FAMILY MEDICINE

## 2018-11-29 RX ORDER — PHENAZOPYRIDINE HYDROCHLORIDE 200 MG/1
200 TABLET, FILM COATED ORAL 3 TIMES DAILY PRN
Qty: 6 TABLET | Refills: 0 | Status: SHIPPED | OUTPATIENT
Start: 2018-11-29 | End: 2018-12-02

## 2018-11-29 RX ORDER — METRONIDAZOLE 500 MG/1
500 TABLET ORAL 2 TIMES DAILY
Qty: 14 TABLET | Refills: 0 | Status: SHIPPED | OUTPATIENT
Start: 2018-11-29 | End: 2018-12-06

## 2018-11-29 RX ORDER — NITROFURANTOIN 25; 75 MG/1; MG/1
100 CAPSULE ORAL 2 TIMES DAILY
Qty: 10 CAPSULE | Refills: 0 | Status: SHIPPED | OUTPATIENT
Start: 2018-11-29 | End: 2018-12-03

## 2018-11-30 RX ORDER — AMOXICILLIN 875 MG/1
875 TABLET, COATED ORAL 2 TIMES DAILY
Qty: 14 TABLET | Refills: 0 | Status: SHIPPED | OUTPATIENT
Start: 2018-11-30 | End: 2018-12-07

## 2018-11-30 NOTE — ED PROVIDER NOTES
Patient Seen in: [facility]    History   Patient presents with:  Urinary Symptoms (urologic)    Stated Complaint: possible uti    HPI    63-year-old female with history of celiac disease, Wegener's vasculitis, and status po reviewed and is not pertinent to presenting problem.     Social History    Tobacco Use      Smoking status: Never Smoker      Smokeless tobacco: Never Used    Alcohol use: No    Drug use: No      Review of Systems    Positive for stated complaint: possible leukocytes and moderate blood. Her vaginal exam is consistent with BV. Will start patient on Macrobid for possible UTI–urine culture sent. We will also start metronidazole for bacterial vaginosis. GC, chlamydia, and vaginitis panel also sent.

## 2018-12-03 ENCOUNTER — HOSPITAL ENCOUNTER (OUTPATIENT)
Age: 43
Discharge: HOME | End: 2018-12-03
Attending: FAMILY MEDICINE
Payer: COMMERCIAL

## 2018-12-03 VITALS
DIASTOLIC BLOOD PRESSURE: 72 MMHG | BODY MASS INDEX: 31.1 KG/M2 | HEART RATE: 93 BPM | OXYGEN SATURATION: 96 % | SYSTOLIC BLOOD PRESSURE: 108 MMHG | WEIGHT: 210 LBS | HEIGHT: 69 IN | RESPIRATION RATE: 18 BRPM | TEMPERATURE: 98 F

## 2018-12-03 DIAGNOSIS — A60.00 GENITAL HERPES SIMPLEX, UNSPECIFIED SITE: Primary | ICD-10-CM

## 2018-12-03 PROCEDURE — 99214 OFFICE O/P EST MOD 30 MIN: CPT

## 2018-12-03 PROCEDURE — 87529 HSV DNA AMP PROBE: CPT | Performed by: FAMILY MEDICINE

## 2018-12-03 RX ORDER — VALACYCLOVIR HYDROCHLORIDE 1 G/1
1 TABLET, FILM COATED ORAL 3 TIMES DAILY
Qty: 21 TABLET | Refills: 0 | Status: SHIPPED | OUTPATIENT
Start: 2018-12-03 | End: 2018-12-10

## 2018-12-03 NOTE — ED INITIAL ASSESSMENT (HPI)
Was here on 11/29/18. Back here today with sore around the vaginal area, noticed on Saturday. Currently taking Amoxicillin and Flagyl. Concerned about HSV.

## 2018-12-03 NOTE — ED NOTES
Patient called and stated that she noticed \"canker sore\" like sores around her vaginal area. Patient is concerned that it may be herpes. Patient told that she would need to be reevaluated by a physician.   HSV cultures may or may not be obtained to conf

## 2018-12-05 NOTE — ED PROVIDER NOTES
Patient Seen in: [facility]    History   Patient presents with:  Marybeth-KRISTIN (gynecologic)    Stated Complaint: follow up    HPI    55-year-old female presents for painful sores in the genital area.   Patient states she was seen Smoking status: Never Smoker      Smokeless tobacco: Never Used    Alcohol use: No    Drug use: No      Review of Systems    Positive for stated complaint: follow up  Other systems are as noted in HPI. Constitutional and vital signs reviewed.       All ot am    Follow-up:  Kel Miramontes MD  [address] [address] [phone]    In 3 days  If symptoms worsen        Medications Prescribed:  Discharge Medication List as of 12/3/2018  9:52 AM    START taking these medications    Daphney

## 2018-12-26 PROBLEM — A60.00 GENITAL HERPES SIMPLEX TYPE 1 INFECTION: Status: ACTIVE | Noted: 2018-12-26

## 2018-12-27 ENCOUNTER — HOSPITAL ENCOUNTER (OUTPATIENT)
Dept: MAMMOGRAPHY | Age: 43
Discharge: HOME | End: 2018-12-27
Attending: FAMILY MEDICINE
Payer: COMMERCIAL

## 2018-12-27 DIAGNOSIS — Z12.31 ENCOUNTER FOR SCREENING MAMMOGRAM FOR MALIGNANT NEOPLASM OF BREAST: ICD-10-CM

## 2018-12-27 PROCEDURE — 77063 BREAST TOMOSYNTHESIS BI: CPT | Performed by: FAMILY MEDICINE

## 2018-12-27 PROCEDURE — 77067 SCR MAMMO BI INCL CAD: CPT | Performed by: FAMILY MEDICINE

## 2018-12-28 ENCOUNTER — TELEPHONE (OUTPATIENT)
Dept: FAMILY MEDICINE CLINIC | Facility: CLINIC | Age: 43
End: 2018-12-28

## 2018-12-28 NOTE — TELEPHONE ENCOUNTER
Please call the patient and tell her that we certainly hope that all her follow-up films come back negative as the previous ones have but there is no guarantee that that will be the case. The additional views are what is medically advised.   To decline parminder

## 2018-12-28 NOTE — TELEPHONE ENCOUNTER
FYI:  Pt is refusing to do additional mammogram views ordered b/c she cannot get in before the new year; pt has met her deductible for this year and does not want to have to pay.     Pt told Lara at mammography that she has had to have additional views in

## 2018-12-31 NOTE — TELEPHONE ENCOUNTER
Call to pt's cell reaches identified voice mail. Left vmm req call back to triage nurse wed, 1/2 for additional instructions from dr Glory Nieves. Provided ofc phone hours.

## 2019-01-04 NOTE — TELEPHONE ENCOUNTER
Patient has been given the message  asked that we relay and patient verbalizes understanding and will consider scheduling.

## 2019-01-08 RX ORDER — OXYBUTYNIN CHLORIDE 5 MG/1
TABLET, EXTENDED RELEASE ORAL
Qty: 90 TABLET | Refills: 0 | Status: SHIPPED | OUTPATIENT
Start: 2019-01-08 | End: 2019-05-30

## 2019-01-18 ENCOUNTER — TELEPHONE (OUTPATIENT)
Dept: UROLOGY | Facility: HOSPITAL | Age: 44
End: 2019-01-18

## 2019-03-07 ENCOUNTER — APPOINTMENT (OUTPATIENT)
Dept: GENERAL RADIOLOGY | Age: 44
End: 2019-03-07
Attending: FAMILY MEDICINE
Payer: COMMERCIAL

## 2019-03-07 ENCOUNTER — HOSPITAL ENCOUNTER (OUTPATIENT)
Age: 44
Discharge: HOME | End: 2019-03-07
Attending: FAMILY MEDICINE
Payer: COMMERCIAL

## 2019-03-07 VITALS
TEMPERATURE: 98 F | HEIGHT: 69 IN | HEART RATE: 86 BPM | BODY MASS INDEX: 33.33 KG/M2 | DIASTOLIC BLOOD PRESSURE: 78 MMHG | WEIGHT: 225 LBS | RESPIRATION RATE: 18 BRPM | OXYGEN SATURATION: 98 % | SYSTOLIC BLOOD PRESSURE: 134 MMHG

## 2019-03-07 DIAGNOSIS — S16.1XXA ACUTE CERVICAL MYOFASCIAL STRAIN, INITIAL ENCOUNTER: Primary | ICD-10-CM

## 2019-03-07 PROCEDURE — 72050 X-RAY EXAM NECK SPINE 4/5VWS: CPT | Performed by: FAMILY MEDICINE

## 2019-03-07 PROCEDURE — 99214 OFFICE O/P EST MOD 30 MIN: CPT

## 2019-03-07 PROCEDURE — 99213 OFFICE O/P EST LOW 20 MIN: CPT

## 2019-03-07 RX ORDER — IBUPROFEN 600 MG/1
600 TABLET ORAL EVERY 8 HOURS PRN
Qty: 21 TABLET | Refills: 0 | Status: SHIPPED | OUTPATIENT
Start: 2019-03-07 | End: 2019-03-14

## 2019-03-07 RX ORDER — HYDROCODONE BITARTRATE AND ACETAMINOPHEN 5; 325 MG/1; MG/1
1 TABLET ORAL EVERY 8 HOURS PRN
Qty: 10 TABLET | Refills: 0 | Status: SHIPPED | OUTPATIENT
Start: 2019-03-07 | End: 2019-03-10

## 2019-03-07 RX ORDER — IBUPROFEN 600 MG/1
600 TABLET ORAL EVERY 6 HOURS PRN
COMMUNITY
End: 2021-08-09 | Stop reason: ALTCHOICE

## 2019-03-07 RX ORDER — CYCLOBENZAPRINE HCL 10 MG
10 TABLET ORAL NIGHTLY
Qty: 10 TABLET | Refills: 0 | Status: SHIPPED | OUTPATIENT
Start: 2019-03-07 | End: 2019-03-17

## 2019-03-07 NOTE — ED PROVIDER NOTES
Patient Seen in: [facility]    History   Patient presents with:  Neck Pain (musculoskeletal, neurologic)    Stated Complaint: neck pain x 4 days    HPI  26-year-old female presents to immediate care with a 3-day history of Mesh   • OTHER SURGICAL HISTORY Right 3/2015    Retina tear   • TONSILLECTOMY Bilateral 2/13/2018    Performed by Veronica Sanches MD at [facility] MAIN OR   • UPPER GI ENDOSCOPY,EXAM      biopsies        Family history reviewed and is not pertinent to presenting p rate, regular rhythm, normal heart sounds and intact distal pulses. Pulmonary/Chest: Effort normal and breath sounds normal. No respiratory distress. Abdominal: Soft. Bowel sounds are normal. There is no tenderness.    Lymphadenopathy:     She has no ce patient  Advised to go easy on her exercises  Close follow-up with the PCP encouraged  Patient verbalized discharge instructions  Disposition:  Discharge  3/7/2019  6:00 pm    Follow-up:  [facility] [address] [facility] [address]

## 2019-03-07 NOTE — ED INITIAL ASSESSMENT (HPI)
Pt having neck pain since exercising class on Monday night. States she was doing neck exercises last night which now having more pain to neck.

## 2019-03-08 ENCOUNTER — TELEPHONE (OUTPATIENT)
Dept: FAMILY MEDICINE CLINIC | Facility: CLINIC | Age: 44
End: 2019-03-08

## 2019-03-08 NOTE — TELEPHONE ENCOUNTER
Pt has not been seen in over a year. She would need to have this addressed at an appt. Please make her an appt for urgent care follow up. Urgent care notes did advise her to make a follow up appt in 2 days. She is seeing dressler 4/13 for px but she may want this addressed sooner. Please have her make appt thanks.

## 2019-03-08 NOTE — TELEPHONE ENCOUNTER
Pt was in urgent care yesterday they advised her to call PCP pt is in need of CT scan and recommendation to a spine doc as pt had neck pain, 4 cm area that seems to be causing her this pain. And layers of arthritis that appear in the xray.  Please advised [phone]

## 2019-03-09 ENCOUNTER — HOSPITAL ENCOUNTER (EMERGENCY)
Facility: HOSPITAL | Age: 44
Discharge: HOME | End: 2019-03-09
Attending: EMERGENCY MEDICINE
Payer: COMMERCIAL

## 2019-03-09 ENCOUNTER — APPOINTMENT (OUTPATIENT)
Dept: CT IMAGING | Facility: HOSPITAL | Age: 44
End: 2019-03-09
Attending: EMERGENCY MEDICINE
Payer: COMMERCIAL

## 2019-03-09 VITALS
SYSTOLIC BLOOD PRESSURE: 125 MMHG | OXYGEN SATURATION: 97 % | HEART RATE: 81 BPM | BODY MASS INDEX: 33.33 KG/M2 | DIASTOLIC BLOOD PRESSURE: 84 MMHG | HEIGHT: 69 IN | RESPIRATION RATE: 20 BRPM | TEMPERATURE: 99 F | WEIGHT: 225 LBS

## 2019-03-09 DIAGNOSIS — M54.2 NECK PAIN: Primary | ICD-10-CM

## 2019-03-09 LAB
ANION GAP SERPL CALC-SCNC: 7 MMOL/L (ref 0–18)
BASOPHILS # BLD AUTO: 0.06 X10(3) UL (ref 0–0.2)
BASOPHILS NFR BLD AUTO: 0.7 %
BUN BLD-MCNC: 8 MG/DL (ref 7–18)
BUN/CREAT SERPL: 11.1 (ref 10–20)
CALCIUM BLD-MCNC: 8.6 MG/DL (ref 8.5–10.1)
CHLORIDE SERPL-SCNC: 111 MMOL/L (ref 98–107)
CO2 SERPL-SCNC: 23 MMOL/L (ref 21–32)
CREAT BLD-MCNC: 0.72 MG/DL (ref 0.55–1.02)
D-DIMER: <0.27 UG/ML FEU (ref 0–0.49)
DEPRECATED RDW RBC AUTO: 44.7 FL (ref 35.1–46.3)
EOSINOPHIL # BLD AUTO: 0.21 X10(3) UL (ref 0–0.7)
EOSINOPHIL NFR BLD AUTO: 2.4 %
ERYTHROCYTE [DISTWIDTH] IN BLOOD BY AUTOMATED COUNT: 13.2 % (ref 11–15)
GLUCOSE BLD-MCNC: 85 MG/DL (ref 70–99)
HCT VFR BLD AUTO: 39.3 % (ref 35–48)
HGB BLD-MCNC: 13 G/DL (ref 12–16)
IMM GRANULOCYTES # BLD AUTO: 0.03 X10(3) UL (ref 0–1)
IMM GRANULOCYTES NFR BLD: 0.3 %
LYMPHOCYTES # BLD AUTO: 2.28 X10(3) UL (ref 1–4)
LYMPHOCYTES NFR BLD AUTO: 26 %
MCH RBC QN AUTO: 30.7 PG (ref 26–34)
MCHC RBC AUTO-ENTMCNC: 33.1 G/DL (ref 31–37)
MCV RBC AUTO: 92.9 FL (ref 80–100)
MONOCYTES # BLD AUTO: 0.94 X10(3) UL (ref 0.1–1)
MONOCYTES NFR BLD AUTO: 10.7 %
NEUTROPHILS # BLD AUTO: 5.26 X10 (3) UL (ref 1.5–7.7)
NEUTROPHILS # BLD AUTO: 5.26 X10(3) UL (ref 1.5–7.7)
NEUTROPHILS NFR BLD AUTO: 59.9 %
OSMOLALITY SERPL CALC.SUM OF ELEC: 290 MOSM/KG (ref 275–295)
PLATELET # BLD AUTO: 356 10(3)UL (ref 150–450)
POTASSIUM SERPL-SCNC: 4.1 MMOL/L (ref 3.5–5.1)
RBC # BLD AUTO: 4.23 X10(6)UL (ref 3.8–5.3)
SODIUM SERPL-SCNC: 141 MMOL/L (ref 136–145)
WBC # BLD AUTO: 8.8 X10(3) UL (ref 4–11)

## 2019-03-09 PROCEDURE — 99284 EMERGENCY DEPT VISIT MOD MDM: CPT

## 2019-03-09 PROCEDURE — 70498 CT ANGIOGRAPHY NECK: CPT | Performed by: EMERGENCY MEDICINE

## 2019-03-09 PROCEDURE — 96374 THER/PROPH/DIAG INJ IV PUSH: CPT

## 2019-03-09 PROCEDURE — 85378 FIBRIN DEGRADE SEMIQUANT: CPT | Performed by: EMERGENCY MEDICINE

## 2019-03-09 PROCEDURE — 80048 BASIC METABOLIC PNL TOTAL CA: CPT | Performed by: EMERGENCY MEDICINE

## 2019-03-09 PROCEDURE — 85025 COMPLETE CBC W/AUTO DIFF WBC: CPT | Performed by: EMERGENCY MEDICINE

## 2019-03-09 RX ORDER — CYCLOBENZAPRINE HCL 10 MG
10 TABLET ORAL 3 TIMES DAILY PRN
Qty: 20 TABLET | Refills: 0 | Status: SHIPPED | OUTPATIENT
Start: 2019-03-09 | End: 2019-05-24 | Stop reason: ALTCHOICE

## 2019-03-09 RX ORDER — PREDNISONE 20 MG/1
60 TABLET ORAL DAILY
Qty: 15 TABLET | Refills: 0 | Status: SHIPPED | OUTPATIENT
Start: 2019-03-09 | End: 2019-03-14

## 2019-03-09 RX ORDER — DEXAMETHASONE SODIUM PHOSPHATE 4 MG/ML
10 VIAL (ML) INJECTION ONCE
Status: COMPLETED | OUTPATIENT
Start: 2019-03-09 | End: 2019-03-09

## 2019-03-09 NOTE — ED PROVIDER NOTES
Patient Seen in: [facility] Emergency Department    History   Patient presents with:  Neck Pain (musculoskeletal, neurologic)    Stated Complaint: neck pain, denies injury, denies radiation of pain or N/T    HPI    This is a 77-year-old female who p above.    Physical Exam     ED Triage Vitals [03/09/19 1059]   /87   Pulse 90   Resp 18   Temp 99.2 °F (37.3 °C)   Temp src Temporal   SpO2 97 %   O2 Device None (Room air)       Current:/84   Pulse 81   Temp 99.2 °F (37.3 °C) (Temporal)   Resp the total clinical  evaluation of the patient. 1st Trimester:  0.05-0.95 ug/mL (FEU)     2nd Trimester:  0.32-1.29 ug/mL (FEU)    3rd Trimester:  0.13-1.70 ug/mL (FEU)    In pregnant females, refer to the chart above.   No studies have been performed  on STATED HISTORY: (As transcribed by Technologist)  No known injury. Mostly left sided neck pain that radiates into both shoulders for last 4 days. FINDINGS:    There is straightening of the expected lordosis.   There is a few mm anterolisthesis of C4 on C hemodynamically significant stenosis or dissection. CAROTID BULB:    No hemodynamically significant stenosis or dissection. INTERNAL CAROTID:    No hemodynamically significant stenosis or dissection.  EXTERNAL CAROTID:    No hemodynamically significant sten [id number] [phone]  Call  choose option 2 for neurosurgery or pain follow up        Medications Prescribed:  Discharge Medication List as of 3/9/2019  3:09 PM    START taking these medications    ! ! predniSONE 20 MG Oral Tab  Take 3 tablets (60 mg to

## 2019-03-09 NOTE — ED INITIAL ASSESSMENT (HPI)
PT STARTED ON Tuesday WITH STIFFNESS AND PAIN TO THE LEFT SIDE OF HER NECK RADIATING INTO THE LOWER HEAD. PAIN WORSENED SINCE THEN AFTER TRYING TO DO SOME ROM EXERCISES AND STRETCHING.  PT WENT TO WALK IN CLINIC AND WAS GIVEN FLEXERIL AND HYDROCODONE WITHOU

## 2019-03-11 ENCOUNTER — TELEPHONE (OUTPATIENT)
Dept: FAMILY MEDICINE CLINIC | Facility: CLINIC | Age: 44
End: 2019-03-11

## 2019-03-11 NOTE — TELEPHONE ENCOUNTER
HENRRY  Outgoing call to patient for a condition update, patient states she is feeling much better, she went to ER on Saturday, she was given steroids and a muscle relaxer. Declined to schedule appointment today, she has an appointment 4/13/19.   Advised to marta

## 2019-03-11 NOTE — TELEPHONE ENCOUNTER
Patient paged me on Saturday stating that she went to the urgent care for neck pain and was advised by the physician that if she is not better, to call her primary care physician for a CT scan.   She called me on Saturday because she states that her pain wa

## 2019-03-25 ENCOUNTER — OFFICE VISIT (OUTPATIENT)
Dept: SURGERY | Facility: CLINIC | Age: 44
End: 2019-03-25
Payer: COMMERCIAL

## 2019-03-25 VITALS
BODY MASS INDEX: 32.58 KG/M2 | DIASTOLIC BLOOD PRESSURE: 76 MMHG | HEIGHT: 69 IN | WEIGHT: 220 LBS | HEART RATE: 74 BPM | SYSTOLIC BLOOD PRESSURE: 126 MMHG

## 2019-03-25 DIAGNOSIS — M54.12 CERVICAL RADICULOPATHY: Primary | ICD-10-CM

## 2019-03-25 PROCEDURE — 99243 OFF/OP CNSLTJ NEW/EST LOW 30: CPT | Performed by: PHYSICIAN ASSISTANT

## 2019-03-25 RX ORDER — MELOXICAM 15 MG/1
15 TABLET ORAL DAILY
Qty: 30 TABLET | Refills: 2 | Status: SHIPPED | OUTPATIENT
Start: 2019-03-25 | End: 2021-10-01

## 2019-03-25 NOTE — PROGRESS NOTES
Location of Pain:  Pt states that she has cervical pain and bilateral shoulder pain. Pt states that she has no radiating pain. Pt states that she has no issues with numbness and tinging. Pt states that she has no issues with weakness.  Pt states that she h

## 2019-03-25 NOTE — H&P
Neurosurgery Clinic Visit  3/25/2019    Omega Paulino PCP:  Dilip Foster MD    1975 MRN [id number]       CHIEF COMPLAINT:  Patient presents with:  New Patient: Neck Pain       HISTORY OF PRESENT ILLNESS:  Omeag Paulino is a(n) 01 (three) times daily as needed for Muscle spasms. Disp: 20 tablet Rfl: 0   ibuprofen 600 MG Oral Tab Take 600 mg by mouth every 6 (six) hours as needed for Pain.  Disp:  Rfl:    OXYBUTYNIN CHLORIDE ER 5 MG Oral Tablet 24 Hr TAKE 1 TABLET DAILY Disp: 90 table Hypercholesterolemia   • Stroke Mother    • Heart Disease Mother    • Diabetes Brother    • High Cholesterol Father         Hypercholesterolemia   • Heart Disorder Father         Palpitations   • Diabetes Brother    • Cancer Neg      Golden Baumann  reports wes flexion EHL   Left 5/5 5/5 5/5 5/5 5/5 5/5   Right 5/5 5/5 5/5 5/5 5/5 5/5     DTRs:   Biceps Triceps Brachioradialis Patellar Achilles   Left 2+ 2+ 2+ 2+ 2+   Right 2+ 2+ 2+ 2+ 2+     Clonus:  Absent. Choe's:  Absent. Romberg:  Negative.   Pronator dr

## 2019-04-01 ENCOUNTER — TELEPHONE (OUTPATIENT)
Dept: SURGERY | Facility: CLINIC | Age: 44
End: 2019-04-01

## 2019-04-01 ENCOUNTER — TELEPHONE (OUTPATIENT)
Dept: NEUROLOGY | Facility: CLINIC | Age: 44
End: 2019-04-01

## 2019-04-05 NOTE — TELEPHONE ENCOUNTER
Message below noted. Order faxed. Will also forward message to PA team to make sure the location is updated.

## 2019-04-05 NOTE — TELEPHONE ENCOUNTER
Lawernce Serum from [facility] contacted office, states order was not received this morning, requesting order be re-faxed. Order faxed again to [phone], fax confirmation received.

## 2019-04-05 NOTE — TELEPHONE ENCOUNTER
Patient states she is having MRI completed at  [facility], requesting order to be faxed to [phone]

## 2019-04-10 RX ORDER — OXYBUTYNIN CHLORIDE 5 MG/1
TABLET, EXTENDED RELEASE ORAL
Qty: 30 TABLET | Refills: 0 | OUTPATIENT
Start: 2019-04-10

## 2019-04-13 ENCOUNTER — OFFICE VISIT (OUTPATIENT)
Dept: FAMILY MEDICINE CLINIC | Facility: CLINIC | Age: 44
End: 2019-04-13
Payer: COMMERCIAL

## 2019-04-13 VITALS
RESPIRATION RATE: 20 BRPM | DIASTOLIC BLOOD PRESSURE: 70 MMHG | SYSTOLIC BLOOD PRESSURE: 124 MMHG | HEIGHT: 68.5 IN | HEART RATE: 99 BPM | WEIGHT: 226 LBS | TEMPERATURE: 98 F | BODY MASS INDEX: 33.86 KG/M2

## 2019-04-13 DIAGNOSIS — E04.1 THYROID NODULE: ICD-10-CM

## 2019-04-13 DIAGNOSIS — Z13.89 SCREENING FOR GENITOURINARY CONDITION: ICD-10-CM

## 2019-04-13 DIAGNOSIS — K90.0 CELIAC DISEASE: ICD-10-CM

## 2019-04-13 DIAGNOSIS — Z00.00 ROUTINE GENERAL MEDICAL EXAMINATION AT A HEALTH CARE FACILITY: Primary | ICD-10-CM

## 2019-04-13 DIAGNOSIS — Z12.39 BREAST CANCER SCREENING: ICD-10-CM

## 2019-04-13 DIAGNOSIS — Z00.00 BLOOD TESTS FOR ROUTINE GENERAL PHYSICAL EXAMINATION: ICD-10-CM

## 2019-04-13 PROBLEM — J02.9 SORE THROAT: Status: RESOLVED | Noted: 2017-12-27 | Resolved: 2019-04-13

## 2019-04-13 PROCEDURE — 99396 PREV VISIT EST AGE 40-64: CPT | Performed by: FAMILY MEDICINE

## 2019-04-13 RX ORDER — FEXOFENADINE HCL AND PSEUDOEPHEDRINE HCI 180; 240 MG/1; MG/1
TABLET, EXTENDED RELEASE ORAL
Qty: 30 TABLET | Refills: 5 | Status: SHIPPED | OUTPATIENT
Start: 2019-04-13 | End: 2020-02-14

## 2019-04-13 NOTE — PROGRESS NOTES
CHIEF COMPLAINT   Complete physical  HPI:   Porsche Davis is a 37year old female who presents for a complete physical exam.   Dr. Jerardo Galvan - gyne and breast exam.   Recent CTA neck - thyroid nodule noted. Seeing neuro for neck pain. MRI pending. tablets by mouth daily. Disp:  Rfl:    Multiple Vitamins-Minerals (MULTIVITAMIN OR) Take by mouth daily. Disp:  Rfl:    Cholecalciferol (VITAMIN D3) 5000 UNITS Oral Tab Take by mouth daily.    Disp:  Rfl:    Levonorgest-Eth Estrad 91-Day (SEASONALE) 0.15- status: Never Smoker      Smokeless tobacco: Never Used    Alcohol use: No    Drug use: No       REVIEW OF SYSTEMS:   GENERAL: feels well otherwise  SKIN: no complaint of any unusual skin lesions  EYES: no complaint of blurred vision or double vision  HEEN refer to Dr. Arun Swift if nodule 1cm or greater. Check labs. Reminded to schedule additional mamm views.

## 2019-04-19 ENCOUNTER — HOSPITAL ENCOUNTER (OUTPATIENT)
Dept: MAMMOGRAPHY | Facility: HOSPITAL | Age: 44
Discharge: HOME | End: 2019-04-19
Attending: FAMILY MEDICINE
Payer: COMMERCIAL

## 2019-04-19 DIAGNOSIS — R92.2 INCONCLUSIVE MAMMOGRAM: ICD-10-CM

## 2019-04-19 PROCEDURE — 77061 BREAST TOMOSYNTHESIS UNI: CPT | Performed by: FAMILY MEDICINE

## 2019-04-19 PROCEDURE — 76642 ULTRASOUND BREAST LIMITED: CPT | Performed by: FAMILY MEDICINE

## 2019-04-19 PROCEDURE — 77065 DX MAMMO INCL CAD UNI: CPT | Performed by: FAMILY MEDICINE

## 2019-04-22 ENCOUNTER — TELEPHONE (OUTPATIENT)
Dept: SURGERY | Facility: CLINIC | Age: 44
End: 2019-04-22

## 2019-04-26 ENCOUNTER — TELEPHONE (OUTPATIENT)
Dept: SURGERY | Facility: CLINIC | Age: 44
End: 2019-04-26

## 2019-04-26 NOTE — TELEPHONE ENCOUNTER
Lest message on Pt detailed voicemail. Relayed to Pt on regards of imaging. MRI and Xray of the cervical.     Advised Pt to bring disk with to the next OV with ANGELITA Trujillo   5/24/19     Per  TE 4/1/19 Pt requesting imaging be done a [place].

## 2019-04-29 NOTE — TELEPHONE ENCOUNTER
Left a detailed message on patient's confidential voicemail (ok per HIPPA) relaying message below. When returns call please remind patient to bring Imaging discs - MRI and Xray Cervical to appt with Philomena GOLDSTEIN on 5/24/19.

## 2019-05-06 ENCOUNTER — TELEPHONE (OUTPATIENT)
Dept: SURGERY | Facility: CLINIC | Age: 44
End: 2019-05-06

## 2019-05-06 ENCOUNTER — TELEPHONE (OUTPATIENT)
Dept: FAMILY MEDICINE CLINIC | Facility: CLINIC | Age: 44
End: 2019-05-06

## 2019-05-06 PROCEDURE — 87624 HPV HI-RISK TYP POOLED RSLT: CPT | Performed by: OBSTETRICS & GYNECOLOGY

## 2019-05-06 PROCEDURE — 88175 CYTOPATH C/V AUTO FLUID REDO: CPT | Performed by: OBSTETRICS & GYNECOLOGY

## 2019-05-06 NOTE — TELEPHONE ENCOUNTER
Medical Clearance/H&P request.  Patient is scheduled to have right eye cataract surgery on 6/26/2019 with Dr. Kinsey Kilpatrick. Outgoing call to patient, left message to call back.

## 2019-05-24 ENCOUNTER — OFFICE VISIT (OUTPATIENT)
Dept: SURGERY | Facility: CLINIC | Age: 44
End: 2019-05-24
Payer: COMMERCIAL

## 2019-05-24 ENCOUNTER — TELEPHONE (OUTPATIENT)
Dept: SURGERY | Facility: CLINIC | Age: 44
End: 2019-05-24

## 2019-05-24 ENCOUNTER — TELEPHONE (OUTPATIENT)
Dept: FAMILY MEDICINE CLINIC | Facility: CLINIC | Age: 44
End: 2019-05-24

## 2019-05-24 VITALS — HEART RATE: 75 BPM | SYSTOLIC BLOOD PRESSURE: 118 MMHG | DIASTOLIC BLOOD PRESSURE: 72 MMHG

## 2019-05-24 DIAGNOSIS — M54.12 CERVICAL RADICULOPATHY: Primary | ICD-10-CM

## 2019-05-24 PROCEDURE — 99213 OFFICE O/P EST LOW 20 MIN: CPT | Performed by: PHYSICIAN ASSISTANT

## 2019-05-24 NOTE — PROGRESS NOTES
PT here for f/u. Post MRI and PT . Feels better. No new symptoms to relay. Patient completed 18 sessions of PT and feels great. Did not complete X-rays as instructed. Patient brought along her Physical Therapist for the appointment.

## 2019-05-24 NOTE — TELEPHONE ENCOUNTER
Pt came to the office and dropped a form given by her employer in regards to her physical that she completed in April. Pt states that she has blood work in the system and she plans on completing this weekend.     She wants to make sure that the blood wor

## 2019-05-24 NOTE — PROGRESS NOTES
Neurosurgery Clinic Visit  2019    Deloris Larkin PCP:  Alireza Escalante MD    1975 MRN [id number]     HISTORY OF PRESENT ILLNESS:  Deloris Larkin is a(n) 37year old female who is here for follow-up for neck pain.   She has been wo Js Eubanks, [address]   [phone]  5/24/2019  12:07 PM

## 2019-05-30 ENCOUNTER — TELEPHONE (OUTPATIENT)
Dept: UROLOGY | Facility: HOSPITAL | Age: 44
End: 2019-05-30

## 2019-05-30 RX ORDER — OXYBUTYNIN CHLORIDE 5 MG/1
5 TABLET, EXTENDED RELEASE ORAL
Qty: 60 TABLET | Refills: 0 | Status: SHIPPED | OUTPATIENT
Start: 2019-05-30 | End: 2019-07-02

## 2019-05-30 NOTE — TELEPHONE ENCOUNTER
Pt called to request refill of oxybutynin. Has upcoming appt with Dr. Snell Plan for med check July 2, 2019, needs refill to continue as ordered until then. Per last visit office notes, orders placed as noted, Oxybutynin ER 5mg PO QD, #60.   Called pt and [facility] for

## 2019-05-31 ENCOUNTER — LAB ENCOUNTER (OUTPATIENT)
Dept: LAB | Age: 44
End: 2019-05-31
Attending: FAMILY MEDICINE
Payer: COMMERCIAL

## 2019-05-31 DIAGNOSIS — Z00.00 BLOOD TESTS FOR ROUTINE GENERAL PHYSICAL EXAMINATION: ICD-10-CM

## 2019-05-31 DIAGNOSIS — E04.1 THYROID NODULE: ICD-10-CM

## 2019-05-31 DIAGNOSIS — Z13.89 SCREENING FOR GENITOURINARY CONDITION: ICD-10-CM

## 2019-05-31 DIAGNOSIS — K90.0 CELIAC DISEASE: ICD-10-CM

## 2019-05-31 PROCEDURE — 80061 LIPID PANEL: CPT | Performed by: FAMILY MEDICINE

## 2019-05-31 PROCEDURE — 84443 ASSAY THYROID STIM HORMONE: CPT | Performed by: FAMILY MEDICINE

## 2019-05-31 PROCEDURE — 84439 ASSAY OF FREE THYROXINE: CPT | Performed by: FAMILY MEDICINE

## 2019-05-31 PROCEDURE — 87077 CULTURE AEROBIC IDENTIFY: CPT | Performed by: FAMILY MEDICINE

## 2019-05-31 PROCEDURE — 82306 VITAMIN D 25 HYDROXY: CPT | Performed by: FAMILY MEDICINE

## 2019-05-31 PROCEDURE — 36415 COLL VENOUS BLD VENIPUNCTURE: CPT | Performed by: FAMILY MEDICINE

## 2019-05-31 PROCEDURE — 82607 VITAMIN B-12: CPT | Performed by: FAMILY MEDICINE

## 2019-05-31 PROCEDURE — 87086 URINE CULTURE/COLONY COUNT: CPT | Performed by: FAMILY MEDICINE

## 2019-05-31 PROCEDURE — 81001 URINALYSIS AUTO W/SCOPE: CPT | Performed by: FAMILY MEDICINE

## 2019-06-03 DIAGNOSIS — R82.90 ABNORMAL URINALYSIS: Primary | ICD-10-CM

## 2019-06-07 ENCOUNTER — LAB ENCOUNTER (OUTPATIENT)
Dept: LAB | Age: 44
End: 2019-06-07
Attending: FAMILY MEDICINE
Payer: COMMERCIAL

## 2019-06-07 ENCOUNTER — OFFICE VISIT (OUTPATIENT)
Dept: FAMILY MEDICINE CLINIC | Facility: CLINIC | Age: 44
End: 2019-06-07
Payer: COMMERCIAL

## 2019-06-07 VITALS
WEIGHT: 222 LBS | HEART RATE: 80 BPM | TEMPERATURE: 98 F | BODY MASS INDEX: 33.26 KG/M2 | SYSTOLIC BLOOD PRESSURE: 100 MMHG | HEIGHT: 68.5 IN | RESPIRATION RATE: 12 BRPM | DIASTOLIC BLOOD PRESSURE: 70 MMHG

## 2019-06-07 DIAGNOSIS — H26.9 CATARACT OF RIGHT EYE, UNSPECIFIED CATARACT TYPE: ICD-10-CM

## 2019-06-07 DIAGNOSIS — Z01.818 PREOPERATIVE CLEARANCE: Primary | ICD-10-CM

## 2019-06-07 DIAGNOSIS — R82.90 ABNORMAL URINALYSIS: ICD-10-CM

## 2019-06-07 PROBLEM — J04.30 SUPRAGLOTTITIS WITHOUT AIRWAY OBSTRUCTION: Status: RESOLVED | Noted: 2017-12-27 | Resolved: 2019-06-07

## 2019-06-07 PROCEDURE — 81001 URINALYSIS AUTO W/SCOPE: CPT | Performed by: FAMILY MEDICINE

## 2019-06-07 PROCEDURE — 87147 CULTURE TYPE IMMUNOLOGIC: CPT | Performed by: FAMILY MEDICINE

## 2019-06-07 PROCEDURE — 87086 URINE CULTURE/COLONY COUNT: CPT | Performed by: FAMILY MEDICINE

## 2019-06-07 PROCEDURE — 99242 OFF/OP CONSLTJ NEW/EST SF 20: CPT | Performed by: FAMILY MEDICINE

## 2019-06-07 NOTE — H&P
Lorraine Santiago is a 37year old female who presents for a pre-operative physical exam. Patient is to have right eye cataract done on 6/26/19 with Dr. Didier Guerra at [facility]. HPI:   Pt complains of decrease in vision right eye.   MAC ane exercise induced   • Celiac disease    • Extrinsic asthma, unspecified     exercise induced-no inhalers   • Gallstones    • H/O pleurisy 1/24/15    treated with course of steroids-followed by Dr Lynne Luz aware.    • Vasculitis ([facility] nodule  ALL/ASTHMA: hx of childhood asthma and bronchospasm with exertion.       EXAM:   /70   Pulse 80   Temp 98.2 °F (36.8 °C) (Oral)   Resp 12   Ht 68.5\"   Wt 222 lb   BMI 33.26 kg/m²   GENERAL: well developed, well nourished,in no apparent distre

## 2019-06-10 DIAGNOSIS — R82.71 GROUP B STREPTOCOCCAL BACTERIURIA: Primary | ICD-10-CM

## 2019-06-10 RX ORDER — CEPHALEXIN 500 MG/1
500 CAPSULE ORAL 2 TIMES DAILY
Qty: 10 CAPSULE | Refills: 0 | Status: SHIPPED | OUTPATIENT
Start: 2019-06-10 | End: 2019-11-08

## 2019-06-14 PROBLEM — E04.1 THYROID NODULE: Status: ACTIVE | Noted: 2019-06-14

## 2019-07-02 ENCOUNTER — OFFICE VISIT (OUTPATIENT)
Dept: UROLOGY | Facility: HOSPITAL | Age: 44
End: 2019-07-02
Attending: OBSTETRICS & GYNECOLOGY
Payer: COMMERCIAL

## 2019-07-02 VITALS
DIASTOLIC BLOOD PRESSURE: 62 MMHG | SYSTOLIC BLOOD PRESSURE: 124 MMHG | WEIGHT: 222 LBS | HEIGHT: 68.5 IN | BODY MASS INDEX: 33.26 KG/M2

## 2019-07-02 DIAGNOSIS — N81.84 PELVIC MUSCLE WASTING: ICD-10-CM

## 2019-07-02 DIAGNOSIS — N95.2 POSTMENOPAUSAL ATROPHIC VAGINITIS: Primary | ICD-10-CM

## 2019-07-02 DIAGNOSIS — N39.41 URGE INCONTINENCE: ICD-10-CM

## 2019-07-02 PROCEDURE — 99211 OFF/OP EST MAY X REQ PHY/QHP: CPT

## 2019-07-02 RX ORDER — ESTRADIOL 0.1 MG/G
CREAM VAGINAL
Qty: 1 TUBE | Refills: 3 | Status: SHIPPED | OUTPATIENT
Start: 2019-07-02 | End: 2019-11-08

## 2019-07-02 RX ORDER — OXYBUTYNIN CHLORIDE 5 MG/1
5 TABLET, EXTENDED RELEASE ORAL
Qty: 60 TABLET | Refills: 0 | Status: SHIPPED | OUTPATIENT
Start: 2019-07-02 | End: 2019-08-30

## 2019-07-02 RX ORDER — ESTRADIOL 0.1 MG/G
CREAM VAGINAL
Qty: 1 TUBE | Refills: 3 | Status: SHIPPED | OUTPATIENT
Start: 2019-07-02 | End: 2020-07-07

## 2019-07-02 NOTE — PROGRESS NOTES
Patient presents to follow up OAB    She is currently using oxybutynin ER 5mg daily    She reports +improvement and reports some dry mouth, not constipation (denies constipation)  Denies other significant side effects     HSV - manages w/ gyne  She reports

## 2019-07-05 PROCEDURE — 88173 CYTOPATH EVAL FNA REPORT: CPT | Performed by: OTOLARYNGOLOGY

## 2019-07-11 ENCOUNTER — LAB ENCOUNTER (OUTPATIENT)
Dept: LAB | Age: 44
End: 2019-07-11
Attending: INTERNAL MEDICINE
Payer: COMMERCIAL

## 2019-07-11 DIAGNOSIS — M31.30 WEGENER'S GRANULOMATOSIS: ICD-10-CM

## 2019-07-11 LAB
ALBUMIN SERPL-MCNC: 3.2 G/DL (ref 3.4–5)
ALBUMIN/GLOB SERPL: 0.8 {RATIO} (ref 1–2)
ALP LIVER SERPL-CCNC: 114 U/L (ref 37–98)
ALT SERPL-CCNC: 26 U/L (ref 13–56)
ANION GAP SERPL CALC-SCNC: 7 MMOL/L (ref 0–18)
AST SERPL-CCNC: 19 U/L (ref 15–37)
BASOPHILS # BLD AUTO: 0.06 X10(3) UL (ref 0–0.2)
BASOPHILS NFR BLD AUTO: 0.6 %
BILIRUB SERPL-MCNC: 0.4 MG/DL (ref 0.1–2)
BUN BLD-MCNC: 12 MG/DL (ref 7–18)
BUN/CREAT SERPL: 17.6 (ref 10–20)
CALCIUM BLD-MCNC: 8.3 MG/DL (ref 8.5–10.1)
CHLORIDE SERPL-SCNC: 108 MMOL/L (ref 98–112)
CO2 SERPL-SCNC: 24 MMOL/L (ref 21–32)
CREAT BLD-MCNC: 0.68 MG/DL (ref 0.55–1.02)
CRP SERPL-MCNC: 1.31 MG/DL (ref ?–0.3)
DEPRECATED RDW RBC AUTO: 45.7 FL (ref 35.1–46.3)
EOSINOPHIL # BLD AUTO: 0.35 X10(3) UL (ref 0–0.7)
EOSINOPHIL NFR BLD AUTO: 3.6 %
ERYTHROCYTE [DISTWIDTH] IN BLOOD BY AUTOMATED COUNT: 13.5 % (ref 11–15)
GLOBULIN PLAS-MCNC: 3.8 G/DL (ref 2.8–4.4)
GLUCOSE BLD-MCNC: 86 MG/DL (ref 70–99)
HCT VFR BLD AUTO: 40.5 % (ref 35–48)
HGB BLD-MCNC: 13 G/DL (ref 12–16)
IMM GRANULOCYTES # BLD AUTO: 0.05 X10(3) UL (ref 0–1)
IMM GRANULOCYTES NFR BLD: 0.5 %
LYMPHOCYTES # BLD AUTO: 2.54 X10(3) UL (ref 1–4)
LYMPHOCYTES NFR BLD AUTO: 26.2 %
M PROTEIN MFR SERPL ELPH: 7 G/DL (ref 6.4–8.2)
MCH RBC QN AUTO: 29.8 PG (ref 26–34)
MCHC RBC AUTO-ENTMCNC: 32.1 G/DL (ref 31–37)
MCV RBC AUTO: 92.9 FL (ref 80–100)
MONOCYTES # BLD AUTO: 0.96 X10(3) UL (ref 0.1–1)
MONOCYTES NFR BLD AUTO: 9.9 %
NEUTROPHILS # BLD AUTO: 5.74 X10 (3) UL (ref 1.5–7.7)
NEUTROPHILS # BLD AUTO: 5.74 X10(3) UL (ref 1.5–7.7)
NEUTROPHILS NFR BLD AUTO: 59.2 %
OSMOLALITY SERPL CALC.SUM OF ELEC: 287 MOSM/KG (ref 275–295)
PLATELET # BLD AUTO: 392 10(3)UL (ref 150–450)
POTASSIUM SERPL-SCNC: 3.9 MMOL/L (ref 3.5–5.1)
RBC # BLD AUTO: 4.36 X10(6)UL (ref 3.8–5.3)
SED RATE-ML: 34 MM/HR (ref 0–25)
SODIUM SERPL-SCNC: 139 MMOL/L (ref 136–145)
WBC # BLD AUTO: 9.7 X10(3) UL (ref 4–11)

## 2019-07-11 PROCEDURE — 36415 COLL VENOUS BLD VENIPUNCTURE: CPT

## 2019-07-11 PROCEDURE — 83876 ASSAY MYELOPEROXIDASE: CPT

## 2019-07-11 PROCEDURE — 85652 RBC SED RATE AUTOMATED: CPT

## 2019-07-11 PROCEDURE — 80053 COMPREHEN METABOLIC PANEL: CPT

## 2019-07-11 PROCEDURE — 85025 COMPLETE CBC W/AUTO DIFF WBC: CPT

## 2019-07-11 PROCEDURE — 86255 FLUORESCENT ANTIBODY SCREEN: CPT

## 2019-07-11 PROCEDURE — 86140 C-REACTIVE PROTEIN: CPT

## 2019-07-14 LAB
MYELOPEROX ANTIBODIES, IGG: 0 AU/ML
SERINE PROTEASE3, IGG: 0 AU/ML

## 2019-08-23 DIAGNOSIS — M54.12 CERVICAL RADICULOPATHY: ICD-10-CM

## 2019-08-23 NOTE — TELEPHONE ENCOUNTER
Per LOV Pt was instructed to follow up PRN. Pt can get this medication thru PCP. Pt is active on Mychart will send message in regards above. Medication denied.

## 2019-08-23 NOTE — TELEPHONE ENCOUNTER
Medication: Meloxicam     Date of last refill: ***    [place]    Last office visit: ***    Due back to clinic per last office note:  ***    Date next office visit scheduled:  ***            Last OV note recommendation: ***

## 2019-08-26 ENCOUNTER — TELEPHONE (OUTPATIENT)
Dept: SURGERY | Facility: CLINIC | Age: 44
End: 2019-08-26

## 2019-08-28 RX ORDER — MELOXICAM 15 MG/1
TABLET ORAL
Qty: 30 TABLET | Refills: 2 | OUTPATIENT
Start: 2019-08-28

## 2019-08-30 DIAGNOSIS — N39.41 URGE INCONTINENCE: ICD-10-CM

## 2019-09-03 RX ORDER — OXYBUTYNIN CHLORIDE 5 MG/1
TABLET, EXTENDED RELEASE ORAL
Qty: 60 TABLET | Refills: 3 | Status: SHIPPED | OUTPATIENT
Start: 2019-09-03 | End: 2020-03-03

## 2019-10-19 ENCOUNTER — HOSPITAL ENCOUNTER (OUTPATIENT)
Dept: CT IMAGING | Facility: HOSPITAL | Age: 44
Discharge: HOME | End: 2019-10-19
Attending: INTERNAL MEDICINE
Payer: COMMERCIAL

## 2019-10-19 DIAGNOSIS — R91.8 PULMONARY NODULES/LESIONS, MULTIPLE: ICD-10-CM

## 2019-10-19 PROCEDURE — 71250 CT THORAX DX C-: CPT | Performed by: INTERNAL MEDICINE

## 2019-11-01 ENCOUNTER — MED REC SCAN ONLY (OUTPATIENT)
Dept: FAMILY MEDICINE CLINIC | Facility: CLINIC | Age: 44
End: 2019-11-01

## 2019-11-27 ENCOUNTER — PATIENT MESSAGE (OUTPATIENT)
Dept: FAMILY MEDICINE CLINIC | Facility: CLINIC | Age: 44
End: 2019-11-27

## 2019-11-29 NOTE — TELEPHONE ENCOUNTER
There isn't a test to check for gluten in her system. Which test is she referring to? If she is referring to a celiac panel, I'm not sure if insurance will cover since she already has a diagnosis of celiac disease.   This is usually done to screen for jasiel

## 2019-11-29 NOTE — TELEPHONE ENCOUNTER
From: Em Chopra  To: Fatmata Escobedo PA-C  Sent: 11/27/2019 12:25 PM CST  Subject: Non-Urgent Medical Question    Marissa Tate,    I need to get my blood drawn again soon for Dr Tay Farrell and I am wondering if you can also order the test to [facility]

## 2019-11-29 NOTE — TELEPHONE ENCOUNTER
Linda Reyes I wasn't sure what gluten test to pend for you in case you wanted to approve it. Please advise.   Thanks

## 2019-12-06 ENCOUNTER — HOSPITAL ENCOUNTER (OUTPATIENT)
Age: 44
Discharge: HOME | End: 2019-12-06
Payer: COMMERCIAL

## 2019-12-06 ENCOUNTER — LAB ENCOUNTER (OUTPATIENT)
Dept: LAB | Age: 44
End: 2019-12-06
Attending: INTERNAL MEDICINE
Payer: COMMERCIAL

## 2019-12-06 DIAGNOSIS — M31.30 NECROTIZING GRANULOMATOUS INFLAMMATION OF LUNG (HCC): ICD-10-CM

## 2019-12-06 DIAGNOSIS — K90.0 CELIAC DISEASE: ICD-10-CM

## 2019-12-06 PROCEDURE — 90686 IIV4 VACC NO PRSV 0.5 ML IM: CPT

## 2019-12-06 PROCEDURE — 36415 COLL VENOUS BLD VENIPUNCTURE: CPT | Performed by: FAMILY MEDICINE

## 2019-12-06 PROCEDURE — 83516 IMMUNOASSAY NONANTIBODY: CPT | Performed by: FAMILY MEDICINE

## 2019-12-06 PROCEDURE — 90471 IMMUNIZATION ADMIN: CPT

## 2019-12-06 PROCEDURE — 86256 FLUORESCENT ANTIBODY TITER: CPT | Performed by: FAMILY MEDICINE

## 2019-12-06 PROCEDURE — 82784 ASSAY IGA/IGD/IGG/IGM EACH: CPT | Performed by: FAMILY MEDICINE

## 2019-12-06 NOTE — ED NOTES
Pt. Here for flu vaccine only. Administered and VIS sheet given. Pt. Left in stable condition. No complaints.

## 2019-12-10 ENCOUNTER — TELEPHONE (OUTPATIENT)
Dept: FAMILY MEDICINE CLINIC | Facility: CLINIC | Age: 44
End: 2019-12-10

## 2019-12-10 NOTE — TELEPHONE ENCOUNTER
Medical Records Request received from Chaya Doll for records from last OV notes and labs printed and faxed to [phone] on 12/10/19. Release sent to be scanned to patients chart.

## 2019-12-19 ENCOUNTER — HOSPITAL ENCOUNTER (OUTPATIENT)
Dept: MAMMOGRAPHY | Facility: HOSPITAL | Age: 44
Discharge: HOME | End: 2019-12-19
Attending: FAMILY MEDICINE
Payer: COMMERCIAL

## 2019-12-19 DIAGNOSIS — Z12.39 BREAST CANCER SCREENING: ICD-10-CM

## 2019-12-19 PROCEDURE — 77067 SCR MAMMO BI INCL CAD: CPT | Performed by: FAMILY MEDICINE

## 2019-12-19 PROCEDURE — 77063 BREAST TOMOSYNTHESIS BI: CPT | Performed by: FAMILY MEDICINE

## 2019-12-26 ENCOUNTER — OFFICE VISIT (OUTPATIENT)
Dept: HEMATOLOGY/ONCOLOGY | Age: 44
End: 2019-12-26
Attending: INTERNAL MEDICINE
Payer: COMMERCIAL

## 2019-12-26 VITALS
OXYGEN SATURATION: 98 % | TEMPERATURE: 99 F | WEIGHT: 231.38 LBS | BODY MASS INDEX: 35 KG/M2 | RESPIRATION RATE: 18 BRPM | HEART RATE: 99 BPM | SYSTOLIC BLOOD PRESSURE: 128 MMHG | DIASTOLIC BLOOD PRESSURE: 84 MMHG

## 2019-12-26 DIAGNOSIS — D47.2 IGA MONOCLONAL GAMMOPATHY: Primary | ICD-10-CM

## 2019-12-26 PROCEDURE — 99245 OFF/OP CONSLTJ NEW/EST HI 55: CPT | Performed by: INTERNAL MEDICINE

## 2019-12-27 NOTE — CONSULTS
Cancer Center Report of Consultation    Patient Name: Nehemias Pisano   YOB: 1975   Medical Record Number: [id number]   CSN: [id number]   Consulting Physician: Joellen Bunn M.D. Referring Physician: No ref.  provider found    Tutu Aliya Orellana MD at [facility] MAIN OR   • UPPER GI ENDOSCOPY,EXAM      biopsies        Gynecologic History:  OB History     T3    L3    SAB0  TAB0  Ectopic0  Multiple0  Live Births3       Comment: vdx3 (two girls, one boy)    Family History:  Family Forced sexual activity: Not on file    Other Topics      Concerns:         Service: Not Asked        Blood Transfusions: Not Asked        Caffeine Concern: Yes          1-2 daily         Occupational Exposure: Not Asked        Hobby Hazards: Not very tired from gluten in foods and then will             have stomach issues     Review of Systems:    Constitutional Normal - No fevers, chills, night sweats, excessive fatigue or weight loss. Eyes Normal - No significant visual difficulties.  No diplop Respiratory Normal - Lungs are clear to auscultation without rhonchi or wheezing. Cardiovascular Normal - Regular rate and rhythm of heart without murmurs, gallops or rubs.    Abdomen Normal - Non-tender, non-distended, no masses, ascites or hepatosplen Range: 6.4 - 8.2 g/dL 7.6   Albumin Latest Ref Range: 3.4 - 5.0 g/dL 3.1 (L)   C-REACTIVE PROTEIN Latest Ref Range: <0.30 mg/dL 2.76 (H)   Patient Fasting?  Unknown No   IMMUNOGLOBULIN A Latest Ref Range: 70.00 - 312.00 mg/dL 521.00 (H)   Anti-Neutrophil Cy Tissue Transglutaminase IgA Ab Latest Ref Range: <7.0 U/mL 1.0         Radiology:    Pathology:    Impression and Plan:  Elevated IgA: The patient carries a diagnosis of celiac disease, made by biopsy in 2012.  She eats a gluten free diet, but has had aranza

## 2020-01-02 LAB
ALBUMIN SERPL ELPH-MCNC: 3.74 G/DL (ref 3.75–5.21)
ALBUMIN/GLOB SERPL: 1.08 {RATIO} (ref 1–2)
ALPHA1 GLOB SERPL ELPH-MCNC: 0.39 G/DL (ref 0.19–0.46)
ALPHA2 GLOB SERPL ELPH-MCNC: 0.91 G/DL (ref 0.48–1.05)
B-GLOBULIN SERPL ELPH-MCNC: 1.12 G/DL (ref 0.68–1.23)
GAMMA GLOB SERPL ELPH-MCNC: 1.04 G/DL (ref 0.62–1.7)
M PROTEIN MFR SERPL ELPH: 7.2 G/DL (ref 6.4–8.2)

## 2020-01-17 ENCOUNTER — OFFICE VISIT (OUTPATIENT)
Dept: INTERNAL MEDICINE CLINIC | Facility: CLINIC | Age: 45
End: 2020-01-17
Payer: COMMERCIAL

## 2020-01-17 ENCOUNTER — LAB ENCOUNTER (OUTPATIENT)
Dept: LAB | Age: 45
End: 2020-01-17
Attending: NURSE PRACTITIONER
Payer: COMMERCIAL

## 2020-01-17 ENCOUNTER — EXTERNAL RECORD (OUTPATIENT)
Dept: HEALTH INFORMATION MANAGEMENT | Facility: OTHER | Age: 45
End: 2020-01-17

## 2020-01-17 VITALS
DIASTOLIC BLOOD PRESSURE: 72 MMHG | TEMPERATURE: 98 F | HEIGHT: 69 IN | BODY MASS INDEX: 33.6 KG/M2 | RESPIRATION RATE: 16 BRPM | SYSTOLIC BLOOD PRESSURE: 124 MMHG | WEIGHT: 226.88 LBS | HEART RATE: 90 BPM

## 2020-01-17 DIAGNOSIS — E66.9 CLASS 1 OBESITY WITHOUT SERIOUS COMORBIDITY WITH BODY MASS INDEX (BMI) OF 33.0 TO 33.9 IN ADULT, UNSPECIFIED OBESITY TYPE: ICD-10-CM

## 2020-01-17 DIAGNOSIS — Z86.69 HISTORY OF RETINAL DETACHMENT: ICD-10-CM

## 2020-01-17 DIAGNOSIS — Z51.81 THERAPEUTIC DRUG MONITORING: Primary | ICD-10-CM

## 2020-01-17 DIAGNOSIS — R91.8 PULMONARY NODULES: ICD-10-CM

## 2020-01-17 DIAGNOSIS — E55.9 VITAMIN D DEFICIENCY: ICD-10-CM

## 2020-01-17 DIAGNOSIS — E53.8 VITAMIN B12 DEFICIENCY: ICD-10-CM

## 2020-01-17 DIAGNOSIS — M31.30 GRANULOMATOSIS WITH POLYANGIITIS, UNSPECIFIED WHETHER RENAL INVOLVEMENT (HCC): ICD-10-CM

## 2020-01-17 DIAGNOSIS — R94.31 ABNORMAL EKG: ICD-10-CM

## 2020-01-17 DIAGNOSIS — E04.1 THYROID NODULE: ICD-10-CM

## 2020-01-17 DIAGNOSIS — Z51.81 THERAPEUTIC DRUG MONITORING: ICD-10-CM

## 2020-01-17 LAB
EST. AVERAGE GLUCOSE BLD GHB EST-MCNC: 103 MG/DL (ref 68–126)
HBA1C MFR BLD HPLC: 5.2 % (ref ?–5.7)

## 2020-01-17 PROCEDURE — 83036 HEMOGLOBIN GLYCOSYLATED A1C: CPT | Performed by: NURSE PRACTITIONER

## 2020-01-17 PROCEDURE — 99204 OFFICE O/P NEW MOD 45 MIN: CPT | Performed by: NURSE PRACTITIONER

## 2020-01-17 NOTE — PATIENT INSTRUCTIONS
PLAN:  Stress echo orders  Get a1c lab drawn   Follow up with me in 5 weeks  Schedule follow up appointments:  Dietitian/nutritionist      Please try to work on the following dietary changes:  1.  Drink lots of water and cut down on soda/juice consumption i

## 2020-01-17 NOTE — PROGRESS NOTES
HISTORY OF PRESENT ILLNESS  Patient presents with:  Weight Problem: ref by Ritu Vargas.  no prevoius weight loss meds    Jesús Samuel is a 40year old female new to our office today for initiation of medical weight loss program.  Patient prese level:8-9 /10  Sleep hours and integrity: 6.5 Hours per night    MEDICAL HISTORY  PMH reviewed:   Cardiac disorders:negative   Diabetes: negative  Thyroid disease: thyroid nodule  Renal disease: negative   Kidney stones: negative   Liver disease: negative 12/06/2019    GFRAA 120 12/06/2019    CA 8.8 12/06/2019    OSMOCALC 286 12/06/2019    ALKPHO 121 (H) 12/06/2019    AST 21 12/06/2019    ALT 31 12/06/2019    BILT 0.2 12/06/2019    TP 7.2 12/26/2019    ALB 3.74 (L) 12/26/2019    GLOBULIN 4.5 (H) 12/06/2019 visit.       ASSESSMENT/PLAN  (Z51.81) Therapeutic drug monitoring  (primary encounter diagnosis)  Plan: OP REFERRAL TO [facility] ([facility] USE         ONLY), HEMOGLOBIN A1C, OP REFERRAL TO [facility],        CARD ECHO STRESS ECHO/REST AND         STRESS( increase exercising/activity   Decrease carbs, increase protein  Body composition test results given  No skipping meals   Decrease stress, cynthia work   Decrease portions (ie.  Plastic boxes)  Orders for stress echo  Will send message to HSELLY BROWN- Dr. Dima reid butter and apples, hummus and carrots, berries, nuts (1/4 cup), tuna and crackers                 Protein Shakes: Premier protein or Core Power                Protein Bars: Rx Bars, Oatmega, Power Crunch                 Sargento balanced breaks (cheese and

## 2020-01-20 ENCOUNTER — OFFICE VISIT (OUTPATIENT)
Dept: INTERNAL MEDICINE CLINIC | Facility: CLINIC | Age: 45
End: 2020-01-20
Payer: COMMERCIAL

## 2020-01-20 ENCOUNTER — TELEPHONE (OUTPATIENT)
Dept: INTERNAL MEDICINE CLINIC | Facility: CLINIC | Age: 45
End: 2020-01-20

## 2020-01-20 DIAGNOSIS — E66.9 OBESITY (BMI 30.0-34.9): ICD-10-CM

## 2020-01-20 DIAGNOSIS — K90.0 CELIAC DISEASE: ICD-10-CM

## 2020-01-20 PROCEDURE — 97802 MEDICAL NUTRITION INDIV IN: CPT | Performed by: DIETITIAN, REGISTERED

## 2020-01-20 NOTE — PROGRESS NOTES
INITIAL OUTPATIENT NUTRITION CONSULTATION    Nutrition Assessment    Medical Diagnosis: Obesity and Celiacs disease    Client Age and Gender: 40year old female    Marital Status and Occupation: , works FT, stressful job.   Combined family of 7 c LDL Cholesterol   Date Value Ref Range Status   05/31/2019 122 (H) <100 mg/dL Final     Comment:       Desirable <100 mg/dL   Borderline 100-129 mg/dL   High     >=130mg/dL         LDL CHOLESTROL   Date Value Ref Range Status   08/29/2012 101 <130 mg/d artificially sweetened drinks. Snacks in the evening. Diet lacks vegetables.     Food/Beverage Intake: oral recall  Breakfast: 2 eggs, 1 slice gluten free toast and butter  Lunch: Fettucine lucy and chicken (gluten free  Noodles)  Dinner: 2 cuties, 1 pe

## 2020-01-20 NOTE — TELEPHONE ENCOUNTER
Please tell patient that I heard back by, Dr. Marsha Qiuroz and he would be ok with stimulant medication if we use one. .. however you still need to do your stress echo first and everything be ok before we can prescribe it.

## 2020-01-27 ENCOUNTER — PATIENT MESSAGE (OUTPATIENT)
Dept: INTERNAL MEDICINE CLINIC | Facility: CLINIC | Age: 45
End: 2020-01-27

## 2020-01-27 ENCOUNTER — TELEPHONE (OUTPATIENT)
Dept: INTERNAL MEDICINE CLINIC | Facility: CLINIC | Age: 45
End: 2020-01-27

## 2020-01-27 NOTE — TELEPHONE ENCOUNTER
Pt called states her ins will cover more on her echo if she has it at [facility] pt would like to know if order can be sent to [facility] fax # [phone]    Please call when sent so pt can call and make an appt for test

## 2020-01-27 NOTE — TELEPHONE ENCOUNTER
From: Gopi Richardson  To: MILLIE Valiente  Sent: 1/27/2020 10:03 AM CST  Subject: Referral Request    I have additional questions about the echo test that was ordered.  I am shopping for the best price and need to know exactly the test that is

## 2020-02-03 ENCOUNTER — PATIENT MESSAGE (OUTPATIENT)
Dept: INTERNAL MEDICINE CLINIC | Facility: CLINIC | Age: 45
End: 2020-02-03

## 2020-02-04 ENCOUNTER — TELEPHONE (OUTPATIENT)
Dept: FAMILY MEDICINE CLINIC | Facility: CLINIC | Age: 45
End: 2020-02-04

## 2020-02-04 NOTE — TELEPHONE ENCOUNTER
From: Monica Eubanks  To: MILLIE Barrera  Sent: 2/3/2020 6:07 PM CST  Subject: Referral Request    Hi, this message is for Faith, the nurse who works with [facility].      Faith,    Thank you for sending me the CPT code for the echo rest/stress te

## 2020-02-04 NOTE — TELEPHONE ENCOUNTER
I called S. Dressler's office to check on status of message sent to her. Her nurse, Yessica Lepe, will speak with Sherita Cobb and let us know how to proceed.

## 2020-02-04 NOTE — TELEPHONE ENCOUNTER
[address] Betina Schwartz, is she can't afford it, then we can't do it. I did send a message to her RA specialist Dr. Yasmeen Shafer (I am attaching on your desk)- since I sent it on 1/17/20 and we hadn't heard back.  I was asking him if we could do a stimulant medication (so if he says n

## 2020-02-04 NOTE — TELEPHONE ENCOUNTER
The question wasn't addressed to me - it was addressed to Dr. Carole Rogers. In general, if there is a question, it's probably best to send it to me in a telephone encounter too since it's easy to miss provider messages on a cc'd chart.   From my perspective, I do

## 2020-02-04 NOTE — TELEPHONE ENCOUNTER
Call from faith CALLAHAN w [facility]ria cc'bessy GOLDSTEIN on pt's 1/17/2020 OV with a question. Just following up for input from nelsy. Advised will forward to nelsy GOLDSTEIN now. Faith hamlin w plan.    **harpal-see messg in that OV

## 2020-02-04 NOTE — TELEPHONE ENCOUNTER
I called [facility] as requested by patient and they will not do the stress echo because she is not an established patient there.   They would need to have an order put in my their doctors and a reading physician - since we have no privileges at their [address]

## 2020-02-05 ENCOUNTER — TELEPHONE (OUTPATIENT)
Dept: INTERNAL MEDICINE CLINIC | Facility: CLINIC | Age: 45
End: 2020-02-05

## 2020-02-05 NOTE — TELEPHONE ENCOUNTER
Cortez Hoyt sent office notes with question if Dr. Denis Tay would approve patient taking stimulant after office visit 1/17/2020. There was no response from Dr. Denis Tay. I tried calling to discuss and the office is closed.

## 2020-02-05 NOTE — TELEPHONE ENCOUNTER
I called Dr. Zan Winston to f/u on note sent by [facility]  I spoke with Jesus Lara at Dr. Zan Winston office and she said Dr. Zan Winston responded to the patient who sent a my chart stating he had sent you a note a few weeks ago. The RN could not find the note in epic.   She will ask

## 2020-02-06 NOTE — TELEPHONE ENCOUNTER
I spoke with patient about all. She would like to proceed with stress echo and would like to talk you on Monday between 8 am and 9 am.  Please call her on the cell phone.     [phone]

## 2020-02-06 NOTE — TELEPHONE ENCOUNTER
Brandi Palafox got a message back from Dr. Jabier Nyhan. He saw that I had put in the order for stress ECHO, and he was ok with that plan--> to eval for pulm HTN. (given medical hx of garrick's and pulm nodules).  If we want to do a stimulant medication, we need to do cardiac

## 2020-02-13 ENCOUNTER — TELEPHONE (OUTPATIENT)
Dept: CARDIOLOGY | Age: 45
End: 2020-02-13

## 2020-02-13 DIAGNOSIS — Z51.81 ENCOUNTER FOR THERAPEUTIC DRUG MONITORING: ICD-10-CM

## 2020-02-13 DIAGNOSIS — E66.9 CLASS 1 OBESITY WITH BODY MASS INDEX (BMI) OF 33.0 TO 33.9 IN ADULT, UNSPECIFIED OBESITY TYPE, UNSPECIFIED WHETHER SERIOUS COMORBIDITY PRESENT: ICD-10-CM

## 2020-02-13 DIAGNOSIS — R94.31 NONSPECIFIC ABNORMAL ELECTROCARDIOGRAM (ECG) (EKG): Primary | ICD-10-CM

## 2020-02-14 ENCOUNTER — PATIENT MESSAGE (OUTPATIENT)
Dept: FAMILY MEDICINE CLINIC | Facility: CLINIC | Age: 45
End: 2020-02-14

## 2020-02-14 ENCOUNTER — TELEPHONE (OUTPATIENT)
Dept: CARDIOLOGY | Age: 45
End: 2020-02-14

## 2020-02-14 RX ORDER — FEXOFENADINE HCL AND PSEUDOEPHEDRINE HCI 180; 240 MG/1; MG/1
TABLET, EXTENDED RELEASE ORAL
Qty: 90 TABLET | Refills: 1 | Status: SHIPPED | OUTPATIENT
Start: 2020-02-14 | End: 2020-02-16

## 2020-02-14 NOTE — TELEPHONE ENCOUNTER
From: Chrissy Skinner  To: Munir Samuel PA-C  Sent: 2/14/2020 8:16 AM CST  Subject: Prescription Question    Hi Samantha,    I need to refill the Allegra that you prescribed for me a while ago.  [facility] is going to send you a refill request. I am al

## 2020-02-16 RX ORDER — FEXOFENADINE HCL AND PSEUDOEPHEDRINE HCI 180; 240 MG/1; MG/1
TABLET, EXTENDED RELEASE ORAL
Qty: 30 TABLET | Refills: 0 | Status: SHIPPED | OUTPATIENT
Start: 2020-02-16 | End: 2020-03-09

## 2020-02-20 ENCOUNTER — TELEPHONE (OUTPATIENT)
Dept: INTERNAL MEDICINE CLINIC | Facility: CLINIC | Age: 45
End: 2020-02-20

## 2020-02-20 NOTE — TELEPHONE ENCOUNTER
I called [facility] and spoke with Reanna BASS on 2/20/20 at 1:26 pm and she said no prior authorization is required. They do have patient in their system and verified that I was speaking with correct governing body.    I left patient a message that I spoke with

## 2020-02-21 ENCOUNTER — ANCILLARY PROCEDURE (OUTPATIENT)
Dept: CARDIOLOGY | Age: 45
End: 2020-02-21

## 2020-02-21 DIAGNOSIS — R94.31 NONSPECIFIC ABNORMAL ELECTROCARDIOGRAM (ECG) (EKG): ICD-10-CM

## 2020-02-21 DIAGNOSIS — E66.9 CLASS 1 OBESITY WITH BODY MASS INDEX (BMI) OF 33.0 TO 33.9 IN ADULT, UNSPECIFIED OBESITY TYPE, UNSPECIFIED WHETHER SERIOUS COMORBIDITY PRESENT: ICD-10-CM

## 2020-02-21 DIAGNOSIS — Z51.81 ENCOUNTER FOR THERAPEUTIC DRUG MONITORING: ICD-10-CM

## 2020-02-21 PROCEDURE — 93351 STRESS TTE COMPLETE: CPT | Performed by: INTERNAL MEDICINE

## 2020-02-23 NOTE — PROGRESS NOTES
HISTORY OF PRESENT ILLNESS  Patient presents with:  Weight Check: lost 4 pounds    Nehemias Pisano is a 40year old female here for follow up with medical weight loss program for the treatment of overweight, obesity, or morbid obesity.  Patient has lost Social hx and PMH reviewed. Employed in entertaining w/ food (very stressful).  , good support     REVIEW OF SYSTEMS  GENERAL: feels well otherwise  LUNGS: denies shortness of breath with exertion, no apnea  CARDIOVASCULAR: denies chest pain on exert Patsy 144 (H) 05/31/2019     Lab Results   Component Value Date    B12 495 05/31/2019     Lab Results   Component Value Date    VITD 57.2 05/31/2019       OXYBUTYNIN CHLORIDE ER 5 MG Oral Tablet 24 Hr, TAKE 1 TABLET DAILY, Disp: 60 tablet, Rfl: 3  [place]stra · Initial consult: #226 lbs on 1/2020 # Down 4 lb from previous visit  · Down 4 lbs total  · Continue with medications: wasn't started on any medications for weight loss (wants to wait till stress echo results and if normal, would like to try phentermine) 2. Drink lots of water and cut down on soda/juice consumption if soda/juice drinker  3. Focus on protein: (15-30 grams with each meal) ie. greek yogurt, cottage cheese, string cheese, hard boiled eggs  4.  Healthy snacks: peanut butter and apples, hummus an -dried edamame   -prasanna seeds soaked in water or almond milk  -soy nuts  -cured meats (monitor for sodium issues)   -hummus with vegetables  -bean dip with vegetables     FRUIT  Low carb fruit options   Raspberries: Half a cup (60 grams) contains 3 grams of

## 2020-02-24 ENCOUNTER — TELEPHONE (OUTPATIENT)
Dept: INTERNAL MEDICINE CLINIC | Facility: CLINIC | Age: 45
End: 2020-02-24

## 2020-02-24 ENCOUNTER — OFFICE VISIT (OUTPATIENT)
Dept: INTERNAL MEDICINE CLINIC | Facility: CLINIC | Age: 45
End: 2020-02-24
Payer: COMMERCIAL

## 2020-02-24 VITALS
WEIGHT: 222 LBS | DIASTOLIC BLOOD PRESSURE: 82 MMHG | SYSTOLIC BLOOD PRESSURE: 122 MMHG | RESPIRATION RATE: 16 BRPM | HEIGHT: 69 IN | BODY MASS INDEX: 32.88 KG/M2 | HEART RATE: 77 BPM

## 2020-02-24 DIAGNOSIS — K90.0 CELIAC DISEASE: ICD-10-CM

## 2020-02-24 DIAGNOSIS — Z51.81 THERAPEUTIC DRUG MONITORING: Primary | ICD-10-CM

## 2020-02-24 DIAGNOSIS — R94.31 ABNORMAL EKG: ICD-10-CM

## 2020-02-24 DIAGNOSIS — E53.8 VITAMIN B12 DEFICIENCY: ICD-10-CM

## 2020-02-24 DIAGNOSIS — E55.9 VITAMIN D DEFICIENCY: ICD-10-CM

## 2020-02-24 DIAGNOSIS — M31.30 GRANULOMATOSIS WITH POLYANGIITIS, UNSPECIFIED WHETHER RENAL INVOLVEMENT (HCC): ICD-10-CM

## 2020-02-24 LAB — STRESS TARGET HR: 176 BPM

## 2020-02-24 PROCEDURE — 99213 OFFICE O/P EST LOW 20 MIN: CPT | Performed by: NURSE PRACTITIONER

## 2020-02-24 NOTE — PATIENT INSTRUCTIONS
We are here to support you with weight loss, but please remember that you still need your primary care provider for your routine health maintenance.       PLAN:  Will wait on stress echo results  Follow up with me in 1 month  Schedule follow up appointments ** Daily INPUT> Look at nutrition section-- \"nutrients\" and it will break down your macros for the day (ie. Protein, carbs, fibers, sugars and fats). Try to stay within these numbers daily     2.  \"7 minute workout\" to help with exercise/a

## 2020-02-24 NOTE — TELEPHONE ENCOUNTER
Outside stress echo results are normal.  I saw the patient today and she is ok with starting phentermine. Will pend phentermine 15mg (quant #30 no refill)- BUT Ask what pharm she wants it sent to. ... Please tell her how to take.  thanks

## 2020-02-24 NOTE — TELEPHONE ENCOUNTER
Received faxed results of Stress Echo from [facility] completed for patient on 2/21/2020. Placed in your inbox.

## 2020-02-25 ENCOUNTER — TELEPHONE (OUTPATIENT)
Dept: INTERNAL MEDICINE CLINIC | Facility: CLINIC | Age: 45
End: 2020-02-25

## 2020-02-25 RX ORDER — PHENTERMINE HYDROCHLORIDE 15 MG/1
15 CAPSULE ORAL EVERY MORNING
Qty: 30 CAPSULE | Refills: 0 | Status: SHIPPED | OUTPATIENT
Start: 2020-02-25 | End: 2020-04-02

## 2020-02-25 NOTE — TELEPHONE ENCOUNTER
Received notice in epic that phentermine needs a prior authorization.   Questions answered and approved    Prior authorization approved Case ID: [id number]      Payer:  Alex [address]    [phone]     [phone]    [id number]:[id number]

## 2020-03-01 DIAGNOSIS — N39.41 URGE INCONTINENCE: ICD-10-CM

## 2020-03-03 RX ORDER — OXYBUTYNIN CHLORIDE 5 MG/1
TABLET, EXTENDED RELEASE ORAL
Qty: 90 TABLET | Refills: 3 | Status: SHIPPED | OUTPATIENT
Start: 2020-03-03 | End: 2020-07-07

## 2020-03-10 RX ORDER — FEXOFENADINE HCL AND PSEUDOEPHEDRINE HCI 180; 240 MG/1; MG/1
TABLET, EXTENDED RELEASE ORAL
Qty: 30 TABLET | Refills: 1 | Status: SHIPPED | OUTPATIENT
Start: 2020-03-10 | End: 2021-07-06 | Stop reason: ALTCHOICE

## 2020-03-10 RX ORDER — FEXOFENADINE HCL AND PSEUDOEPHEDRINE HCI 180; 240 MG/1; MG/1
TABLET, EXTENDED RELEASE ORAL
Qty: 30 TABLET | Refills: 0 | Status: SHIPPED | OUTPATIENT
Start: 2020-03-10 | End: 2020-03-10

## 2020-03-10 NOTE — TELEPHONE ENCOUNTER
Pharm called in  Not passing protocol at their end (controlled subs)   Need to be printed and   Or need to be signed/co signed by supervising doctor    Rx pended

## 2020-03-11 ENCOUNTER — MED REC SCAN ONLY (OUTPATIENT)
Dept: FAMILY MEDICINE CLINIC | Facility: CLINIC | Age: 45
End: 2020-03-11

## 2020-03-27 RX ORDER — PHENTERMINE HYDROCHLORIDE 15 MG/1
15 CAPSULE ORAL EVERY MORNING
Qty: 30 CAPSULE | Refills: 0 | Status: CANCELLED | OUTPATIENT
Start: 2020-03-27

## 2020-04-02 ENCOUNTER — TELEPHONE (OUTPATIENT)
Dept: INTERNAL MEDICINE CLINIC | Facility: CLINIC | Age: 45
End: 2020-04-02

## 2020-04-02 ENCOUNTER — VIRTUAL PHONE E/M (OUTPATIENT)
Dept: INTERNAL MEDICINE CLINIC | Facility: CLINIC | Age: 45
End: 2020-04-02
Payer: COMMERCIAL

## 2020-04-02 DIAGNOSIS — F43.9 STRESS: ICD-10-CM

## 2020-04-02 DIAGNOSIS — Z51.81 THERAPEUTIC DRUG MONITORING: Primary | ICD-10-CM

## 2020-04-02 DIAGNOSIS — E53.8 VITAMIN B12 DEFICIENCY: ICD-10-CM

## 2020-04-02 DIAGNOSIS — E55.9 VITAMIN D DEFICIENCY: ICD-10-CM

## 2020-04-02 PROCEDURE — 99442 PHONE E/M BY PHYS 11-20 MIN: CPT | Performed by: NURSE PRACTITIONER

## 2020-04-02 RX ORDER — PHENTERMINE HYDROCHLORIDE 37.5 MG/1
37.5 TABLET ORAL
Qty: 30 TABLET | Refills: 0 | Status: SHIPPED | OUTPATIENT
Start: 2020-04-02 | End: 2020-04-29

## 2020-04-02 NOTE — TELEPHONE ENCOUNTER
Received notice in epic to do PA for phentermine.  Questions answered and approved    Approved     Prior authorization approved Case ID: [id number]      Payer:  Alex [address]    [phone]     [phone]    [id number];

## 2020-04-02 NOTE — PROGRESS NOTES
Virtual/Telephone Check-In    Gopi Richardson verbally consents to a Virtual/Telephone Check-In service on 04/02/20. Patient understands and accepts financial responsibility for any deductible, co-insurance and/or co-pays associated with this service.

## 2020-04-02 NOTE — PATIENT INSTRUCTIONS
We are here to support you with weight loss, but please remember that you still need your primary care provider for your routine health maintenance.       PLAN:  Will increase phentermine 37.5mg   Follow up with me in 1 month  Schedule follow up appointment ** Daily INPUT> Look at nutrition section-- \"nutrients\" and it will break down your macros for the day (ie. Protein, carbs, fibers, sugars and fats). Try to stay within these numbers daily     2.  \"7 minute workout\" to help with exercise/a

## 2020-04-29 ENCOUNTER — TELEMEDICINE (OUTPATIENT)
Dept: INTERNAL MEDICINE CLINIC | Facility: CLINIC | Age: 45
End: 2020-04-29
Payer: COMMERCIAL

## 2020-04-29 VITALS — WEIGHT: 211 LBS | BODY MASS INDEX: 31 KG/M2

## 2020-04-29 DIAGNOSIS — E53.8 VITAMIN B12 DEFICIENCY: ICD-10-CM

## 2020-04-29 DIAGNOSIS — F43.9 STRESS: ICD-10-CM

## 2020-04-29 DIAGNOSIS — Z51.81 THERAPEUTIC DRUG MONITORING: Primary | ICD-10-CM

## 2020-04-29 DIAGNOSIS — E55.9 VITAMIN D DEFICIENCY: ICD-10-CM

## 2020-04-29 PROCEDURE — 99213 OFFICE O/P EST LOW 20 MIN: CPT | Performed by: NURSE PRACTITIONER

## 2020-04-29 RX ORDER — PHENTERMINE HYDROCHLORIDE 37.5 MG/1
37.5 TABLET ORAL
Qty: 30 TABLET | Refills: 1 | Status: SHIPPED | OUTPATIENT
Start: 2020-04-29 | End: 2020-06-26

## 2020-04-29 NOTE — PATIENT INSTRUCTIONS
We are here to support you with weight loss, but please remember that you still need your primary care provider for your routine health maintenance.       PLAN:  Continue with phentermine 37.5mg   Follow up with me in 2 month  Schedule follow up appointment ** Daily INPUT> Look at nutrition section-- \"nutrients\" and it will break down your macros for the day (ie. Protein, carbs, fibers, sugars and fats). Try to stay within these numbers daily     2.  \"7 minute workout\" to help with exercise/a

## 2020-04-29 NOTE — PROGRESS NOTES
Virtual Telephone Check-In    Saul Birmingham verbally consents to a Virtual/Telephone Check-In visit on 4/29/2020.     Patient understands and accepts financial responsibility for any deductible, co-insurance and/or co-pays associated with this service denies any mood changes     Physical Exam:  Appropriate affect and mood, normal logic and thought process   Patient speaking in full sentences, no increased work of breathing    Assessment/Plan:   Diagnoses and all orders for this visit:    Therapeutic gurjit

## 2020-06-25 PROBLEM — M31.30 WEGENER'S VASCULITIS: Status: ACTIVE | Noted: 2020-02-28

## 2020-06-26 ENCOUNTER — VIRTUAL PHONE E/M (OUTPATIENT)
Dept: INTERNAL MEDICINE CLINIC | Facility: CLINIC | Age: 45
End: 2020-06-26
Payer: COMMERCIAL

## 2020-06-26 DIAGNOSIS — F43.9 STRESS: ICD-10-CM

## 2020-06-26 DIAGNOSIS — Z51.81 THERAPEUTIC DRUG MONITORING: Primary | ICD-10-CM

## 2020-06-26 DIAGNOSIS — E55.9 VITAMIN D DEFICIENCY: ICD-10-CM

## 2020-06-26 DIAGNOSIS — E53.8 VITAMIN B12 DEFICIENCY: ICD-10-CM

## 2020-06-26 PROCEDURE — 99213 OFFICE O/P EST LOW 20 MIN: CPT | Performed by: NURSE PRACTITIONER

## 2020-06-26 RX ORDER — PHENTERMINE HYDROCHLORIDE 37.5 MG/1
37.5 TABLET ORAL
Qty: 30 TABLET | Refills: 1 | Status: SHIPPED | OUTPATIENT
Start: 2020-06-26 | End: 2020-07-27

## 2020-06-26 RX ORDER — TOPIRAMATE 25 MG/1
25 TABLET ORAL 2 TIMES DAILY
Qty: 60 TABLET | Refills: 1 | Status: SHIPPED | OUTPATIENT
Start: 2020-06-26 | End: 2020-07-27

## 2020-06-26 NOTE — PROGRESS NOTES
Virtual Telephone Check-In    Saul Birmingham verbally consents to a Virtual/Telephone Check-In visit on 6/26/2020.     Patient understands and accepts financial responsibility for any deductible, co-insurance and/or co-pays associated with this service denies any mood changes     Physical Exam:  Appropriate affect and mood, normal logic and thought process   Patient speaking in full sentences, no increased work of breathing    Assessment/Plan:   Diagnoses and all orders for this visit:    Therapeutic ugrjit

## 2020-06-26 NOTE — PATIENT INSTRUCTIONS
We are here to support you with weight loss, but please remember that you still need your primary care provider for your routine health maintenance.       PLAN:  Will continue with phentermine 37.5mg and add topamax: take 1 tablet before dinner for the firs Goals should include:                  Lose 1.5-2 lbs per week                Activity level: not very active (can't count exercise towards calorie number per day)                   ** Daily INPUT> Look at nutrition section-- \"nutrients\" and

## 2020-07-07 ENCOUNTER — OFFICE VISIT (OUTPATIENT)
Dept: UROLOGY | Facility: HOSPITAL | Age: 45
End: 2020-07-07
Attending: OBSTETRICS & GYNECOLOGY
Payer: COMMERCIAL

## 2020-07-07 VITALS
DIASTOLIC BLOOD PRESSURE: 60 MMHG | HEIGHT: 69 IN | BODY MASS INDEX: 31.1 KG/M2 | SYSTOLIC BLOOD PRESSURE: 118 MMHG | WEIGHT: 210 LBS

## 2020-07-07 DIAGNOSIS — N81.84 PELVIC MUSCLE WASTING: ICD-10-CM

## 2020-07-07 DIAGNOSIS — N39.41 URGE INCONTINENCE: Primary | ICD-10-CM

## 2020-07-07 DIAGNOSIS — N95.2 POSTMENOPAUSAL ATROPHIC VAGINITIS: ICD-10-CM

## 2020-07-07 PROCEDURE — 99212 OFFICE O/P EST SF 10 MIN: CPT

## 2020-07-07 RX ORDER — OXYBUTYNIN CHLORIDE 5 MG/1
5 TABLET, EXTENDED RELEASE ORAL DAILY
Qty: 90 TABLET | Refills: 3 | Status: SHIPPED | OUTPATIENT
Start: 2020-07-07 | End: 2021-07-06

## 2020-07-07 RX ORDER — OXYBUTYNIN CHLORIDE 5 MG/1
5 TABLET, EXTENDED RELEASE ORAL DAILY
Qty: 90 TABLET | Refills: 3 | Status: SHIPPED
Start: 2020-07-07 | End: 2020-07-07 | Stop reason: CLARIF

## 2020-07-07 RX ORDER — OXYBUTYNIN CHLORIDE 5 MG/1
5 TABLET, EXTENDED RELEASE ORAL DAILY
Qty: 90 TABLET | Refills: 3 | Status: SHIPPED | OUTPATIENT
Start: 2020-07-07 | End: 2020-07-07

## 2020-07-07 RX ORDER — ESTRADIOL 0.1 MG/G
CREAM VAGINAL
Qty: 1 TUBE | Refills: 3 | Status: SHIPPED | OUTPATIENT
Start: 2020-07-07 | End: 2020-07-07 | Stop reason: CLARIF

## 2020-07-07 RX ORDER — ESTRADIOL 0.1 MG/G
CREAM VAGINAL
Qty: 1 TUBE | Refills: 3 | Status: SHIPPED | OUTPATIENT
Start: 2020-07-07 | End: 2020-07-07

## 2020-07-07 RX ORDER — ESTRADIOL 0.1 MG/G
CREAM VAGINAL
Qty: 1 TUBE | Refills: 3 | Status: SHIPPED | OUTPATIENT
Start: 2020-07-07

## 2020-07-07 NOTE — PROGRESS NOTES
Patient presents to follow up OAB    She is currently using oxybutynin ER 5mg daily    She reports +improvement and reports no dry mouth, but struggling some with constipation  Denies other significant side effects     Gyne visit q year    Uses NTF prn wit

## 2020-07-20 RX ORDER — TOPIRAMATE 25 MG/1
25 TABLET ORAL 2 TIMES DAILY
Qty: 60 TABLET | Refills: 1 | OUTPATIENT
Start: 2020-07-20 | End: 2020-08-19

## 2020-07-20 NOTE — TELEPHONE ENCOUNTER
Requesting topiramate 25 mg  LOV: 6/26/20  RTC: one month  Last Relevant Labs: na  Filled: 6/25/20 #60 with 1 refills    Future Appointments   Date Time Provider Lianet Ash   7/25/2020 10:00 AM [facility] CT MAIN RM1 [facility] AT [facility]   7/27/2020  7:40 AM F

## 2020-07-25 ENCOUNTER — HOSPITAL ENCOUNTER (OUTPATIENT)
Dept: CT IMAGING | Facility: HOSPITAL | Age: 45
Discharge: HOME | End: 2020-07-25
Attending: INTERNAL MEDICINE
Payer: COMMERCIAL

## 2020-07-25 DIAGNOSIS — R91.8 MULTIPLE PULMONARY NODULES: ICD-10-CM

## 2020-07-25 PROCEDURE — 71250 CT THORAX DX C-: CPT | Performed by: INTERNAL MEDICINE

## 2020-07-27 ENCOUNTER — VIRTUAL PHONE E/M (OUTPATIENT)
Dept: INTERNAL MEDICINE CLINIC | Facility: CLINIC | Age: 45
End: 2020-07-27

## 2020-07-27 ENCOUNTER — PATIENT MESSAGE (OUTPATIENT)
Dept: FAMILY MEDICINE CLINIC | Facility: CLINIC | Age: 45
End: 2020-07-27

## 2020-07-27 VITALS — WEIGHT: 207 LBS | BODY MASS INDEX: 31 KG/M2

## 2020-07-27 DIAGNOSIS — F43.9 STRESS: ICD-10-CM

## 2020-07-27 DIAGNOSIS — E55.9 VITAMIN D DEFICIENCY: ICD-10-CM

## 2020-07-27 DIAGNOSIS — Z51.81 THERAPEUTIC DRUG MONITORING: Primary | ICD-10-CM

## 2020-07-27 DIAGNOSIS — E66.9 CLASS 1 OBESITY WITHOUT SERIOUS COMORBIDITY WITH BODY MASS INDEX (BMI) OF 31.0 TO 31.9 IN ADULT, UNSPECIFIED OBESITY TYPE: ICD-10-CM

## 2020-07-27 DIAGNOSIS — E53.8 VITAMIN B12 DEFICIENCY: ICD-10-CM

## 2020-07-27 PROCEDURE — 99213 OFFICE O/P EST LOW 20 MIN: CPT | Performed by: NURSE PRACTITIONER

## 2020-07-27 RX ORDER — PHENTERMINE HYDROCHLORIDE 37.5 MG/1
37.5 TABLET ORAL
Qty: 30 TABLET | Refills: 1 | Status: SHIPPED | OUTPATIENT
Start: 2020-07-29 | End: 2020-10-22

## 2020-07-27 RX ORDER — TOPIRAMATE 25 MG/1
25 TABLET ORAL 2 TIMES DAILY
Qty: 180 TABLET | Refills: 0 | Status: SHIPPED | OUTPATIENT
Start: 2020-07-29 | End: 2020-09-28

## 2020-07-27 NOTE — TELEPHONE ENCOUNTER
Attempted to contact pt, reached identified mia clemente for pt to cb and confirm that she received the result stating that the additional views were for both breasts. Also advised that this would be a proactive thing to get done.   Looking back at previous resu

## 2020-07-27 NOTE — TELEPHONE ENCOUNTER
From: Saul Birmingham  To: Stone Álvarez PA-C  Sent: 7/27/2020 12:11 PM CDT  Subject: Referral Request    Alex Morrow,    I have an order for a diagnostic mammogram on the right breast to get additional views.  This is related to my preventive ma

## 2020-07-27 NOTE — PATIENT INSTRUCTIONS
We are here to support you with weight loss, but please remember that you still need your primary care provider for your routine health maintenance.       PLAN:  Will continue with phentermine and topamax 25mg bid    Follow up with me in 2 month  Schedule f ** Daily INPUT> Look at nutrition section-- \"nutrients\" and it will break down your macros for the day (ie. Protein, carbs, fibers, sugars and fats). Try to stay within these numbers daily     2.  \"7 minute workout\" to help with exercise/a

## 2020-07-27 NOTE — TELEPHONE ENCOUNTER
Please call this patient. I am confused about her message because her last mammogram report from December says additional views required for both breasts.

## 2020-07-27 NOTE — TELEPHONE ENCOUNTER
Laureen Camacho,    Would you like to respond to patient.   It will have greater impact of importance coming from provider, to get it done

## 2020-07-27 NOTE — PROGRESS NOTES
Virtual Telephone Check-In    Jesús Samuel verbally consents to a Virtual/Telephone Check-In visit on 7/27/2020.     Patient understands and accepts financial responsibility for any deductible, co-insurance and/or co-pays associated with this service changes  Psych: denies any mood changes     Physical Exam:  Appropriate affect and mood, normal logic and thought process   Patient speaking in full sentences, no increased work of breathing    Assessment/Plan:   Diagnoses and all orders for this visit:

## 2020-07-28 ENCOUNTER — TELEPHONE (OUTPATIENT)
Dept: INTERNAL MEDICINE CLINIC | Facility: CLINIC | Age: 45
End: 2020-07-28

## 2020-07-28 ENCOUNTER — TELEPHONE (OUTPATIENT)
Dept: FAMILY MEDICINE CLINIC | Facility: CLINIC | Age: 45
End: 2020-07-28

## 2020-07-28 DIAGNOSIS — R92.2 INCONCLUSIVE MAMMOGRAM: Primary | ICD-10-CM

## 2020-07-28 NOTE — TELEPHONE ENCOUNTER
Medina Medley does not have a 90 count allergy blood panel. Max is panel of 25. sts did see mention of a 90 count panel online. Call to pt-advised of nelsy GOLDSTEIN comments and above info from riky.  Pt sts she will advise chiropractor and proceed as h

## 2020-07-28 NOTE — TELEPHONE ENCOUNTER
I don't believe we have a 90 count allergy panel available at [address] could check with Za Ovalles to see.   In my experience, blood testing for allergies is sometimes not covered by insurance and can be very expensive so I generally refer to the allergist for a

## 2020-07-28 NOTE — TELEPHONE ENCOUNTER
Call back from pt-advised of all info noted below from nelsy GOLDSTEIN. Reviewed last screening mamm findings and recommendations-done 12/19/2019-noting recommendation for bilateral diagnostic mamm.   Pt sts letter she received from arun was not clear that b

## 2020-07-28 NOTE — TELEPHONE ENCOUNTER
Please try to contact her again - I think maybe she thinks the repeat views are only for one breast but in fact radiology is recommending additional views for both breasts.    With regard to her question concerning doing a diagnostic mammogram annually victor manuel

## 2020-07-28 NOTE — TELEPHONE ENCOUNTER
Received request for PA for phentermine in Epic   Questions answered and RX approved  Prior authorization approved Case ID: [id number]      Payer:  Alex Edward 19    [phone]     [phone]    [facility]:[id number];KFP:[id number]; Review Type:

## 2020-07-28 NOTE — TELEPHONE ENCOUNTER
Keisha Moreno from mammography calling. Pt never came back to get additional views so now they need order for diagnostic bilateral mammogram w/ultrasound if needed.

## 2020-07-29 NOTE — TELEPHONE ENCOUNTER
Ruthie/[facility] called back earlier today. Advised of pt's extensive question re need for daignostic mamm, screening mamm again in December, etc.   Advised pt may bring up these same questions when they contact her to schedule.  Referred to jon

## 2020-07-29 NOTE — TELEPHONE ENCOUNTER
Call to [facility] reaches identified voice mail. Left m req call back before calling to schedule this pt. Provided our ofc  line #-to speak w me.

## 2020-08-07 ENCOUNTER — HOSPITAL ENCOUNTER (OUTPATIENT)
Dept: MAMMOGRAPHY | Facility: HOSPITAL | Age: 45
Discharge: HOME | End: 2020-08-07
Attending: FAMILY MEDICINE
Payer: COMMERCIAL

## 2020-08-07 DIAGNOSIS — R92.2 INCONCLUSIVE MAMMOGRAM: ICD-10-CM

## 2020-08-07 PROCEDURE — 77062 BREAST TOMOSYNTHESIS BI: CPT | Performed by: FAMILY MEDICINE

## 2020-08-07 PROCEDURE — 77066 DX MAMMO INCL CAD BI: CPT | Performed by: FAMILY MEDICINE

## 2020-08-07 PROCEDURE — 76641 ULTRASOUND BREAST COMPLETE: CPT | Performed by: FAMILY MEDICINE

## 2020-08-17 ENCOUNTER — TELEPHONE (OUTPATIENT)
Dept: INTERNAL MEDICINE CLINIC | Facility: CLINIC | Age: 45
End: 2020-08-17

## 2020-08-17 RX ORDER — PHENTERMINE HYDROCHLORIDE 37.5 MG/1
37.5 TABLET ORAL
Qty: 30 TABLET | Refills: 1 | OUTPATIENT
Start: 2020-08-17

## 2020-08-17 NOTE — TELEPHONE ENCOUNTER
Requesting Phentermine 37.5 mg  LOV: 7/27/20  RTC: 8-10 weeks  Last Relevant Labs: na  Filled: 7/29/20 #30 with 1 refills    Future Appointments   Date Time Provider Lianet Ash   9/18/2020 12:30 PM Dressler, Raina Roch, PA-C [facility] Is[facility] Frye   7/6/2

## 2020-09-04 ENCOUNTER — OFFICE VISIT (OUTPATIENT)
Dept: OTOLARYNGOLOGY | Facility: CLINIC | Age: 45
End: 2020-09-04
Payer: COMMERCIAL

## 2020-09-04 VITALS
HEART RATE: 78 BPM | WEIGHT: 210 LBS | HEIGHT: 69 IN | BODY MASS INDEX: 31.1 KG/M2 | SYSTOLIC BLOOD PRESSURE: 116 MMHG | DIASTOLIC BLOOD PRESSURE: 82 MMHG

## 2020-09-04 DIAGNOSIS — J34.89 NASAL OBSTRUCTION: ICD-10-CM

## 2020-09-04 DIAGNOSIS — H60.339 CHRONIC SWIMMER'S EAR, UNSPECIFIED LATERALITY: Primary | ICD-10-CM

## 2020-09-04 DIAGNOSIS — H60.543 DERMATITIS OF EAR CANAL, BILATERAL: ICD-10-CM

## 2020-09-04 PROCEDURE — 3008F BODY MASS INDEX DOCD: CPT | Performed by: OTOLARYNGOLOGY

## 2020-09-04 PROCEDURE — 3074F SYST BP LT 130 MM HG: CPT | Performed by: OTOLARYNGOLOGY

## 2020-09-04 PROCEDURE — 3079F DIAST BP 80-89 MM HG: CPT | Performed by: OTOLARYNGOLOGY

## 2020-09-04 PROCEDURE — 99243 OFF/OP CNSLTJ NEW/EST LOW 30: CPT | Performed by: OTOLARYNGOLOGY

## 2020-09-04 PROCEDURE — 92504 EAR MICROSCOPY EXAMINATION: CPT | Performed by: OTOLARYNGOLOGY

## 2020-09-04 RX ORDER — AZELASTINE HCL 205.5 UG/1
2 SPRAY NASAL DAILY
Qty: 1 EACH | Refills: 11 | Status: SHIPPED | OUTPATIENT
Start: 2020-09-04 | End: 2020-11-21

## 2020-09-04 RX ORDER — NEOMYCIN SULFATE, POLYMYXIN B SULFATE AND HYDROCORTISONE 10; 3.5; 1 MG/ML; MG/ML; [USP'U]/ML
3 SUSPENSION/ DROPS AURICULAR (OTIC) 2 TIMES DAILY
Qty: 1 BOTTLE | Refills: 2 | Status: SHIPPED | OUTPATIENT
Start: 2020-09-04 | End: 2020-12-30

## 2020-09-04 NOTE — PROGRESS NOTES
Tito King is a 40year old female. Patient presents with:  Ear Problem: New patient, reports bilateral ear itching x 4 years. denies any drainage noted.       HISTORY OF PRESENT ILLNESS   9/4/2020    The patient presents complaints of annoying it Relation Age of Onset   • Heart Attack Mother 77        Acute MI   • High Cholesterol Mother         Hypercholesterolemia   • Heart Disease Mother    • Diabetes Brother    • High Cholesterol Father         Hypercholesterolemia   • Heart Disorder Father 116/82   Pulse 78   Ht 5' 9\" (1.753 m)   Wt 210 lb (95.3 kg)   LMP 01/01/2020 (Approximate)   BMI 31.01 kg/m²        Constitutional Normal Overall appearance - Normal.   Psychiatric Normal Orientation - Oriented to time, place, person & situation.  Appropr Chloride ER 5 MG Oral Tablet 24 Hr, Take 1 tablet (5 mg total) by mouth daily. , Disp: 90 tablet, Rfl: 3  •  Estradiol (ESTRACE) 0.1 MG/GM Vaginal Cream, 1/2 gram twice weekly per vagina, Disp: 1 Tube, Rfl: 3  •  Nitrofurantoin Macrocrystal (MACRODANTIN) 50 need a longer course of this and she may need the addition of Diflucan or oral antibiotics should this not resolve. - EAR MICROSCOPY EXAMINATION    2. Dermatitis of ear canal, bilateral  Standing from Q-tip abuse.   I discussed the fact that we need to natan

## 2020-09-10 ENCOUNTER — MED REC SCAN ONLY (OUTPATIENT)
Dept: FAMILY MEDICINE CLINIC | Facility: CLINIC | Age: 45
End: 2020-09-10

## 2020-09-21 ENCOUNTER — OFFICE VISIT (OUTPATIENT)
Dept: OTOLARYNGOLOGY | Facility: CLINIC | Age: 45
End: 2020-09-21
Payer: COMMERCIAL

## 2020-09-21 VITALS
SYSTOLIC BLOOD PRESSURE: 118 MMHG | HEIGHT: 69 IN | TEMPERATURE: 99 F | DIASTOLIC BLOOD PRESSURE: 74 MMHG | BODY MASS INDEX: 31.1 KG/M2 | WEIGHT: 210 LBS

## 2020-09-21 DIAGNOSIS — H60.543 DERMATITIS OF EAR CANAL, BILATERAL: ICD-10-CM

## 2020-09-21 DIAGNOSIS — J34.89 NASAL OBSTRUCTION: Primary | ICD-10-CM

## 2020-09-21 PROCEDURE — 3074F SYST BP LT 130 MM HG: CPT | Performed by: OTOLARYNGOLOGY

## 2020-09-21 PROCEDURE — 3008F BODY MASS INDEX DOCD: CPT | Performed by: OTOLARYNGOLOGY

## 2020-09-21 PROCEDURE — 99214 OFFICE O/P EST MOD 30 MIN: CPT | Performed by: OTOLARYNGOLOGY

## 2020-09-21 PROCEDURE — 3078F DIAST BP <80 MM HG: CPT | Performed by: OTOLARYNGOLOGY

## 2020-09-21 RX ORDER — FLUOCINOLONE ACETONIDE 0.11 MG/ML
3 OIL AURICULAR (OTIC) DAILY
Qty: 1 BOTTLE | Refills: 1 | Status: SHIPPED | OUTPATIENT
Start: 2020-09-21 | End: 2020-09-28

## 2020-09-21 NOTE — PROGRESS NOTES
Muriel Argueta is a 39year old female. Patient presents with:   Follow - Up: 2 week follow up- dermatitis of ear canal, bilateral- per pt there is changes/improvement of symptoms      HISTORY OF PRESENT ILLNESS   9/4/2020    The patient presents comp Marital status:       Spouse name: Not on file      Number of children: Not on file      Years of education: Not on file      Highest education level: Not on file    Tobacco Use      Smoking status: Never Smoker      Smokeless tobacco: Never Used ENMT Negative Headaches and neck pain   Eyes Negative Blurred vision and vision changes. Respiratory Negative Dyspnea and wheezing. Cardio Negative Chest pain, irregular heartbeat/palpitations and syncope. GI Negative Abdominal pain and diarrhea. by Nasal route daily. , Disp: 1 each, Rfl: 11  •  Phentermine HCl 37.5 MG Oral Tab, Take 1 tablet (37.5 mg total) by mouth every morning before breakfast., Disp: 30 tablet, Rfl: 1  •  Oxybutynin Chloride ER 5 MG Oral Tablet 24 Hr, Take 1 tablet (5 mg total) of using the Q-tips in the way would and I do this is have her avoid using any Q-tips start Cortisporin otic suspension twice a day in both ears for 14 days and then I am to see her back.   We may continue this or we may add Diflucan and or just plain stero

## 2020-09-23 ENCOUNTER — TELEPHONE (OUTPATIENT)
Dept: OTOLARYNGOLOGY | Facility: CLINIC | Age: 45
End: 2020-09-23

## 2020-09-27 RX ORDER — TOPIRAMATE 25 MG/1
TABLET ORAL
Qty: 60 TABLET | Refills: 2 | OUTPATIENT
Start: 2020-09-27

## 2020-09-27 NOTE — TELEPHONE ENCOUNTER
Requesting Topiramate 25 mg  LOV: 7/27/20  RTC: 8-10 weeks  Last Relevant Labs: na  Filled: 7/29/20 #180 with 0 refills    Future Appointments   Date Time Provider Lianet Ash   10/22/2020  8:00 AM PEPE Hill [facility]     Re

## 2020-09-28 RX ORDER — TOPIRAMATE 25 MG/1
25 TABLET ORAL 2 TIMES DAILY
Qty: 60 TABLET | Refills: 0 | Status: SHIPPED | OUTPATIENT
Start: 2020-09-28 | End: 2020-10-28

## 2020-09-28 NOTE — TELEPHONE ENCOUNTER
After denial of topiramate, I received a fax from [facility] stating they do not have the approval of #180 sent to them on 7/29/20. I will send one month now since it was to go through October and she can get refill at next office visit.

## 2020-10-02 NOTE — TELEPHONE ENCOUNTER
Patient is scheduled for surgery with Dr Alize Sommers on 11/13/2020. Pre post op/NSAID instructions reviewed. Patient is aware to stop phentermine 1 week prior to surgery.

## 2020-10-22 ENCOUNTER — VIRTUAL PHONE E/M (OUTPATIENT)
Dept: INTERNAL MEDICINE CLINIC | Facility: CLINIC | Age: 45
End: 2020-10-22
Payer: COMMERCIAL

## 2020-10-22 DIAGNOSIS — Z51.81 THERAPEUTIC DRUG MONITORING: Primary | ICD-10-CM

## 2020-10-22 DIAGNOSIS — E66.9 CLASS 1 OBESITY WITHOUT SERIOUS COMORBIDITY WITH BODY MASS INDEX (BMI) OF 31.0 TO 31.9 IN ADULT, UNSPECIFIED OBESITY TYPE: ICD-10-CM

## 2020-10-22 PROCEDURE — 99213 OFFICE O/P EST LOW 20 MIN: CPT | Performed by: PHYSICIAN ASSISTANT

## 2020-10-22 RX ORDER — PHENTERMINE HYDROCHLORIDE 37.5 MG/1
37.5 TABLET ORAL
Qty: 30 TABLET | Refills: 1 | Status: SHIPPED | OUTPATIENT
Start: 2020-10-22 | End: 2020-12-29

## 2020-10-22 RX ORDER — TOPIRAMATE 25 MG/1
1 CAPSULE, EXTENDED RELEASE ORAL DAILY
Qty: 30 CAPSULE | Refills: 0 | Status: SHIPPED | OUTPATIENT
Start: 2020-10-22 | End: 2020-11-20

## 2020-10-22 NOTE — PROGRESS NOTES
Virtual Telephone Check-In    Shiloh Steiner verbally consents to a Virtual/Telephone Check-In visit on 10/22/2020.     Patient understands and accepts financial responsibility for any deductible, co-insurance and/or co-pays associated with this servic and mood, normal logic and thought process   Patient speaking in full sentences, no increased work of breathing    Assessment/Plan:   Diagnoses and all orders for this visit:    Therapeutic drug monitoring    Class 1 obesity without serious comorbidity wit as detailed in the plan of care above    Silvino Montgomery PA-C

## 2020-10-22 NOTE — PATIENT INSTRUCTIONS
Jamesetta Simmonds : [employer]  YouTube: [employer] 30 day shred  [employer] on [employer]  Nitza Aldridge on Demand  [employer]  [facility] Walk Away the Pounds  YouTube: Leelee Barrios. lora

## 2020-11-03 RX ORDER — TOPIRAMATE 25 MG/1
TABLET ORAL
Qty: 60 TABLET | Refills: 2 | OUTPATIENT
Start: 2020-11-03

## 2020-11-03 NOTE — TELEPHONE ENCOUNTER
Requesting topiramate 25 mg  LOV: 10/22/20  RTC: one month  Last Relevant Labs: na  Filled: 10/22/20 #30 with 0 refills    Future Appointments   Date Time Provider Lianet Ash   11/20/2020  7:40 AM Delmer Miller, MILLIE EMGWEI [facility] [address]

## 2020-11-04 ENCOUNTER — TELEPHONE (OUTPATIENT)
Dept: OTOLARYNGOLOGY | Facility: CLINIC | Age: 45
End: 2020-11-04

## 2020-11-04 NOTE — TELEPHONE ENCOUNTER
Per pt has procedure 11/13 and is asking what she should expect. Pt states she has a dentist appt the next day and asking if she should reschedule it.  Please advise

## 2020-11-05 NOTE — TELEPHONE ENCOUNTER
Pt has questions about procedure, including covid testing, avoiding anti-inflammatories, and a dentist appointment.  Please advise

## 2020-11-05 NOTE — TELEPHONE ENCOUNTER
Patient scheduled for septoplasy, bilateral submucous turbinate reduction, nasal valve repair on 11/13/20. Dr. Smith Brood, would you like to see her in the office to discuss what to expect on the day of surgery and recover afterwards? Or can you provide a summary? Can she have a dental exam the next day after surgery?

## 2020-11-06 NOTE — TELEPHONE ENCOUNTER
Dr Memo Knowles informed patient that she can follow up post op when she comes back but stated wanted to reschedule surgery and informed our  Jane Veliz.

## 2020-11-06 NOTE — TELEPHONE ENCOUNTER
Dr Evin Holland informed pt of note below,verbalized understanding but for post op follow up stated she will be out of town driving to [place] after surgery Saturday and be back on 11/25/20 but late night ,please advise for post op follow up.

## 2020-11-06 NOTE — TELEPHONE ENCOUNTER
Patient advised that visual recovery may be limited due to retinal condition. Patient may want to reschedule to 11/27/2020. Will follow up with her on 11/16 to confirm if will keep surgery date.

## 2020-11-18 ENCOUNTER — TELEPHONE (OUTPATIENT)
Dept: OTOLARYNGOLOGY | Facility: CLINIC | Age: 45
End: 2020-11-18

## 2020-11-19 NOTE — TELEPHONE ENCOUNTER
Patient returned call, patient will not be in [facility], out of town, but will have covid test at another location she found and does want to keep surgery date. Please return call at:[phone],thanks.

## 2020-11-19 NOTE — TELEPHONE ENCOUNTER
Patient contacted. Patient will upload covid test on [email or web address] also informed patient to bring it the day of her surgery as well.

## 2020-11-20 ENCOUNTER — TELEMEDICINE (OUTPATIENT)
Dept: INTERNAL MEDICINE CLINIC | Facility: CLINIC | Age: 45
End: 2020-11-20
Payer: COMMERCIAL

## 2020-11-20 VITALS — WEIGHT: 207 LBS | BODY MASS INDEX: 31 KG/M2

## 2020-11-20 DIAGNOSIS — E66.9 CLASS 1 OBESITY WITHOUT SERIOUS COMORBIDITY WITH BODY MASS INDEX (BMI) OF 31.0 TO 31.9 IN ADULT, UNSPECIFIED OBESITY TYPE: ICD-10-CM

## 2020-11-20 DIAGNOSIS — E53.8 VITAMIN B12 DEFICIENCY: ICD-10-CM

## 2020-11-20 DIAGNOSIS — Z51.81 THERAPEUTIC DRUG MONITORING: Primary | ICD-10-CM

## 2020-11-20 DIAGNOSIS — E55.9 VITAMIN D DEFICIENCY: ICD-10-CM

## 2020-11-20 PROCEDURE — 99213 OFFICE O/P EST LOW 20 MIN: CPT | Performed by: NURSE PRACTITIONER

## 2020-11-20 RX ORDER — TOPIRAMATE 25 MG/1
1 CAPSULE, EXTENDED RELEASE ORAL DAILY
Qty: 30 CAPSULE | Refills: 1 | Status: SHIPPED | OUTPATIENT
Start: 2020-11-20 | End: 2020-12-20

## 2020-11-20 NOTE — PATIENT INSTRUCTIONS
We are here to support you with weight loss, but please remember that you still need your primary care provider for your routine health maintenance.       PLAN:  Continue with phentermine and trokendi    Follow up with me in 1 month  Schedule follow up appo ** Daily INPUT> Look at nutrition section-- \"nutrients\" and it will break down your macros for the day (ie. Protein, carbs, fibers, sugars and fats). Try to stay within these numbers daily     2.  \"7 minute workout\" to help with exercise/a

## 2020-11-21 RX ORDER — CHLORAL HYDRATE 500 MG
1000 CAPSULE ORAL DAILY
COMMUNITY

## 2020-11-21 RX ORDER — GARLIC EXTRACT 500 MG
1 CAPSULE ORAL DAILY
COMMUNITY

## 2020-11-21 RX ORDER — FLUTICASONE PROPIONATE 50 MCG
1 SPRAY, SUSPENSION (ML) NASAL DAILY
COMMUNITY
End: 2020-12-04 | Stop reason: ALTCHOICE

## 2020-11-23 ENCOUNTER — TELEPHONE (OUTPATIENT)
Dept: OTOLARYNGOLOGY | Facility: CLINIC | Age: 45
End: 2020-11-23

## 2020-11-23 NOTE — TELEPHONE ENCOUNTER
Dr Inna Davis from the [facility] [address] called wanting to know if Wayne Jordan is needed for surgery. I looked at the orders and LOV notes and Arlyne Nikki was not mentioned as something discussed with the patient. Please advise. Thank you. Surgery is on 11/27/2020.

## 2020-11-25 ENCOUNTER — TELEPHONE (OUTPATIENT)
Dept: OTOLARYNGOLOGY | Facility: CLINIC | Age: 45
End: 2020-11-25

## 2020-11-25 NOTE — TELEPHONE ENCOUNTER
Lm for patient to call back. Checking status of covid results.  Patient will need to upload results by end of today

## 2020-11-25 NOTE — H&P
Muriel Argueta is a 39year old female. No chief complaint on file. HISTORY OF PRESENT ILLNESS   9/4/2020    The patient presents complaints of annoying itching for 4 years.   She feels that there is a tremendous amount of itching scaling crustin Not on file      Highest education level: Not on file    Tobacco Use      Smoking status: Never Smoker      Smokeless tobacco: Never Used    Substance and Sexual Activity      Alcohol use: No      Drug use: No      Sexual activity: Yes        Partners:  Mal Cardio Negative Chest pain, irregular heartbeat/palpitations and syncope. GI Negative Abdominal pain and diarrhea. Endocrine Negative Cold intolerance and heat intolerance. Neuro Negative Tremors. Psych Negative Anxiety and depression.    Integume Take 1 tablet by mouth daily. , Disp: , Rfl:   •  Oxybutynin Chloride ER 5 MG Oral Tablet 24 Hr, Take 1 tablet (5 mg total) by mouth daily. , Disp: 90 tablet, Rfl: 3  •  Estradiol (ESTRACE) 0.1 MG/GM Vaginal Cream, 1/2 gram twice weekly per vagina, Disp: 1 T deflection and especially her significant valve collapse is the other factor and her inability to breathe through her nose. She is a candidate for septal repair turbinate reduction and valve repair.  I discussed the planned surgery in detail with the patie

## 2020-11-27 ENCOUNTER — ANESTHESIA (OUTPATIENT)
Dept: SURGERY | Facility: HOSPITAL | Age: 45
End: 2020-11-27
Payer: COMMERCIAL

## 2020-11-27 ENCOUNTER — HOSPITAL ENCOUNTER (OUTPATIENT)
Facility: HOSPITAL | Age: 45
Setting detail: HOSPITAL OUTPATIENT SURGERY
Discharge: HOME | End: 2020-11-27
Attending: OTOLARYNGOLOGY | Admitting: OTOLARYNGOLOGY
Payer: COMMERCIAL

## 2020-11-27 ENCOUNTER — ANESTHESIA EVENT (OUTPATIENT)
Dept: SURGERY | Facility: HOSPITAL | Age: 45
End: 2020-11-27
Payer: COMMERCIAL

## 2020-11-27 VITALS
SYSTOLIC BLOOD PRESSURE: 134 MMHG | RESPIRATION RATE: 16 BRPM | OXYGEN SATURATION: 97 % | BODY MASS INDEX: 31.25 KG/M2 | DIASTOLIC BLOOD PRESSURE: 78 MMHG | WEIGHT: 211 LBS | HEIGHT: 69 IN | HEART RATE: 90 BPM | TEMPERATURE: 98 F

## 2020-11-27 DIAGNOSIS — Z01.818 PREOP TESTING: Primary | ICD-10-CM

## 2020-11-27 DIAGNOSIS — J34.2 DEVIATED NASAL SEPTUM: ICD-10-CM

## 2020-11-27 DIAGNOSIS — J34.89 NASAL VALVE COLLAPSE: ICD-10-CM

## 2020-11-27 DIAGNOSIS — J34.3 HYPERTROPHY OF NASAL TURBINATES: ICD-10-CM

## 2020-11-27 PROCEDURE — 30140 RESECT INFERIOR TURBINATE: CPT | Performed by: OTOLARYNGOLOGY

## 2020-11-27 PROCEDURE — 09UK0JZ SUPPLEMENT NASAL MUCOSA AND SOFT TISSUE WITH SYNTHETIC SUBSTITUTE, OPEN APPROACH: ICD-10-PCS | Performed by: OTOLARYNGOLOGY

## 2020-11-27 PROCEDURE — 30465 REPAIR NASAL STENOSIS: CPT | Performed by: OTOLARYNGOLOGY

## 2020-11-27 PROCEDURE — 095L0ZZ DESTRUCTION OF NASAL TURBINATE, OPEN APPROACH: ICD-10-PCS | Performed by: OTOLARYNGOLOGY

## 2020-11-27 PROCEDURE — 30520 REPAIR OF NASAL SEPTUM: CPT | Performed by: OTOLARYNGOLOGY

## 2020-11-27 PROCEDURE — 09SM0ZZ REPOSITION NASAL SEPTUM, OPEN APPROACH: ICD-10-PCS | Performed by: OTOLARYNGOLOGY

## 2020-11-27 PROCEDURE — 09SL7ZZ REPOSITION NASAL TURBINATE, VIA NATURAL OR ARTIFICIAL OPENING: ICD-10-PCS | Performed by: OTOLARYNGOLOGY

## 2020-11-27 RX ORDER — MORPHINE SULFATE 4 MG/ML
2 INJECTION, SOLUTION INTRAMUSCULAR; INTRAVENOUS EVERY 10 MIN PRN
Status: DISCONTINUED | OUTPATIENT
Start: 2020-11-27 | End: 2020-11-27

## 2020-11-27 RX ORDER — NALOXONE HYDROCHLORIDE 0.4 MG/ML
80 INJECTION, SOLUTION INTRAMUSCULAR; INTRAVENOUS; SUBCUTANEOUS AS NEEDED
Status: DISCONTINUED | OUTPATIENT
Start: 2020-11-27 | End: 2020-11-27

## 2020-11-27 RX ORDER — ROCURONIUM BROMIDE 10 MG/ML
INJECTION, SOLUTION INTRAVENOUS AS NEEDED
Status: DISCONTINUED | OUTPATIENT
Start: 2020-11-27 | End: 2020-11-27 | Stop reason: SURG

## 2020-11-27 RX ORDER — ACETAMINOPHEN 500 MG
1000 TABLET ORAL ONCE
Status: COMPLETED | OUTPATIENT
Start: 2020-11-27 | End: 2020-11-27

## 2020-11-27 RX ORDER — LIDOCAINE HYDROCHLORIDE 10 MG/ML
INJECTION, SOLUTION EPIDURAL; INFILTRATION; INTRACAUDAL; PERINEURAL AS NEEDED
Status: DISCONTINUED | OUTPATIENT
Start: 2020-11-27 | End: 2020-11-27 | Stop reason: SURG

## 2020-11-27 RX ORDER — DEXAMETHASONE 6 MG/1
TABLET ORAL
Qty: 4 TABLET | Refills: 0 | Status: SHIPPED | OUTPATIENT
Start: 2020-11-29 | End: 2021-01-26

## 2020-11-27 RX ORDER — FAMOTIDINE 20 MG/1
20 TABLET ORAL ONCE
Status: COMPLETED | OUTPATIENT
Start: 2020-11-27 | End: 2020-11-27

## 2020-11-27 RX ORDER — DEXAMETHASONE SODIUM PHOSPHATE 4 MG/ML
VIAL (ML) INJECTION AS NEEDED
Status: DISCONTINUED | OUTPATIENT
Start: 2020-11-27 | End: 2020-11-27 | Stop reason: SURG

## 2020-11-27 RX ORDER — MIDAZOLAM HYDROCHLORIDE 1 MG/ML
INJECTION INTRAMUSCULAR; INTRAVENOUS AS NEEDED
Status: DISCONTINUED | OUTPATIENT
Start: 2020-11-27 | End: 2020-11-27 | Stop reason: SURG

## 2020-11-27 RX ORDER — HYDROCODONE BITARTRATE AND ACETAMINOPHEN 5; 325 MG/1; MG/1
2 TABLET ORAL AS NEEDED
Status: DISCONTINUED | OUTPATIENT
Start: 2020-11-27 | End: 2020-11-27

## 2020-11-27 RX ORDER — SODIUM CHLORIDE, SODIUM LACTATE, POTASSIUM CHLORIDE, CALCIUM CHLORIDE 600; 310; 30; 20 MG/100ML; MG/100ML; MG/100ML; MG/100ML
INJECTION, SOLUTION INTRAVENOUS CONTINUOUS
Status: DISCONTINUED | OUTPATIENT
Start: 2020-11-27 | End: 2020-11-27

## 2020-11-27 RX ORDER — ONDANSETRON 2 MG/ML
4 INJECTION INTRAMUSCULAR; INTRAVENOUS ONCE AS NEEDED
Status: DISCONTINUED | OUTPATIENT
Start: 2020-11-27 | End: 2020-11-27

## 2020-11-27 RX ORDER — GLYCOPYRROLATE 0.2 MG/ML
INJECTION, SOLUTION INTRAMUSCULAR; INTRAVENOUS AS NEEDED
Status: DISCONTINUED | OUTPATIENT
Start: 2020-11-27 | End: 2020-11-27 | Stop reason: SURG

## 2020-11-27 RX ORDER — HYDROMORPHONE HYDROCHLORIDE 1 MG/ML
0.6 INJECTION, SOLUTION INTRAMUSCULAR; INTRAVENOUS; SUBCUTANEOUS EVERY 5 MIN PRN
Status: DISCONTINUED | OUTPATIENT
Start: 2020-11-27 | End: 2020-11-27

## 2020-11-27 RX ORDER — PROCHLORPERAZINE EDISYLATE 5 MG/ML
5 INJECTION INTRAMUSCULAR; INTRAVENOUS ONCE AS NEEDED
Status: DISCONTINUED | OUTPATIENT
Start: 2020-11-27 | End: 2020-11-27

## 2020-11-27 RX ORDER — HYDROMORPHONE HYDROCHLORIDE 1 MG/ML
0.2 INJECTION, SOLUTION INTRAMUSCULAR; INTRAVENOUS; SUBCUTANEOUS EVERY 5 MIN PRN
Status: DISCONTINUED | OUTPATIENT
Start: 2020-11-27 | End: 2020-11-27

## 2020-11-27 RX ORDER — MORPHINE SULFATE 4 MG/ML
4 INJECTION, SOLUTION INTRAMUSCULAR; INTRAVENOUS EVERY 10 MIN PRN
Status: DISCONTINUED | OUTPATIENT
Start: 2020-11-27 | End: 2020-11-27

## 2020-11-27 RX ORDER — LIDOCAINE HYDROCHLORIDE AND EPINEPHRINE 10; 10 MG/ML; UG/ML
INJECTION, SOLUTION INFILTRATION; PERINEURAL AS NEEDED
Status: DISCONTINUED | OUTPATIENT
Start: 2020-11-27 | End: 2020-11-27 | Stop reason: HOSPADM

## 2020-11-27 RX ORDER — AMOXICILLIN 500 MG/1
500 CAPSULE ORAL 3 TIMES DAILY
Qty: 21 CAPSULE | Refills: 0 | Status: SHIPPED | OUTPATIENT
Start: 2020-11-27 | End: 2020-12-04

## 2020-11-27 RX ORDER — DIAPER,BRIEF,INFANT-TODD,DISP
EACH MISCELLANEOUS AS NEEDED
Status: DISCONTINUED | OUTPATIENT
Start: 2020-11-27 | End: 2020-11-27 | Stop reason: HOSPADM

## 2020-11-27 RX ORDER — HYDROCODONE BITARTRATE AND ACETAMINOPHEN 7.5; 325 MG/1; MG/1
1-2 TABLET ORAL EVERY 4 HOURS PRN
Qty: 20 TABLET | Refills: 0 | Status: SHIPPED | OUTPATIENT
Start: 2020-11-27 | End: 2021-01-26

## 2020-11-27 RX ORDER — ONDANSETRON 2 MG/ML
INJECTION INTRAMUSCULAR; INTRAVENOUS AS NEEDED
Status: DISCONTINUED | OUTPATIENT
Start: 2020-11-27 | End: 2020-11-27 | Stop reason: SURG

## 2020-11-27 RX ORDER — HALOPERIDOL 5 MG/ML
0.25 INJECTION INTRAMUSCULAR ONCE AS NEEDED
Status: DISCONTINUED | OUTPATIENT
Start: 2020-11-27 | End: 2020-11-27

## 2020-11-27 RX ORDER — HYDROMORPHONE HYDROCHLORIDE 1 MG/ML
0.4 INJECTION, SOLUTION INTRAMUSCULAR; INTRAVENOUS; SUBCUTANEOUS EVERY 5 MIN PRN
Status: DISCONTINUED | OUTPATIENT
Start: 2020-11-27 | End: 2020-11-27

## 2020-11-27 RX ORDER — METOCLOPRAMIDE 10 MG/1
10 TABLET ORAL ONCE
Status: COMPLETED | OUTPATIENT
Start: 2020-11-27 | End: 2020-11-27

## 2020-11-27 RX ORDER — HYDROCODONE BITARTRATE AND ACETAMINOPHEN 5; 325 MG/1; MG/1
1 TABLET ORAL AS NEEDED
Status: DISCONTINUED | OUTPATIENT
Start: 2020-11-27 | End: 2020-11-27

## 2020-11-27 RX ORDER — MORPHINE SULFATE 10 MG/ML
6 INJECTION, SOLUTION INTRAMUSCULAR; INTRAVENOUS EVERY 10 MIN PRN
Status: DISCONTINUED | OUTPATIENT
Start: 2020-11-27 | End: 2020-11-27

## 2020-11-27 RX ADMIN — DEXAMETHASONE SODIUM PHOSPHATE 8 MG: 4 MG/ML VIAL (ML) INJECTION at 09:58:00

## 2020-11-27 RX ADMIN — MIDAZOLAM HYDROCHLORIDE 2 MG: 1 INJECTION INTRAMUSCULAR; INTRAVENOUS at 09:53:00

## 2020-11-27 RX ADMIN — ONDANSETRON 4 MG: 2 INJECTION INTRAMUSCULAR; INTRAVENOUS at 09:59:00

## 2020-11-27 RX ADMIN — ROCURONIUM BROMIDE 5 MG: 10 INJECTION, SOLUTION INTRAVENOUS at 09:54:00

## 2020-11-27 RX ADMIN — GLYCOPYRROLATE 0.2 MG: 0.2 INJECTION, SOLUTION INTRAMUSCULAR; INTRAVENOUS at 09:53:00

## 2020-11-27 RX ADMIN — SODIUM CHLORIDE, SODIUM LACTATE, POTASSIUM CHLORIDE, CALCIUM CHLORIDE: 600; 310; 30; 20 INJECTION, SOLUTION INTRAVENOUS at 10:39:00

## 2020-11-27 RX ADMIN — SODIUM CHLORIDE, SODIUM LACTATE, POTASSIUM CHLORIDE, CALCIUM CHLORIDE: 600; 310; 30; 20 INJECTION, SOLUTION INTRAVENOUS at 09:50:00

## 2020-11-27 RX ADMIN — LIDOCAINE HYDROCHLORIDE 40 MG: 10 INJECTION, SOLUTION EPIDURAL; INFILTRATION; INTRACAUDAL; PERINEURAL at 09:53:00

## 2020-11-27 NOTE — INTERVAL H&P NOTE
Pre-op Diagnosis: Deviated nasal septum [J34.2]  Hypertrophy of nasal turbinates [J34.3]  Nasal valve collapse [J34.89]    The above referenced H&P was reviewed by Antwan Chow MD on 11/27/2020, the patient was examined and no significant changes have occ

## 2020-11-27 NOTE — ANESTHESIA PREPROCEDURE EVALUATION
Anesthesia PreOp Note    HPI:     Shiloh Steiner is a 39year old female who presents for preoperative consultation requested by: Glo Cruz MD    Date of Surgery: 11/27/2020    Procedure(s):  NASAL SEPTOPLASTY BILAT SINUS ENDOSCOPY ETHM MAX  Mirlande \"wagners vasculitis\"   • Visual impairment     glasses and contacts       Past Surgical History:   Procedure Laterality Date   • BLADDER TRANSVAGINAL TAPING SUSPENSION URETHROLYSIS N/A 2/26/2015    Performed by Brisa Sadler DO at [facility] MAIN OR   • COLO total) by mouth every morning before breakfast., Disp: 30 tablet, Rfl: 1, 11/19/2020    •  Fexofenadine-Pseudoephed -240 MG Oral Tablet 24 Hr, TAKE 1 TABLET BY MOUTH EVERY DAY AS NEEDED, Disp: 30 tablet, Rfl: 1, Unknown at Unknown time    •  Meloxica file    Social Needs      Financial resource strain: Not on file      Food insecurity        Worry: Not on file        Inability: Not on file      Transportation needs        Medical: Not on file        Non-medical: Not on file    Tobacco Use      Smoking BP:  132/71   Pulse:  89   Resp:  18   SpO2:  98%   Weight: 95.3 kg (210 lb) 95.7 kg (211 lb)   Height: 1.753 m (5' 9\")         Anesthesia Evaluation     Patient summary reviewed and Nursing notes reviewed    History of anesthetic complications   Airway

## 2020-11-27 NOTE — ANESTHESIA PROCEDURE NOTES
Airway  Date/Time: 11/27/2020 9:54 AM  Urgency: elective    Airway not difficult    General Information and Staff    Patient location during procedure: OR  Anesthesiologist: Celestine Harrison MD  Resident/CRNA: Boubacar Floyd CRNA  Performed: MERYL

## 2020-11-27 NOTE — ANESTHESIA POSTPROCEDURE EVALUATION
Patient: Em Chopra    Procedure Summary     Date: 11/27/20 Room / Location: [address] [place] [address] MAIN OR 02 / [address] [place] [address] MAIN OR    Anesthesia Start: 9269 Anesthesia Stop: 6775    Procedure: NASAL SEPTOPLASTY BILAT SINUS ENDOSCOPY ETHM MAX (Bilateral Nose) Diagnosis:

## 2020-11-27 NOTE — OPERATIVE REPORT
Nehemias Pisano    DATE OF SURGERY:  11/27/2020  PREOPERATIVE DIAGNOSIS:   Chronic nasal obstruction from septal deviation turbinate hypertrophy and   nasal valve collapse   POSTOPERATIVE DIAGNOSIS: Same.   OPERATIVE PROCEDURE:    Septal repair and lucy elevated. The posterior bony septum was addressed and bony deviation was removed with Pato-Stevo rongeur. Care was taken to preserve as much bone superiorly. The inferior portion of the quadrangle cartilage was then addressed.   Mucosa was preserved closure was necessary. There is no bleeding at the insertion site and closure of the wound was deemed unnecessary. The exact same procedure was performed on the contralateral side of the patient. Small Telfa pledgets were placed temporarily.   The gastric

## 2020-11-28 ENCOUNTER — TELEPHONE (OUTPATIENT)
Dept: OTOLARYNGOLOGY | Facility: CLINIC | Age: 45
End: 2020-11-28

## 2020-11-28 NOTE — TELEPHONE ENCOUNTER
Pt is post op day 1  septoplasty, SMR,nasal  Valve repair. Per  Pt pt is doing well no bleeding, advised pt no bending or heavy lifting for the next week and pt is not to blow nose until seen by Dr Andres Williamson.  Advised pt she can start using OTC saline nasal sp

## 2020-12-04 ENCOUNTER — OFFICE VISIT (OUTPATIENT)
Dept: FAMILY MEDICINE CLINIC | Facility: CLINIC | Age: 45
End: 2020-12-04
Payer: COMMERCIAL

## 2020-12-04 ENCOUNTER — OFFICE VISIT (OUTPATIENT)
Dept: OTOLARYNGOLOGY | Facility: CLINIC | Age: 45
End: 2020-12-04
Payer: COMMERCIAL

## 2020-12-04 VITALS
SYSTOLIC BLOOD PRESSURE: 115 MMHG | TEMPERATURE: 98 F | WEIGHT: 207 LBS | BODY MASS INDEX: 30.66 KG/M2 | HEIGHT: 69 IN | DIASTOLIC BLOOD PRESSURE: 65 MMHG

## 2020-12-04 VITALS
SYSTOLIC BLOOD PRESSURE: 110 MMHG | BODY MASS INDEX: 30.66 KG/M2 | WEIGHT: 207 LBS | RESPIRATION RATE: 12 BRPM | HEIGHT: 69 IN | DIASTOLIC BLOOD PRESSURE: 74 MMHG | HEART RATE: 68 BPM

## 2020-12-04 DIAGNOSIS — Z12.11 SCREENING FOR COLORECTAL CANCER: ICD-10-CM

## 2020-12-04 DIAGNOSIS — E53.8 VITAMIN B12 DEFICIENCY: ICD-10-CM

## 2020-12-04 DIAGNOSIS — Z00.00 ROUTINE GENERAL MEDICAL EXAMINATION AT A HEALTH CARE FACILITY: Primary | ICD-10-CM

## 2020-12-04 DIAGNOSIS — Z12.12 SCREENING FOR COLORECTAL CANCER: ICD-10-CM

## 2020-12-04 DIAGNOSIS — E55.9 VITAMIN D DEFICIENCY: ICD-10-CM

## 2020-12-04 DIAGNOSIS — Z00.00 BLOOD TESTS FOR ROUTINE GENERAL PHYSICAL EXAMINATION: ICD-10-CM

## 2020-12-04 DIAGNOSIS — Z12.31 ENCOUNTER FOR SCREENING MAMMOGRAM FOR MALIGNANT NEOPLASM OF BREAST: ICD-10-CM

## 2020-12-04 DIAGNOSIS — J34.89 NASAL OBSTRUCTION: Primary | ICD-10-CM

## 2020-12-04 DIAGNOSIS — K90.0 CELIAC DISEASE: ICD-10-CM

## 2020-12-04 PROCEDURE — 3078F DIAST BP <80 MM HG: CPT | Performed by: OTOLARYNGOLOGY

## 2020-12-04 PROCEDURE — 3074F SYST BP LT 130 MM HG: CPT | Performed by: OTOLARYNGOLOGY

## 2020-12-04 PROCEDURE — 3074F SYST BP LT 130 MM HG: CPT | Performed by: FAMILY MEDICINE

## 2020-12-04 PROCEDURE — 99396 PREV VISIT EST AGE 40-64: CPT | Performed by: FAMILY MEDICINE

## 2020-12-04 PROCEDURE — 99024 POSTOP FOLLOW-UP VISIT: CPT | Performed by: OTOLARYNGOLOGY

## 2020-12-04 PROCEDURE — 3008F BODY MASS INDEX DOCD: CPT | Performed by: FAMILY MEDICINE

## 2020-12-04 PROCEDURE — 3078F DIAST BP <80 MM HG: CPT | Performed by: FAMILY MEDICINE

## 2020-12-04 PROCEDURE — 3008F BODY MASS INDEX DOCD: CPT | Performed by: OTOLARYNGOLOGY

## 2020-12-04 NOTE — PROGRESS NOTES
CHIEF COMPLAINT   Complete physical  HPI:   Gopi Richardson is a 39year old female who presents for a complete physical exam.   Dr. Duron Nipple - gyne and breast exam.  Saw 6/29/20 for breast and pelvic. Dr. Radha Rivera - pulmonary.  Sees yearly  Dr. Garry Hurley every 4 (four) hours as needed for Pain. 20 tablet 0   • amoxicillin 500 MG Oral Cap Take 1 capsule (500 mg total) by mouth 3 (three) times daily for 7 days.  21 capsule 0   • dexamethasone 6 MG Oral Tab 1 po 11/29, 12/1, 12/3, 12/5 4 tablet 0   • ZINC OR U induced   • Celiac disease    • Gallstones    • H/O pleurisy 1/24/15    treated with course of steroids-followed by Dr Chinita Nyhan aware.    • Vasculitis ([facility] [address]     \"wagners vasculitis\"   • Visual impairment     glasses and contacts of headaches  PSYCHE: no complaint ofdepression or anxiety  HEMATOLOGIC: denies hx of anemia    EXAM:   /74   Pulse 68   Resp 12   Ht 5' 9\" (1.753 m)   Wt 207 lb (93.9 kg)   LMP 01/01/2020 (Approximate)   BMI 30.57 kg/m²   Body mass index is 30.57 k

## 2020-12-04 NOTE — PROGRESS NOTES
Duane Chiang is a 39year old female. Patient presents with:  Post-Op: s/p septoplasty/smr done on 11/27/20      HISTORY OF PRESENT ILLNESS   9/4/2020    The patient presents complaints of annoying itching for 4 years.   She feels that there is a tr dry in spite of using saline twice a day and also using A&E ointment in the nose and she states she is eyes had an issue with nasal dryness.   Social History    Socioeconomic History      Marital status:       Spouse name: Not on file      Number of Performed by Rossy Dixon MD at [address]   • OTHER SURGICAL HISTORY  2/2015    Bladder Mesh   • REPAIR OF NASAL SEPTUM  11/27/2020   • TONSILLECTOMY Bilateral 2/13/2018    Performed by Naresh Funez MD at [address]   • UPPER GI ENDOSCOPY,EXAM valve collapse        Current Outpatient Medications:   •  HYDROcodone-acetaminophen 7.5-325 MG Oral Tab, Take 1-2 tablets by mouth every 4 (four) hours as needed for Pain., Disp: 20 tablet, Rfl: 0  •  amoxicillin 500 MG Oral Cap, Take 1 capsule (500 mg to Rfl:   ASSESSMENT AND PLAN         1. 1 week post septal repair and nasal valve repair with Latera  Healing really well some nasal congestion very dry and crusty so I do want her to run a humidifier in her bedroom use saline 3-4 times a day start Flonase a

## 2020-12-18 ENCOUNTER — VIRTUAL PHONE E/M (OUTPATIENT)
Dept: INTERNAL MEDICINE CLINIC | Facility: CLINIC | Age: 45
End: 2020-12-18
Payer: COMMERCIAL

## 2020-12-18 DIAGNOSIS — Z51.81 THERAPEUTIC DRUG MONITORING: Primary | ICD-10-CM

## 2020-12-18 DIAGNOSIS — E66.9 CLASS 1 OBESITY WITHOUT SERIOUS COMORBIDITY WITH BODY MASS INDEX (BMI) OF 31.0 TO 31.9 IN ADULT, UNSPECIFIED OBESITY TYPE: ICD-10-CM

## 2020-12-18 DIAGNOSIS — F43.9 STRESS: ICD-10-CM

## 2020-12-18 DIAGNOSIS — E55.9 VITAMIN D DEFICIENCY: ICD-10-CM

## 2020-12-18 DIAGNOSIS — E53.8 VITAMIN B12 DEFICIENCY: ICD-10-CM

## 2020-12-18 NOTE — PROGRESS NOTES
Called patient for virtual call, she answered but had work call that she needed to complete. She said to call back at 1200, which this provider did- but no answer.  Ieft message to call back and reschedule No recent flairs. Check uric acid level. Has been on allopurinol in the past. Initial presentation was in 2003.

## 2020-12-22 ENCOUNTER — LAB ENCOUNTER (OUTPATIENT)
Dept: LAB | Age: 45
End: 2020-12-22
Attending: FAMILY MEDICINE
Payer: COMMERCIAL

## 2020-12-22 DIAGNOSIS — Z12.12 SCREENING FOR COLORECTAL CANCER: ICD-10-CM

## 2020-12-22 DIAGNOSIS — E55.9 VITAMIN D DEFICIENCY: ICD-10-CM

## 2020-12-22 DIAGNOSIS — R74.8 ELEVATED ALKALINE PHOSPHATASE LEVEL: Primary | ICD-10-CM

## 2020-12-22 DIAGNOSIS — Z00.00 ROUTINE GENERAL MEDICAL EXAMINATION AT A HEALTH CARE FACILITY: ICD-10-CM

## 2020-12-22 DIAGNOSIS — Z12.11 SCREENING FOR COLORECTAL CANCER: ICD-10-CM

## 2020-12-22 DIAGNOSIS — E53.8 VITAMIN B12 DEFICIENCY: ICD-10-CM

## 2020-12-22 DIAGNOSIS — K90.0 CELIAC DISEASE: ICD-10-CM

## 2020-12-22 DIAGNOSIS — R74.8 ELEVATED ALKALINE PHOSPHATASE LEVEL: ICD-10-CM

## 2020-12-27 PROCEDURE — 82274 ASSAY TEST FOR BLOOD FECAL: CPT | Performed by: FAMILY MEDICINE

## 2020-12-28 ENCOUNTER — OFFICE VISIT (OUTPATIENT)
Dept: OTOLARYNGOLOGY | Facility: CLINIC | Age: 45
End: 2020-12-28
Payer: COMMERCIAL

## 2020-12-28 VITALS
BODY MASS INDEX: 31 KG/M2 | HEART RATE: 106 BPM | DIASTOLIC BLOOD PRESSURE: 80 MMHG | WEIGHT: 207 LBS | SYSTOLIC BLOOD PRESSURE: 115 MMHG | TEMPERATURE: 98 F

## 2020-12-28 DIAGNOSIS — H60.543 DERMATITIS OF EAR CANAL, BILATERAL: Primary | ICD-10-CM

## 2020-12-28 DIAGNOSIS — R04.0 EPISTAXIS: ICD-10-CM

## 2020-12-28 PROCEDURE — 30901 CONTROL OF NOSEBLEED: CPT | Performed by: OTOLARYNGOLOGY

## 2020-12-28 PROCEDURE — 3079F DIAST BP 80-89 MM HG: CPT | Performed by: OTOLARYNGOLOGY

## 2020-12-28 PROCEDURE — 99213 OFFICE O/P EST LOW 20 MIN: CPT | Performed by: OTOLARYNGOLOGY

## 2020-12-28 PROCEDURE — 3074F SYST BP LT 130 MM HG: CPT | Performed by: OTOLARYNGOLOGY

## 2020-12-28 RX ORDER — FLUOCINOLONE ACETONIDE 0.11 MG/ML
3 OIL AURICULAR (OTIC) 2 TIMES DAILY
Qty: 1 BOTTLE | Refills: 1 | Status: SHIPPED | OUTPATIENT
Start: 2020-12-28 | End: 2021-01-11

## 2020-12-28 NOTE — PROGRESS NOTES
Gopi Richardson is a 39year old female. Patient presents with: Follow - Up: 3 week follow up. c/o dryness in both nostrils. Pt. feels she is breathing better. Currently on flonase but would like to eventually stop it.    Ear Problem: Patient has star she has very dry mouth because she has complete mouth breathing when she sleeping.   12/4/2020  1 week post septal repair and nasal valve repair with Tanya Qiu Already feels like she can breathe better very dry in spite of using saline twice a day and also usi Procedure Laterality Date   • BLADDER TRANSVAGINAL TAPING SUSPENSION URETHROLYSIS N/A 2/26/2015    Performed by Jose L Segundo DO at [facility] MAIN OR   • COLONOSCOPY      2014   • DILATION/CURETTAGE,DIAGNOSTIC     • EXCISION TURBINATE,SUBMUCOUS  11/27/2020 EOMI   Neurological Normal Memory - Normal. Cranial nerves - Cranial nerves II through XII grossly intact.  Gait is normal   Head/Face Normal Facial features - Normal. Eyebrows - Normal. Skull - Normal.             Ears Normal Inspection - Right: Normal, Le tablet, Rfl: 2  •  ibuprofen 600 MG Oral Tab, Take 600 mg by mouth every 6 (six) hours as needed for Pain., Disp: , Rfl:   •  acetaminophen 325 MG Oral Tab, Take 325 mg by mouth every 6 (six) hours as needed for Pain., Disp: , Rfl:   •  Multiple Vitamins-M

## 2020-12-29 ENCOUNTER — TELEMEDICINE (OUTPATIENT)
Dept: INTERNAL MEDICINE CLINIC | Facility: CLINIC | Age: 45
End: 2020-12-29
Payer: COMMERCIAL

## 2020-12-29 VITALS — WEIGHT: 208 LBS | BODY MASS INDEX: 31 KG/M2

## 2020-12-29 DIAGNOSIS — F43.9 STRESS: ICD-10-CM

## 2020-12-29 DIAGNOSIS — Z51.81 THERAPEUTIC DRUG MONITORING: Primary | ICD-10-CM

## 2020-12-29 DIAGNOSIS — E55.9 VITAMIN D DEFICIENCY: ICD-10-CM

## 2020-12-29 DIAGNOSIS — E53.8 VITAMIN B12 DEFICIENCY: ICD-10-CM

## 2020-12-29 DIAGNOSIS — E66.9 CLASS 1 OBESITY WITHOUT SERIOUS COMORBIDITY WITH BODY MASS INDEX (BMI) OF 31.0 TO 31.9 IN ADULT, UNSPECIFIED OBESITY TYPE: ICD-10-CM

## 2020-12-29 DIAGNOSIS — Z72.3 LACK OF EXERCISE: ICD-10-CM

## 2020-12-29 PROCEDURE — 99213 OFFICE O/P EST LOW 20 MIN: CPT | Performed by: NURSE PRACTITIONER

## 2020-12-29 RX ORDER — PHENTERMINE HYDROCHLORIDE 37.5 MG/1
37.5 TABLET ORAL
Qty: 30 TABLET | Refills: 0 | Status: SHIPPED | OUTPATIENT
Start: 2020-12-29 | End: 2021-07-06

## 2020-12-29 NOTE — PATIENT INSTRUCTIONS
We are here to support you with weight loss, but please remember that you still need your primary care provider for your routine health maintenance.       PLAN:  Will continue with phentermine and restart trokendi   Follow up with me in 1 month  Schedule fo ** Daily INPUT> Look at nutrition section-- \"nutrients\" and it will break down your macros for the day (ie. Protein, carbs, fibers, sugars and fats). Try to stay within these numbers daily     2.  \"7 minute workout\" to help with exercise/a

## 2020-12-29 NOTE — PROGRESS NOTES
[facility] WEIGHT MANAGEMENT VIRTUAL ENCOUNTER     Wyatt Palma verbally consents to a Virtual/Telephone Check-In service on 12/29/20   Patient understands and accepts financial responsibility for any deductible, co-insurance and/or co-pays ass sentences comfortably   SKIN: warm, pink, dry without rashes to exposed area   EYES: conjunctiva pink  HEENT: atraumatic, normocephalic  LUNGS: normal work of breathing, non labored  CARDIO: normal work, no exertion  EXTREMITIES: no cyanosis, no clubbing, Disp: 20 tablet, Rfl: 0    •  dexamethasone 6 MG Oral Tab, 1 po 11/29, 12/1, 12/3, 12/5 (Patient not taking: Reported on 12/28/2020 ), Disp: 4 tablet, Rfl: 0    •  ZINC OR, Use as directed 1 tablet in the mouth or throat daily. , Disp: , Rfl:     •  Acidoph unspecified obesity type    Vitamin D deficiency    Vitamin B12 deficiency    Stress      Next appt needs to be in person (since last office appt was in 2/2020)    PLAN  · Continue with medications: phentermine (will do for another month and change since h restrictions of visitation. There are limitations of this visit as no physical exam could be performed. Every conscious effort was taken to allow for sufficient and adequate time.   This billing was spent on reviewing labs, medications, radiology tests an

## 2020-12-30 ENCOUNTER — PATIENT MESSAGE (OUTPATIENT)
Dept: OTOLARYNGOLOGY | Facility: CLINIC | Age: 45
End: 2020-12-30

## 2020-12-30 RX ORDER — NEOMYCIN SULFATE, POLYMYXIN B SULFATE AND HYDROCORTISONE 10; 3.5; 1 MG/ML; MG/ML; [USP'U]/ML
3 SUSPENSION/ DROPS AURICULAR (OTIC) 2 TIMES DAILY
Qty: 1 BOTTLE | Refills: 2 | Status: SHIPPED | OUTPATIENT
Start: 2020-12-30 | End: 2021-01-13

## 2020-12-30 NOTE — TELEPHONE ENCOUNTER
From: Nancie Hale  To: Carin Carias MD  Sent: 12/30/2020 7:26 AM CST  Subject: Prescription Question    Hi Dr. Lizeth Peña,    I picked up the ear drops at the pharmacy after our appointment, but they were not the drops I was looking to get.  I was requ

## 2020-12-31 ENCOUNTER — TELEMEDICINE (OUTPATIENT)
Dept: FAMILY MEDICINE CLINIC | Facility: CLINIC | Age: 45
End: 2020-12-31
Payer: COMMERCIAL

## 2020-12-31 ENCOUNTER — HOSPITAL ENCOUNTER (OUTPATIENT)
Age: 45
Discharge: HOME | End: 2020-12-31
Payer: COMMERCIAL

## 2020-12-31 VITALS
OXYGEN SATURATION: 98 % | HEART RATE: 101 BPM | TEMPERATURE: 98 F | RESPIRATION RATE: 18 BRPM | DIASTOLIC BLOOD PRESSURE: 96 MMHG | SYSTOLIC BLOOD PRESSURE: 123 MMHG

## 2020-12-31 DIAGNOSIS — Z20.822 EXPOSURE TO COVID-19 VIRUS: Primary | ICD-10-CM

## 2020-12-31 DIAGNOSIS — R79.89 ELEVATED CORTISOL LEVEL: Primary | ICD-10-CM

## 2020-12-31 DIAGNOSIS — R79.89 ELEVATED PLATELET COUNT: ICD-10-CM

## 2020-12-31 PROCEDURE — 3074F SYST BP LT 130 MM HG: CPT | Performed by: NURSE PRACTITIONER

## 2020-12-31 PROCEDURE — 3080F DIAST BP >= 90 MM HG: CPT | Performed by: NURSE PRACTITIONER

## 2020-12-31 PROCEDURE — 99214 OFFICE O/P EST MOD 30 MIN: CPT | Performed by: FAMILY MEDICINE

## 2020-12-31 PROCEDURE — 99213 OFFICE O/P EST LOW 20 MIN: CPT | Performed by: NURSE PRACTITIONER

## 2020-12-31 NOTE — PROGRESS NOTES
Lizzette Webber is a 39year old female. CHIEF COMPLAINT   Follow up on blood work  HPI:   This visit is conducted using Telemedicine with live, interactive video and audio.  Patient understands and accepts financial responsibility for any deductible, TAKE 1 TABLET DAILY AS DIRECTED 91 tablet 0   • Fexofenadine-Pseudoephed -240 MG Oral Tablet 24 Hr TAKE 1 TABLET BY MOUTH EVERY DAY AS NEEDED (Patient not taking: Reported on 12/28/2020 ) 30 tablet 1   • Meloxicam 15 MG Oral Tab Take 1 tablet (15 mg

## 2020-12-31 NOTE — ED PROVIDER NOTES
Patient Seen in: [facility] [address] [facility]      History   No chief complaint on file.     Stated Complaint: recent exp     HPI  Patient is 51-year-old female past medical history of celiac disease, asthma, Palmer's vasculitis presents with Covid exp for stated complaint: recent exp   Other systems are as noted in HPI. Constitutional and vital signs reviewed. All other systems reviewed and negative except as noted above.     Physical Exam     ED Triage Vitals   BP 12/31/20 1343 (!) 123/96   Pulse diagnosis)    Disposition:  Discharge  12/31/2020  1:53 pm    Follow-up:  Yang Palafox MD  [address] [place] [address] [address] [place] [address]  [phone]      As needed          Medications Prescribed:  Current Discharge Medication List

## 2021-01-02 LAB — SARS-COV-2 RNA,QUAL, RT-PCR: DETECTED

## 2021-01-05 ENCOUNTER — MED REC SCAN ONLY (OUTPATIENT)
Dept: FAMILY MEDICINE CLINIC | Facility: CLINIC | Age: 46
End: 2021-01-05

## 2021-01-26 ENCOUNTER — OFFICE VISIT (OUTPATIENT)
Dept: INTERNAL MEDICINE CLINIC | Facility: CLINIC | Age: 46
End: 2021-01-26
Payer: COMMERCIAL

## 2021-01-26 ENCOUNTER — TELEPHONE (OUTPATIENT)
Dept: INTERNAL MEDICINE CLINIC | Facility: CLINIC | Age: 46
End: 2021-01-26

## 2021-01-26 VITALS
HEIGHT: 69 IN | SYSTOLIC BLOOD PRESSURE: 118 MMHG | RESPIRATION RATE: 16 BRPM | DIASTOLIC BLOOD PRESSURE: 76 MMHG | HEART RATE: 90 BPM | WEIGHT: 209 LBS | BODY MASS INDEX: 30.96 KG/M2

## 2021-01-26 DIAGNOSIS — Z51.81 THERAPEUTIC DRUG MONITORING: Primary | ICD-10-CM

## 2021-01-26 DIAGNOSIS — Z72.3 LACK OF EXERCISE: ICD-10-CM

## 2021-01-26 DIAGNOSIS — E53.8 VITAMIN B12 DEFICIENCY: ICD-10-CM

## 2021-01-26 DIAGNOSIS — E66.9 OBESITY (BMI 30.0-34.9): ICD-10-CM

## 2021-01-26 DIAGNOSIS — F43.9 STRESS: ICD-10-CM

## 2021-01-26 PROCEDURE — 3008F BODY MASS INDEX DOCD: CPT | Performed by: NURSE PRACTITIONER

## 2021-01-26 PROCEDURE — 99213 OFFICE O/P EST LOW 20 MIN: CPT | Performed by: NURSE PRACTITIONER

## 2021-01-26 PROCEDURE — 3074F SYST BP LT 130 MM HG: CPT | Performed by: NURSE PRACTITIONER

## 2021-01-26 PROCEDURE — 3078F DIAST BP <80 MM HG: CPT | Performed by: NURSE PRACTITIONER

## 2021-01-26 RX ORDER — PHENTERMINE HYDROCHLORIDE 37.5 MG/1
37.5 TABLET ORAL
Qty: 30 TABLET | Refills: 0 | Status: CANCELLED | OUTPATIENT
Start: 2021-01-26

## 2021-01-26 RX ORDER — NALTREXONE HYDROCHLORIDE AND BUPROPION HYDROCHLORIDE 8; 90 MG/1; MG/1
TABLET, EXTENDED RELEASE ORAL
Qty: 120 TABLET | Refills: 0 | Status: SHIPPED | OUTPATIENT
Start: 2021-01-26 | End: 2021-02-21

## 2021-01-26 RX ORDER — NALTREXONE HYDROCHLORIDE AND BUPROPION HYDROCHLORIDE 8; 90 MG/1; MG/1
TABLET, EXTENDED RELEASE ORAL
COMMUNITY
End: 2021-07-06

## 2021-01-26 NOTE — PATIENT INSTRUCTIONS
We are here to support you with weight loss, but please remember that you still need your primary care provider for your routine health maintenance.       PLAN:  Stop phentermine  Will trial contrave 8-90mg   Week 1: 1 tablet in AM. Week 2: 1 tablet in AM a Lose 1.5-2 lbs per week                Activity level: not very active (can't count exercise towards calorie number per day)                   ** Daily INPUT> Look at nutrition section-- \"nutrients\" and it will break down your macros for th

## 2021-01-26 NOTE — PROGRESS NOTES
HISTORY OF PRESENT ILLNESS  Patient presents with:  Weight Check: up 1 lb    Kwasi Sam is a 39year old female here for follow up with medical weight loss program for the treatment of overweight, obesity, or morbid obesity.      Up #1 lbs  Complia labored  CARDIO: RRR without murmur  GI: +BS, NT/ND, no masses or HSM  EXTREMITIES: no cyanosis, no clubbing, no edema  PSYCH: pleasant, cooperative, normal mood and affect    Lab Results   Component Value Date    GLU 88 12/22/2020    BUN 8 12/22/2020    B Rfl: 3    •  Estradiol (ESTRACE) 0.1 MG/GM Vaginal Cream, 1/2 gram twice weekly per vagina, Disp: 1 Tube, Rfl: 3    •  CAMRESE LO 0.1-0.02 & 0.01 MG Oral Tab, TAKE 1 TABLET DAILY AS DIRECTED, Disp: 91 tablet, Rfl: 0    •  Fexofenadine-Pseudoephed -24 eat breakfast daily/ regular protein intake  · Follow up with dietitian and psychologist as recommended. · Discussed the role of sleep and stress in weight management.   · Counseled on comprehensive weight loss plan including attention to nutrition, exerci

## 2021-01-26 NOTE — TELEPHONE ENCOUNTER
Pa request in Epic for contrave completed and approved. My chart sent to patient. Prior authorization approved Case ID: [id number]      Payer:  Alex Edward 19    [phone]     [phone]    EPUIXM:[id number];MKNFPC:[id number]; Review Type:

## 2021-02-20 DIAGNOSIS — E66.9 OBESITY (BMI 30.0-34.9): ICD-10-CM

## 2021-02-20 DIAGNOSIS — Z51.81 THERAPEUTIC DRUG MONITORING: ICD-10-CM

## 2021-02-21 RX ORDER — NALTREXONE HYDROCHLORIDE AND BUPROPION HYDROCHLORIDE 8; 90 MG/1; MG/1
TABLET, EXTENDED RELEASE ORAL
Qty: 120 TABLET | Refills: 0 | Status: SHIPPED | OUTPATIENT
Start: 2021-02-21 | End: 2021-07-06

## 2021-02-21 NOTE — TELEPHONE ENCOUNTER
Requesting Contrave  LOV: 1/26/21  RTC: 7 weeks  Last Relevant Labs: na  Filled: 1/26/21 #120 with 0 refills    Future Appointments   Date Time Provider Lianet Ash   3/12/2021  8:20 AM MILLIE Bryant EMG [facility]

## 2021-02-28 ENCOUNTER — PATIENT MESSAGE (OUTPATIENT)
Dept: INTERNAL MEDICINE CLINIC | Facility: CLINIC | Age: 46
End: 2021-02-28

## 2021-03-01 NOTE — TELEPHONE ENCOUNTER
From: Wyatt Palma  To:  MILLIE Wiley  Sent: 2/28/2021 8:00 PM CST  Subject: Prescription Question    Ryder Summers,    Since I have started taking Contrave over the last few weeks I am feeling nauseous and I feel like I have to vomit but

## 2021-03-11 NOTE — PROGRESS NOTES
Apteegi 1 verbally consents to a Virtual/Telephone Check-In service on 03/12/21   Patient understands and accepts financial responsibility for any deductible, co-insurance and/or co-pays ass denies abdominal pain  PSYCH: denies any mood changes    Objective  EXAM  Reviewed most recent set of vitals   Physical Exam:  GENERAL: well developed, well nourished, in no apparent distress, speaking in full sentences comfortably   SKIN: warm, pink, dry Disp: 120 tablet, Rfl: 0  Naltrexone-buPROPion HCl ER (CONTRAVE) 8-90 MG Oral Tablet 12 Hr, Take by mouth.  Sample  CSWFT  6/22, Disp: , Rfl:   Phentermine HCl 37.5 MG Oral Tab, Take 1 tablet (37.5 mg total) by mouth every morning before breakfast., Disp: 3 --advised of side effects and adverse effects of this medication  ?  Contradictions: has done phentermine for a while  · Reviewed labs from PCP  · Continue with vitamin b12 complex (OTC daily)   · Stress, encouraged stress relieving activities  · Encouraged berries, nuts (1/4 cup), tuna and crackers                 Protein Shakes: Premier protein or Core Power                Protein Bars: Rx Bars, Oatmega, Power Crunch                 Sargento balanced breaks (cheese and nuts)- without chocolate  5.  Reduce ca Blackberries: Half a cup (70 grams) contains 4 grams of carbs. Strawberries: Half a cup (100 grams) contains 6 grams of carbs. Blueberries: Half a cup (50 grams) contains 6 grams of carbs. Plum: One medium-sized (80 grams) contains 6 grams of carbs.

## 2021-03-12 ENCOUNTER — TELEMEDICINE (OUTPATIENT)
Dept: INTERNAL MEDICINE CLINIC | Facility: CLINIC | Age: 46
End: 2021-03-12

## 2021-03-12 VITALS — WEIGHT: 212 LBS | BODY MASS INDEX: 31 KG/M2

## 2021-03-12 DIAGNOSIS — E66.9 OBESITY (BMI 30.0-34.9): ICD-10-CM

## 2021-03-12 DIAGNOSIS — F43.9 STRESS: ICD-10-CM

## 2021-03-12 DIAGNOSIS — E55.9 VITAMIN D DEFICIENCY: ICD-10-CM

## 2021-03-12 DIAGNOSIS — E53.8 VITAMIN B12 DEFICIENCY: ICD-10-CM

## 2021-03-12 DIAGNOSIS — Z51.81 THERAPEUTIC DRUG MONITORING: Primary | ICD-10-CM

## 2021-03-12 PROCEDURE — 99213 OFFICE O/P EST LOW 20 MIN: CPT | Performed by: NURSE PRACTITIONER

## 2021-03-12 NOTE — PATIENT INSTRUCTIONS
We are here to support you with weight loss, but please remember that you still need your primary care provider for your routine health maintenance.       PLAN:  Will start plenity   Follow up with me in 2 month  Schedule follow up appointments: Jcarlos Murphy nutrition section-- \"nutrients\" and it will break down your macros for the day (ie. Protein, carbs, fibers, sugars and fats). Try to stay within these numbers daily     2.  \"7 minute workout\" to help with exercise/activity which takes 7 minutes of your

## 2021-03-16 ENCOUNTER — TELEPHONE (OUTPATIENT)
Dept: INTERNAL MEDICINE CLINIC | Facility: CLINIC | Age: 46
End: 2021-03-16

## 2021-03-16 NOTE — TELEPHONE ENCOUNTER
I called to clarify. They got the patient chart notes but no prescription written for the plenity. I contacted office and they resent the prescription to [facility] now.

## 2021-03-16 NOTE — TELEPHONE ENCOUNTER
[facility] called left vmail regarding rx plenity and to follow up on chart notes that were faxed over

## 2021-04-17 ENCOUNTER — IMMUNIZATION (OUTPATIENT)
Dept: LAB | Age: 46
End: 2021-04-17
Attending: HOSPITALIST
Payer: COMMERCIAL

## 2021-04-17 DIAGNOSIS — Z23 NEED FOR VACCINATION: Primary | ICD-10-CM

## 2021-04-17 PROCEDURE — 0001A SARSCOV2 VAC 30MCG/0.3ML IM: CPT

## 2021-04-29 ENCOUNTER — MED REC SCAN ONLY (OUTPATIENT)
Dept: FAMILY MEDICINE CLINIC | Facility: CLINIC | Age: 46
End: 2021-04-29

## 2021-05-15 ENCOUNTER — IMMUNIZATION (OUTPATIENT)
Dept: LAB | Age: 46
End: 2021-05-15
Attending: HOSPITALIST
Payer: COMMERCIAL

## 2021-05-15 DIAGNOSIS — Z23 NEED FOR VACCINATION: Primary | ICD-10-CM

## 2021-05-15 PROCEDURE — 0002A SARSCOV2 VAC 30MCG/0.3ML IM: CPT

## 2021-07-06 ENCOUNTER — OFFICE VISIT (OUTPATIENT)
Dept: UROLOGY | Facility: HOSPITAL | Age: 46
End: 2021-07-06
Attending: OBSTETRICS & GYNECOLOGY
Payer: COMMERCIAL

## 2021-07-06 VITALS — HEIGHT: 69 IN | BODY MASS INDEX: 31.4 KG/M2 | TEMPERATURE: 98 F | WEIGHT: 212 LBS

## 2021-07-06 DIAGNOSIS — Z98.890 POST-OPERATIVE STATE: ICD-10-CM

## 2021-07-06 DIAGNOSIS — N95.2 POSTMENOPAUSAL ATROPHIC VAGINITIS: ICD-10-CM

## 2021-07-06 DIAGNOSIS — N81.84 PELVIC MUSCLE WASTING: ICD-10-CM

## 2021-07-06 DIAGNOSIS — N39.41 URGE INCONTINENCE: Primary | ICD-10-CM

## 2021-07-06 PROCEDURE — 99212 OFFICE O/P EST SF 10 MIN: CPT

## 2021-07-06 RX ORDER — OXYBUTYNIN CHLORIDE 5 MG/1
5 TABLET, EXTENDED RELEASE ORAL DAILY
Qty: 90 TABLET | Refills: 3 | Status: SHIPPED | OUTPATIENT
Start: 2021-07-06

## 2021-07-06 NOTE — PROGRESS NOTES
She is s/p Post-Op Summary  Procedure Date: 02/26/15  Procedure Name: Prolene Mesh Mid Urethral Sling;Cystoscopy  Post-Op Symptoms: Patient denies pain, AMRITA, UUI, prolapse symptoms, nausea/vomitting, fevers/chills, bleeding, voiding dysfunction, and defeca

## 2021-08-02 ENCOUNTER — LAB ENCOUNTER (OUTPATIENT)
Dept: LAB | Age: 46
End: 2021-08-02
Attending: INTERNAL MEDICINE
Payer: COMMERCIAL

## 2021-08-02 DIAGNOSIS — R79.89 ELEVATED PLATELET COUNT: ICD-10-CM

## 2021-08-02 DIAGNOSIS — M31.30 GRANULOMATOSIS WITH POLYANGIITIS WITHOUT RENAL INVOLVEMENT (HCC): ICD-10-CM

## 2021-08-02 LAB
ALBUMIN SERPL-MCNC: 3.2 G/DL (ref 3.4–5)
ALP LIVER SERPL-CCNC: 97 U/L
ALT SERPL-CCNC: 30 U/L
AST SERPL-CCNC: 15 U/L (ref 15–37)
BASOPHILS # BLD AUTO: 0.07 X10(3) UL (ref 0–0.2)
BASOPHILS NFR BLD AUTO: 0.7 %
BILIRUB DIRECT SERPL-MCNC: <0.1 MG/DL (ref 0–0.2)
BILIRUB SERPL-MCNC: 0.4 MG/DL (ref 0.1–2)
BILIRUB UR QL STRIP.AUTO: NEGATIVE
COLOR UR AUTO: YELLOW
CREAT BLD-MCNC: 0.62 MG/DL
CREAT UR-SCNC: 200 MG/DL
CRP SERPL-MCNC: 1.29 MG/DL (ref ?–0.3)
EOSINOPHIL # BLD AUTO: 0.4 X10(3) UL (ref 0–0.7)
EOSINOPHIL NFR BLD AUTO: 3.8 %
ERYTHROCYTE [DISTWIDTH] IN BLOOD BY AUTOMATED COUNT: 14.1 %
GLUCOSE UR STRIP.AUTO-MCNC: NEGATIVE MG/DL
HCT VFR BLD AUTO: 40.6 %
HGB BLD-MCNC: 13.2 G/DL
IMM GRANULOCYTES # BLD AUTO: 0.06 X10(3) UL (ref 0–1)
IMM GRANULOCYTES NFR BLD: 0.6 %
KETONES UR STRIP.AUTO-MCNC: NEGATIVE MG/DL
LYMPHOCYTES # BLD AUTO: 2.8 X10(3) UL (ref 1–4)
LYMPHOCYTES NFR BLD AUTO: 26.8 %
M PROTEIN MFR SERPL ELPH: 7.2 G/DL (ref 6.4–8.2)
MCH RBC QN AUTO: 30.4 PG (ref 26–34)
MCHC RBC AUTO-ENTMCNC: 32.5 G/DL (ref 31–37)
MCV RBC AUTO: 93.5 FL
MONOCYTES # BLD AUTO: 0.88 X10(3) UL (ref 0.1–1)
MONOCYTES NFR BLD AUTO: 8.4 %
NEUTROPHILS # BLD AUTO: 6.25 X10 (3) UL (ref 1.5–7.7)
NEUTROPHILS # BLD AUTO: 6.25 X10(3) UL (ref 1.5–7.7)
NEUTROPHILS NFR BLD AUTO: 59.7 %
NITRITE UR QL STRIP.AUTO: NEGATIVE
PH UR STRIP.AUTO: 5 [PH] (ref 5–8)
PLATELET # BLD AUTO: 399 10(3)UL (ref 150–450)
PROT UR STRIP.AUTO-MCNC: NEGATIVE MG/DL
PROT UR-MCNC: 17.6 MG/DL
RBC # BLD AUTO: 4.34 X10(6)UL
SED RATE-ML: 15 MM/HR
SP GR UR STRIP.AUTO: 1.02 (ref 1–1.03)
UROBILINOGEN UR STRIP.AUTO-MCNC: <2 MG/DL
WBC # BLD AUTO: 10.5 X10(3) UL (ref 4–11)

## 2021-08-02 PROCEDURE — 82565 ASSAY OF CREATININE: CPT

## 2021-08-02 PROCEDURE — 81001 URINALYSIS AUTO W/SCOPE: CPT

## 2021-08-02 PROCEDURE — 86140 C-REACTIVE PROTEIN: CPT

## 2021-08-02 PROCEDURE — 85652 RBC SED RATE AUTOMATED: CPT

## 2021-08-02 PROCEDURE — 85025 COMPLETE CBC W/AUTO DIFF WBC: CPT

## 2021-08-02 PROCEDURE — 84156 ASSAY OF PROTEIN URINE: CPT

## 2021-08-02 PROCEDURE — 82570 ASSAY OF URINE CREATININE: CPT

## 2021-08-02 PROCEDURE — 83876 ASSAY MYELOPEROXIDASE: CPT

## 2021-08-02 PROCEDURE — 86255 FLUORESCENT ANTIBODY SCREEN: CPT

## 2021-08-02 PROCEDURE — 80076 HEPATIC FUNCTION PANEL: CPT

## 2021-08-02 PROCEDURE — 36415 COLL VENOUS BLD VENIPUNCTURE: CPT

## 2021-08-06 LAB
MYELOPEROX ANTIBODIES, IGG: 0 AU/ML
SERINE PROTEASE3, IGG: 0 AU/ML

## 2021-08-08 PROBLEM — M31.30 GRANULOMATOSIS WITH POLYANGIITIS (HCC): Status: ACTIVE | Noted: 2017-03-22

## 2021-08-09 ENCOUNTER — HOSPITAL ENCOUNTER (OUTPATIENT)
Dept: MAMMOGRAPHY | Age: 46
Discharge: HOME | End: 2021-08-09
Attending: FAMILY MEDICINE
Payer: COMMERCIAL

## 2021-08-09 DIAGNOSIS — Z12.31 ENCOUNTER FOR SCREENING MAMMOGRAM FOR MALIGNANT NEOPLASM OF BREAST: ICD-10-CM

## 2021-08-09 PROCEDURE — 77063 BREAST TOMOSYNTHESIS BI: CPT | Performed by: FAMILY MEDICINE

## 2021-08-09 PROCEDURE — 77067 SCR MAMMO BI INCL CAD: CPT | Performed by: FAMILY MEDICINE

## 2021-08-12 ENCOUNTER — LAB ENCOUNTER (OUTPATIENT)
Dept: LAB | Age: 46
End: 2021-08-12
Attending: INTERNAL MEDICINE
Payer: COMMERCIAL

## 2021-08-12 DIAGNOSIS — E55.9 VITAMIN D DEFICIENCY: ICD-10-CM

## 2021-08-12 DIAGNOSIS — R94.7 ABNORMAL RESULTS OF OTHER ENDOCRINE FUNCTION STUDIES: ICD-10-CM

## 2021-08-12 DIAGNOSIS — E04.1 THYROID NODULE: ICD-10-CM

## 2021-08-12 DIAGNOSIS — E27.0 HYPERCORTISOLEMIA (HCC): ICD-10-CM

## 2021-08-12 DIAGNOSIS — R63.5 ABNORMAL WEIGHT GAIN: ICD-10-CM

## 2021-08-12 LAB
CORTIS SERPL-MCNC: 24.2 UG/DL
T4 FREE SERPL-MCNC: 1 NG/DL (ref 0.8–1.7)
TSI SER-ACNC: 1.83 MIU/ML (ref 0.36–3.74)

## 2021-08-12 PROCEDURE — 82533 TOTAL CORTISOL: CPT

## 2021-08-12 PROCEDURE — 84439 ASSAY OF FREE THYROXINE: CPT

## 2021-08-12 PROCEDURE — 82024 ASSAY OF ACTH: CPT

## 2021-08-12 PROCEDURE — 84443 ASSAY THYROID STIM HORMONE: CPT

## 2021-08-12 PROCEDURE — 36415 COLL VENOUS BLD VENIPUNCTURE: CPT

## 2021-08-13 ENCOUNTER — HOSPITAL ENCOUNTER (OUTPATIENT)
Dept: CT IMAGING | Facility: HOSPITAL | Age: 46
Discharge: HOME | End: 2021-08-13
Attending: INTERNAL MEDICINE
Payer: COMMERCIAL

## 2021-08-13 DIAGNOSIS — R91.8 MULTIPLE PULMONARY NODULES: ICD-10-CM

## 2021-08-13 PROCEDURE — 71250 CT THORAX DX C-: CPT | Performed by: INTERNAL MEDICINE

## 2021-08-14 LAB — ADRENOCORTICOTROPIC HORMONE: 17.2 PG/ML

## 2021-08-21 ENCOUNTER — MED REC SCAN ONLY (OUTPATIENT)
Dept: FAMILY MEDICINE CLINIC | Facility: CLINIC | Age: 46
End: 2021-08-21

## 2021-11-23 PROCEDURE — 82533 TOTAL CORTISOL: CPT

## 2021-11-24 ENCOUNTER — LAB ENCOUNTER (OUTPATIENT)
Dept: LAB | Facility: HOSPITAL | Age: 46
End: 2021-11-24
Attending: INTERNAL MEDICINE
Payer: COMMERCIAL

## 2021-11-24 DIAGNOSIS — E27.0 HYPERCORTISOLEMIA (HCC): ICD-10-CM

## 2021-12-30 ENCOUNTER — IMMUNIZATION (OUTPATIENT)
Dept: LAB | Facility: HOSPITAL | Age: 46
End: 2021-12-30
Attending: EMERGENCY MEDICINE
Payer: COMMERCIAL

## 2021-12-30 DIAGNOSIS — Z23 NEED FOR VACCINATION: Primary | ICD-10-CM

## 2021-12-30 PROCEDURE — 0064A SARSCOV2 VAC 50MCG/0.25ML IM: CPT | Performed by: NURSE PRACTITIONER

## 2022-06-08 ENCOUNTER — HOSPITAL ENCOUNTER (OUTPATIENT)
Age: 47
Discharge: HOME | End: 2022-06-08
Payer: COMMERCIAL

## 2022-06-08 VITALS
RESPIRATION RATE: 16 BRPM | BODY MASS INDEX: 33.33 KG/M2 | TEMPERATURE: 99 F | WEIGHT: 225 LBS | OXYGEN SATURATION: 97 % | HEART RATE: 102 BPM | HEIGHT: 69 IN | SYSTOLIC BLOOD PRESSURE: 128 MMHG | DIASTOLIC BLOOD PRESSURE: 57 MMHG

## 2022-06-08 DIAGNOSIS — J06.9 VIRAL URI: Primary | ICD-10-CM

## 2022-06-08 LAB
S PYO AG THROAT QL: NEGATIVE
SARS-COV-2 RNA RESP QL NAA+PROBE: NOT DETECTED

## 2022-06-08 PROCEDURE — 99213 OFFICE O/P EST LOW 20 MIN: CPT | Performed by: NURSE PRACTITIONER

## 2022-06-08 PROCEDURE — 87880 STREP A ASSAY W/OPTIC: CPT | Performed by: NURSE PRACTITIONER

## 2022-06-08 PROCEDURE — U0002 COVID-19 LAB TEST NON-CDC: HCPCS | Performed by: NURSE PRACTITIONER

## 2022-06-08 NOTE — ED INITIAL ASSESSMENT (HPI)
Patient reports sore throat x 2 days as well as congestion and headache. States she did have covid in April. Denies any fever. Denies cough. Admits to some slight seasonal allergies. States she did have tonsils removed a few years back. Reports fullness in ears and phlegm build up.

## 2022-06-11 ENCOUNTER — HOSPITAL ENCOUNTER (OUTPATIENT)
Age: 47
Discharge: HOME | End: 2022-06-11
Payer: COMMERCIAL

## 2022-06-11 VITALS
SYSTOLIC BLOOD PRESSURE: 141 MMHG | HEART RATE: 111 BPM | DIASTOLIC BLOOD PRESSURE: 91 MMHG | OXYGEN SATURATION: 97 % | HEIGHT: 69 IN | WEIGHT: 225 LBS | TEMPERATURE: 97 F | BODY MASS INDEX: 33.33 KG/M2 | RESPIRATION RATE: 18 BRPM

## 2022-06-11 DIAGNOSIS — H65.192 OTHER NON-RECURRENT ACUTE NONSUPPURATIVE OTITIS MEDIA OF LEFT EAR: ICD-10-CM

## 2022-06-11 DIAGNOSIS — Z20.822 CONTACT WITH AND (SUSPECTED) EXPOSURE TO COVID-19: Primary | ICD-10-CM

## 2022-06-11 DIAGNOSIS — J06.9 VIRAL URI: ICD-10-CM

## 2022-06-11 LAB — SARS-COV-2 RNA RESP QL NAA+PROBE: NOT DETECTED

## 2022-06-11 PROCEDURE — 99213 OFFICE O/P EST LOW 20 MIN: CPT | Performed by: PHYSICIAN ASSISTANT

## 2022-06-11 PROCEDURE — U0002 COVID-19 LAB TEST NON-CDC: HCPCS | Performed by: PHYSICIAN ASSISTANT

## 2022-06-11 RX ORDER — AMOXICILLIN AND CLAVULANATE POTASSIUM 875; 125 MG/1; MG/1
1 TABLET, FILM COATED ORAL 2 TIMES DAILY
Qty: 20 TABLET | Refills: 0 | Status: SHIPPED | OUTPATIENT
Start: 2022-06-11 | End: 2022-06-21

## 2022-06-11 NOTE — ED INITIAL ASSESSMENT (HPI)
Pt has had sinus congestion, sore throat and ear pain.   She was here 3 days ago with a URI, she has had symptoms a total of 5 days

## 2022-06-22 ENCOUNTER — MED REC SCAN ONLY (OUTPATIENT)
Dept: FAMILY MEDICINE CLINIC | Facility: CLINIC | Age: 47
End: 2022-06-22

## 2022-06-28 ENCOUNTER — TELEPHONE (OUTPATIENT)
Dept: UROLOGY | Facility: CLINIC | Age: 47
End: 2022-06-28

## 2022-06-28 ENCOUNTER — VIRTUAL PHONE E/M (OUTPATIENT)
Dept: UROLOGY | Facility: CLINIC | Age: 47
End: 2022-06-28
Attending: OBSTETRICS & GYNECOLOGY
Payer: COMMERCIAL

## 2022-06-28 VITALS — WEIGHT: 225 LBS | BODY MASS INDEX: 33.33 KG/M2 | HEIGHT: 69 IN

## 2022-06-28 DIAGNOSIS — N39.0 FREQUENT UTI: ICD-10-CM

## 2022-06-28 DIAGNOSIS — N39.41 URGE INCONTINENCE: Primary | ICD-10-CM

## 2022-06-28 DIAGNOSIS — N95.2 POSTMENOPAUSAL ATROPHIC VAGINITIS: ICD-10-CM

## 2022-06-28 NOTE — TELEPHONE ENCOUNTER
TC to pt   Will work to reschedule annual appt for DO/UUI    Taking oxybutynin ER 5mg  Sx worsening, tried two  With better sx resolution  Bad dry mouth  Denies worsening constipation    some UTIs - reports h/o UTIs, cynthia with intercourse  NTF w/ coitus  Report UTI q month    Bowels reg  HSV stable, no outbreaks  No dyspareunia  Some AMRITA    Weight up some  Vag estrogen use irreg    AMRITA - s/p MUS, cysto  UUI - Discussed dietary and behavioral modifications for mgmt of urinary symptoms  Discussed weight management and benefits of weight loss on urinary symptoms  Reviewed AUA/SUFU guidelines on mgmt of non-neurogenic OAB  Discussed pharmacologic and nonpharmacologic mgmt options of urinary symptoms - reviewed risks, benefits, alternatives, and goals of treatment  Discussed specific risks related to OAB meds including, but not limited to dry mouth, constipation, blurry vision, cognitive changes, and BP elevation.     Oxybutynin ER 10mg daiy    UTIs - NTF w/ coitus historically  Take NTF at bedtime x3mo  Call with s/sx of UTI  Bactrim DS BID x7d    Vag estrogen twice weekly    Daily pelvic exercises

## 2022-07-01 ENCOUNTER — OFFICE VISIT (OUTPATIENT)
Dept: OTOLARYNGOLOGY | Facility: CLINIC | Age: 47
End: 2022-07-01
Payer: COMMERCIAL

## 2022-07-01 VITALS — TEMPERATURE: 98 F

## 2022-07-01 DIAGNOSIS — J32.9 CHRONIC SINUSITIS, UNSPECIFIED LOCATION: Primary | ICD-10-CM

## 2022-07-01 DIAGNOSIS — N39.41 URGE INCONTINENCE: ICD-10-CM

## 2022-07-01 PROCEDURE — 99213 OFFICE O/P EST LOW 20 MIN: CPT | Performed by: OTOLARYNGOLOGY

## 2022-07-01 RX ORDER — AZELASTINE 1 MG/ML
2 SPRAY, METERED NASAL 2 TIMES DAILY
Qty: 30 ML | Refills: 0 | Status: SHIPPED | OUTPATIENT
Start: 2022-07-01

## 2022-07-01 RX ORDER — OXYBUTYNIN CHLORIDE 10 MG/1
10 TABLET, EXTENDED RELEASE ORAL DAILY
Qty: 90 TABLET | Refills: 3 | Status: SHIPPED | OUTPATIENT
Start: 2022-07-01

## 2022-07-01 RX ORDER — OXYBUTYNIN CHLORIDE 5 MG/1
TABLET, EXTENDED RELEASE ORAL
Qty: 90 TABLET | Refills: 3 | OUTPATIENT
Start: 2022-07-01

## 2022-07-01 RX ORDER — METHYLPREDNISOLONE 4 MG/1
TABLET ORAL
Qty: 21 TABLET | Refills: 0 | Status: SHIPPED | OUTPATIENT
Start: 2022-07-01

## 2022-07-01 RX ORDER — AMOXICILLIN AND CLAVULANATE POTASSIUM 875; 125 MG/1; MG/1
1 TABLET, FILM COATED ORAL EVERY 12 HOURS
Qty: 20 TABLET | Refills: 0 | Status: SHIPPED | OUTPATIENT
Start: 2022-07-01

## 2022-07-01 RX ORDER — MONTELUKAST SODIUM 10 MG/1
10 TABLET ORAL NIGHTLY
Qty: 30 TABLET | Refills: 3 | Status: SHIPPED | OUTPATIENT
Start: 2022-07-01

## 2022-07-21 ENCOUNTER — LAB ENCOUNTER (OUTPATIENT)
Dept: LAB | Facility: HOSPITAL | Age: 47
End: 2022-07-21
Attending: INTERNAL MEDICINE
Payer: COMMERCIAL

## 2022-07-21 ENCOUNTER — PATIENT MESSAGE (OUTPATIENT)
Dept: FAMILY MEDICINE CLINIC | Facility: CLINIC | Age: 47
End: 2022-07-21

## 2022-07-21 DIAGNOSIS — M31.30 GRANULOMATOSIS WITH POLYANGIITIS WITHOUT RENAL INVOLVEMENT (HCC): ICD-10-CM

## 2022-07-21 LAB
ALBUMIN SERPL-MCNC: 3 G/DL (ref 3.4–5)
ALP LIVER SERPL-CCNC: 114 U/L
ALT SERPL-CCNC: 18 U/L
AST SERPL-CCNC: 11 U/L (ref 15–37)
BASOPHILS # BLD AUTO: 0.06 X10(3) UL (ref 0–0.2)
BASOPHILS NFR BLD AUTO: 0.6 %
BILIRUB DIRECT SERPL-MCNC: 0.1 MG/DL (ref 0–0.2)
BILIRUB SERPL-MCNC: 0.4 MG/DL (ref 0.1–2)
BILIRUB UR QL STRIP.AUTO: NEGATIVE
COLOR UR AUTO: YELLOW
CREAT BLD-MCNC: 0.64 MG/DL
CREAT UR-SCNC: 102 MG/DL
CRP SERPL-MCNC: 8.77 MG/DL (ref ?–0.3)
EOSINOPHIL # BLD AUTO: 0.29 X10(3) UL (ref 0–0.7)
EOSINOPHIL NFR BLD AUTO: 2.7 %
ERYTHROCYTE [DISTWIDTH] IN BLOOD BY AUTOMATED COUNT: 14.4 %
ERYTHROCYTE [SEDIMENTATION RATE] IN BLOOD: 27 MM/HR
GLUCOSE UR STRIP.AUTO-MCNC: NEGATIVE MG/DL
HCT VFR BLD AUTO: 39.9 %
HGB BLD-MCNC: 12.4 G/DL
IMM GRANULOCYTES # BLD AUTO: 0.05 X10(3) UL (ref 0–1)
IMM GRANULOCYTES NFR BLD: 0.5 %
KETONES UR STRIP.AUTO-MCNC: NEGATIVE MG/DL
LYMPHOCYTES # BLD AUTO: 3.08 X10(3) UL (ref 1–4)
LYMPHOCYTES NFR BLD AUTO: 28.6 %
MCH RBC QN AUTO: 29 PG (ref 26–34)
MCHC RBC AUTO-ENTMCNC: 31.1 G/DL (ref 31–37)
MCV RBC AUTO: 93.4 FL
MONOCYTES # BLD AUTO: 0.93 X10(3) UL (ref 0.1–1)
MONOCYTES NFR BLD AUTO: 8.6 %
NEUTROPHILS # BLD AUTO: 6.36 X10 (3) UL (ref 1.5–7.7)
NEUTROPHILS # BLD AUTO: 6.36 X10(3) UL (ref 1.5–7.7)
NEUTROPHILS NFR BLD AUTO: 59 %
NITRITE UR QL STRIP.AUTO: NEGATIVE
PH UR STRIP.AUTO: 6 [PH] (ref 5–8)
PLATELET # BLD AUTO: 376 10(3)UL (ref 150–450)
PROT SERPL-MCNC: 7.1 G/DL (ref 6.4–8.2)
PROT UR STRIP.AUTO-MCNC: NEGATIVE MG/DL
PROT UR-MCNC: 12.9 MG/DL
RBC # BLD AUTO: 4.27 X10(6)UL
SP GR UR STRIP.AUTO: 1.01 (ref 1–1.03)
UROBILINOGEN UR STRIP.AUTO-MCNC: <2 MG/DL
WBC # BLD AUTO: 10.8 X10(3) UL (ref 4–11)

## 2022-07-21 PROCEDURE — 82570 ASSAY OF URINE CREATININE: CPT

## 2022-07-21 PROCEDURE — 80076 HEPATIC FUNCTION PANEL: CPT

## 2022-07-21 PROCEDURE — 36415 COLL VENOUS BLD VENIPUNCTURE: CPT

## 2022-07-21 PROCEDURE — 85652 RBC SED RATE AUTOMATED: CPT

## 2022-07-21 PROCEDURE — 86140 C-REACTIVE PROTEIN: CPT

## 2022-07-21 PROCEDURE — 83876 ASSAY MYELOPEROXIDASE: CPT

## 2022-07-21 PROCEDURE — 85025 COMPLETE CBC W/AUTO DIFF WBC: CPT

## 2022-07-21 PROCEDURE — 82565 ASSAY OF CREATININE: CPT

## 2022-07-21 PROCEDURE — 84156 ASSAY OF PROTEIN URINE: CPT

## 2022-07-21 PROCEDURE — 81001 URINALYSIS AUTO W/SCOPE: CPT

## 2022-07-21 NOTE — TELEPHONE ENCOUNTER
At this point since she is under 48 and isn't moderately or severely immune compromised, no booster indicated unless guidelines change.

## 2022-07-23 LAB
MYELOPEROX ANTIBODIES, IGG: 0 AU/ML
SERINE PROTEASE 3, IGG: 1 AU/ML

## 2022-07-25 RX ORDER — AZELASTINE HYDROCHLORIDE 137 UG/1
SPRAY, METERED NASAL
Qty: 30 ML | Refills: 3 | Status: SHIPPED | OUTPATIENT
Start: 2022-07-25

## 2022-07-29 ENCOUNTER — OFFICE VISIT (OUTPATIENT)
Dept: OTOLARYNGOLOGY | Facility: CLINIC | Age: 47
End: 2022-07-29
Payer: COMMERCIAL

## 2022-07-29 DIAGNOSIS — J32.9 CHRONIC SINUSITIS, UNSPECIFIED LOCATION: Primary | ICD-10-CM

## 2022-07-29 PROCEDURE — 99213 OFFICE O/P EST LOW 20 MIN: CPT | Performed by: OTOLARYNGOLOGY

## 2022-07-29 RX ORDER — NEOMYCIN SULFATE, POLYMYXIN B SULFATE AND HYDROCORTISONE 10; 3.5; 1 MG/ML; MG/ML; [USP'U]/ML
SUSPENSION/ DROPS AURICULAR (OTIC) 4 TIMES DAILY
COMMUNITY

## 2022-07-29 RX ORDER — FLUOCINOLONE ACETONIDE 0.11 MG/ML
OIL AURICULAR (OTIC)
COMMUNITY

## 2022-07-29 RX ORDER — AZELASTINE HYDROCHLORIDE 137 UG/1
2 SPRAY, METERED NASAL 2 TIMES DAILY
Qty: 30 ML | Refills: 3 | Status: SHIPPED | OUTPATIENT
Start: 2022-07-29

## 2022-08-30 ENCOUNTER — OFFICE VISIT (OUTPATIENT)
Dept: UROLOGY | Facility: CLINIC | Age: 47
End: 2022-08-30
Attending: OBSTETRICS & GYNECOLOGY
Payer: COMMERCIAL

## 2022-08-30 VITALS — BODY MASS INDEX: 37.49 KG/M2 | TEMPERATURE: 98 F | HEIGHT: 65 IN | WEIGHT: 225 LBS

## 2022-08-30 DIAGNOSIS — N39.0 FREQUENT UTI: ICD-10-CM

## 2022-08-30 DIAGNOSIS — N95.2 POSTMENOPAUSAL ATROPHIC VAGINITIS: ICD-10-CM

## 2022-08-30 DIAGNOSIS — N39.41 URGE INCONTINENCE: Primary | ICD-10-CM

## 2022-08-30 DIAGNOSIS — Z98.890 POST-OPERATIVE STATE: ICD-10-CM

## 2022-08-30 DIAGNOSIS — N81.84 PELVIC MUSCLE WASTING: ICD-10-CM

## 2022-08-30 PROCEDURE — 99212 OFFICE O/P EST SF 10 MIN: CPT

## 2022-08-30 RX ORDER — MIRABEGRON 50 MG/1
50 TABLET, FILM COATED, EXTENDED RELEASE ORAL DAILY
Qty: 90 TABLET | Refills: 3 | Status: SHIPPED | OUTPATIENT
Start: 2022-08-30 | End: 2022-09-29

## 2022-09-06 ENCOUNTER — HOSPITAL ENCOUNTER (OUTPATIENT)
Dept: MAMMOGRAPHY | Age: 47
Discharge: HOME | End: 2022-09-06
Attending: FAMILY MEDICINE
Payer: COMMERCIAL

## 2022-09-06 DIAGNOSIS — Z12.31 ENCOUNTER FOR SCREENING MAMMOGRAM FOR MALIGNANT NEOPLASM OF BREAST: ICD-10-CM

## 2022-09-06 PROCEDURE — 77067 SCR MAMMO BI INCL CAD: CPT | Performed by: FAMILY MEDICINE

## 2022-09-06 PROCEDURE — 77063 BREAST TOMOSYNTHESIS BI: CPT | Performed by: FAMILY MEDICINE

## 2022-09-08 ENCOUNTER — TELEPHONE (OUTPATIENT)
Dept: UROLOGY | Facility: CLINIC | Age: 47
End: 2022-09-08

## 2022-09-08 NOTE — TELEPHONE ENCOUNTER
TC from pt states her Myrbetriq coverage was $240 through [facility] and she can not afford to continue on medication. Has not initiated yet. Asking for alternative tx. Pt requesting names of other OAB meds; given information and also explained prior authorization option and coupon option. 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   Message forwarded to [facility] PA

## 2022-09-09 RX ORDER — SOLIFENACIN SUCCINATE 5 MG/1
5 TABLET, FILM COATED ORAL DAILY
Qty: 30 TABLET | Refills: 1 | Status: SHIPPED | OUTPATIENT
Start: 2022-09-09

## 2022-09-09 NOTE — TELEPHONE ENCOUNTER
TC to patient to offer vesicare 5mg with 4 week med f/u apt. No answer, order entered with vm for pt to cb to confirm.

## 2022-09-13 ENCOUNTER — OFFICE VISIT (OUTPATIENT)
Dept: FAMILY MEDICINE CLINIC | Facility: CLINIC | Age: 47
End: 2022-09-13

## 2022-09-13 VITALS
HEIGHT: 69 IN | DIASTOLIC BLOOD PRESSURE: 70 MMHG | BODY MASS INDEX: 34.36 KG/M2 | RESPIRATION RATE: 14 BRPM | TEMPERATURE: 98 F | HEART RATE: 72 BPM | WEIGHT: 232 LBS | SYSTOLIC BLOOD PRESSURE: 108 MMHG

## 2022-09-13 DIAGNOSIS — K90.0 CELIAC DISEASE: ICD-10-CM

## 2022-09-13 DIAGNOSIS — Z71.3 WEIGHT LOSS COUNSELING, ENCOUNTER FOR: ICD-10-CM

## 2022-09-13 DIAGNOSIS — E55.9 VITAMIN D DEFICIENCY: ICD-10-CM

## 2022-09-13 DIAGNOSIS — Z00.00 WELL WOMAN EXAM WITHOUT GYNECOLOGICAL EXAM: Primary | ICD-10-CM

## 2022-09-13 DIAGNOSIS — E53.8 VITAMIN B12 DEFICIENCY: ICD-10-CM

## 2022-09-13 DIAGNOSIS — E66.09 CLASS 1 OBESITY DUE TO EXCESS CALORIES WITHOUT SERIOUS COMORBIDITY WITH BODY MASS INDEX (BMI) OF 34.0 TO 34.9 IN ADULT: ICD-10-CM

## 2022-09-13 DIAGNOSIS — R20.2 PARESTHESIAS: ICD-10-CM

## 2022-09-13 PROCEDURE — 99396 PREV VISIT EST AGE 40-64: CPT | Performed by: FAMILY MEDICINE

## 2022-09-13 PROCEDURE — 3074F SYST BP LT 130 MM HG: CPT | Performed by: FAMILY MEDICINE

## 2022-09-13 PROCEDURE — 99213 OFFICE O/P EST LOW 20 MIN: CPT | Performed by: FAMILY MEDICINE

## 2022-09-13 PROCEDURE — 3008F BODY MASS INDEX DOCD: CPT | Performed by: FAMILY MEDICINE

## 2022-09-13 PROCEDURE — 3078F DIAST BP <80 MM HG: CPT | Performed by: FAMILY MEDICINE

## 2022-09-13 RX ORDER — SEMAGLUTIDE 0.25 MG/.5ML
0.25 INJECTION, SOLUTION SUBCUTANEOUS WEEKLY
Qty: 2 ML | Refills: 1 | Status: SHIPPED | OUTPATIENT
Start: 2022-09-13 | End: 2022-10-05

## 2022-09-17 ENCOUNTER — LAB ENCOUNTER (OUTPATIENT)
Dept: LAB | Age: 47
End: 2022-09-17
Attending: FAMILY MEDICINE
Payer: COMMERCIAL

## 2022-09-17 DIAGNOSIS — D89.2 HYPERGAMMAGLOBULINEMIA: Primary | ICD-10-CM

## 2022-09-17 DIAGNOSIS — E66.09 CLASS 1 OBESITY DUE TO EXCESS CALORIES WITHOUT SERIOUS COMORBIDITY WITH BODY MASS INDEX (BMI) OF 34.0 TO 34.9 IN ADULT: ICD-10-CM

## 2022-09-17 DIAGNOSIS — R20.2 PARESTHESIAS: ICD-10-CM

## 2022-09-17 LAB
ALBUMIN SERPL-MCNC: 3.2 G/DL (ref 3.4–5)
ALBUMIN/GLOB SERPL: 0.8 {RATIO} (ref 1–2)
ALP LIVER SERPL-CCNC: 99 U/L
ALT SERPL-CCNC: 23 U/L
ANION GAP SERPL CALC-SCNC: 8 MMOL/L (ref 0–18)
AST SERPL-CCNC: 13 U/L (ref 15–37)
BILIRUB SERPL-MCNC: 0.3 MG/DL (ref 0.1–2)
BUN BLD-MCNC: 8 MG/DL (ref 7–18)
CALCIUM BLD-MCNC: 9.1 MG/DL (ref 8.5–10.1)
CHLORIDE SERPL-SCNC: 109 MMOL/L (ref 98–112)
CHOLEST SERPL-MCNC: 194 MG/DL (ref ?–200)
CO2 SERPL-SCNC: 22 MMOL/L (ref 21–32)
CREAT BLD-MCNC: 0.68 MG/DL
EST. AVERAGE GLUCOSE BLD GHB EST-MCNC: 111 MG/DL (ref 68–126)
FASTING PATIENT LIPID ANSWER: YES
FASTING STATUS PATIENT QL REPORTED: YES
GFR SERPLBLD BASED ON 1.73 SQ M-ARVRAT: 108 ML/MIN/1.73M2 (ref 60–?)
GLOBULIN PLAS-MCNC: 4.1 G/DL (ref 2.8–4.4)
GLUCOSE BLD-MCNC: 94 MG/DL (ref 70–99)
HBA1C MFR BLD: 5.5 % (ref ?–5.7)
HDLC SERPL-MCNC: 49 MG/DL (ref 40–59)
LDLC SERPL CALC-MCNC: 125 MG/DL (ref ?–100)
NONHDLC SERPL-MCNC: 145 MG/DL (ref ?–130)
OSMOLALITY SERPL CALC.SUM OF ELEC: 286 MOSM/KG (ref 275–295)
POTASSIUM SERPL-SCNC: 3.9 MMOL/L (ref 3.5–5.1)
PROT SERPL-MCNC: 7.3 G/DL (ref 6.4–8.2)
SODIUM SERPL-SCNC: 139 MMOL/L (ref 136–145)
T4 FREE SERPL-MCNC: 1 NG/DL (ref 0.8–1.7)
TRIGL SERPL-MCNC: 111 MG/DL (ref 30–149)
TSI SER-ACNC: 1.89 MIU/ML (ref 0.36–3.74)
VLDLC SERPL CALC-MCNC: 20 MG/DL (ref 0–30)

## 2022-09-17 PROCEDURE — 80053 COMPREHEN METABOLIC PANEL: CPT

## 2022-09-17 PROCEDURE — 84443 ASSAY THYROID STIM HORMONE: CPT

## 2022-09-17 PROCEDURE — 84439 ASSAY OF FREE THYROXINE: CPT

## 2022-09-17 PROCEDURE — 80061 LIPID PANEL: CPT

## 2022-09-17 PROCEDURE — 83036 HEMOGLOBIN GLYCOSYLATED A1C: CPT

## 2022-09-17 PROCEDURE — 36415 COLL VENOUS BLD VENIPUNCTURE: CPT

## 2022-09-19 ENCOUNTER — HOSPITAL ENCOUNTER (OUTPATIENT)
Dept: MAMMOGRAPHY | Facility: HOSPITAL | Age: 47
Discharge: HOME | End: 2022-09-19
Attending: FAMILY MEDICINE

## 2022-09-19 ENCOUNTER — TELEPHONE (OUTPATIENT)
Dept: FAMILY MEDICINE CLINIC | Facility: CLINIC | Age: 47
End: 2022-09-19

## 2022-09-19 DIAGNOSIS — N64.9 BREAST LESION: Primary | ICD-10-CM

## 2022-09-19 DIAGNOSIS — R92.2 INCONCLUSIVE MAMMOGRAM: ICD-10-CM

## 2022-09-19 PROCEDURE — 77062 BREAST TOMOSYNTHESIS BI: CPT | Performed by: FAMILY MEDICINE

## 2022-09-19 PROCEDURE — 77066 DX MAMMO INCL CAD BI: CPT | Performed by: FAMILY MEDICINE

## 2022-09-19 PROCEDURE — 76641 ULTRASOUND BREAST COMPLETE: CPT | Performed by: FAMILY MEDICINE

## 2022-09-19 PROCEDURE — 76642 ULTRASOUND BREAST LIMITED: CPT | Performed by: FAMILY MEDICINE

## 2022-09-19 NOTE — TELEPHONE ENCOUNTER
Received call from / [facility] radiology  [id number]  Pt had mammogram done today    Claudette Slates requesting Katherine Morris PA-C to order:    stereotactic guided left breast biopsy for micro-calcifications    AND     6 month f/u US for right breast lesions     I pended order for US. Not sure how to order stereotactic breast biopsy?

## 2022-09-19 NOTE — TELEPHONE ENCOUNTER
I called radiology back to check on this.   Was informed that typically the radiologist calls with this info, but orders are faxed to provider by the breast care coordinators Bere Dougherty and Maranda Edmond)    Was given their phone number: [phone]  They are gone for the day    Will leave in the call bin for triage to follow up on this tomorrow

## 2022-09-19 NOTE — IMAGING NOTE
This Breast Care RN assisted Dr. Silvia Moraes with recommendation for a left breast 1 site stereotactic biopsy for calcifications. Procedure reviewed and all questions answered. Emotional and educational support given. On the day of the biopsy, pt instructed to take Tylenol 1000mg PO, eat a light meal & bring or wear a sports bra. Post biopsy care also reviewed with pt to include NO lifting more than 5lbs, no exercising or housework (limit upper body movement) for 24-48 hrs post biopsy. Patient denies blood thinners, bleeding disorders, liver disease, and chemo. Pt verbalized understanding. Our breast center schedulers will be calling to schedule an appt that is convenient for pt.

## 2022-09-20 ENCOUNTER — TELEPHONE (OUTPATIENT)
Dept: FAMILY MEDICINE CLINIC | Facility: CLINIC | Age: 47
End: 2022-09-20

## 2022-09-20 ENCOUNTER — LAB ENCOUNTER (OUTPATIENT)
Dept: LAB | Age: 47
End: 2022-09-20
Attending: FAMILY MEDICINE

## 2022-09-20 DIAGNOSIS — E55.9 VITAMIN D DEFICIENCY: ICD-10-CM

## 2022-09-20 DIAGNOSIS — E53.8 VITAMIN B12 DEFICIENCY: ICD-10-CM

## 2022-09-20 DIAGNOSIS — D89.2 HYPERGAMMAGLOBULINEMIA: ICD-10-CM

## 2022-09-20 PROCEDURE — 83521 IG LIGHT CHAINS FREE EACH: CPT | Performed by: FAMILY MEDICINE

## 2022-09-20 PROCEDURE — 82607 VITAMIN B-12: CPT | Performed by: FAMILY MEDICINE

## 2022-09-20 PROCEDURE — 82306 VITAMIN D 25 HYDROXY: CPT | Performed by: FAMILY MEDICINE

## 2022-09-20 PROCEDURE — 84165 PROTEIN E-PHORESIS SERUM: CPT | Performed by: FAMILY MEDICINE

## 2022-09-20 PROCEDURE — 86334 IMMUNOFIX E-PHORESIS SERUM: CPT | Performed by: FAMILY MEDICINE

## 2022-09-20 RX ORDER — PHENTERMINE HYDROCHLORIDE 15 MG/1
15 CAPSULE ORAL EVERY MORNING
Qty: 90 CAPSULE | Refills: 0 | Status: SHIPPED | OUTPATIENT
Start: 2022-09-20

## 2022-09-20 NOTE — TELEPHONE ENCOUNTER
Call from pt-sts dr Yeimi Pride ordered wegovy-will cost $004 for 80 days-asking about any more cost effective alternatives of discount programs. Discussed checking online for  coupons or discount card. Pharmacy may have other discount info. Pt also mentions spoke w dr Yeimi Pride regarding phentermine-\"usually see nelsy GOLDSTEIN, but last appt was with dr Alethea Mason"- asking if phentermine is still an option or not. Advised will forward to dr Yeimi Pride and call back w his input. Patient voices understanding/agrees with plan/no further questions. **see above and advise-thanks!

## 2022-09-20 NOTE — TELEPHONE ENCOUNTER
S/W pt and she agreed to both Phentermine and Ozempic. Per pt she will need to pay $75.00 for 3 months supply for Ozempic and she is fine with that.

## 2022-09-20 NOTE — TELEPHONE ENCOUNTER
We can start phentermine 15 mg daily and have her update me in 2 weeks. If the patient wishes to substitute Ozempic for the [facility] TONIA we could try that.   We could start at 0.25 mg weekly

## 2022-09-21 LAB
VIT B12 SERPL-MCNC: 455 PG/ML (ref 193–986)
VIT D+METAB SERPL-MCNC: 54.5 NG/ML (ref 30–100)

## 2022-09-28 LAB
ALBUMIN SERPL ELPH-MCNC: 3.7 G/DL (ref 3.75–5.21)
ALBUMIN/GLOB SERPL: 1.12 {RATIO} (ref 1–2)
ALPHA1 GLOB SERPL ELPH-MCNC: 0.38 G/DL (ref 0.19–0.46)
ALPHA2 GLOB SERPL ELPH-MCNC: 0.92 G/DL (ref 0.48–1.05)
B-GLOBULIN SERPL ELPH-MCNC: 1.08 G/DL (ref 0.68–1.23)
GAMMA GLOB SERPL ELPH-MCNC: 0.92 G/DL (ref 0.62–1.7)
KAPPA LC FREE SER-MCNC: 2.09 MG/DL (ref 0.33–1.94)
KAPPA LC FREE/LAMBDA FREE SER NEPH: 1.01 {RATIO} (ref 0.26–1.65)
LAMBDA LC FREE SERPL-MCNC: 2.06 MG/DL (ref 0.57–2.63)
PROT SERPL-MCNC: 7 G/DL (ref 6.4–8.2)

## 2022-09-28 RX ORDER — MONTELUKAST SODIUM 10 MG/1
TABLET ORAL
Qty: 90 TABLET | Refills: 0 | Status: SHIPPED | OUTPATIENT
Start: 2022-09-28

## 2022-09-30 ENCOUNTER — HOSPITAL ENCOUNTER (OUTPATIENT)
Dept: MAMMOGRAPHY | Facility: HOSPITAL | Age: 47
Discharge: HOME | End: 2022-09-30
Attending: FAMILY MEDICINE
Payer: COMMERCIAL

## 2022-09-30 DIAGNOSIS — R92.1 BREAST CALCIFICATIONS: ICD-10-CM

## 2022-09-30 PROCEDURE — 88305 TISSUE EXAM BY PATHOLOGIST: CPT | Performed by: FAMILY MEDICINE

## 2022-09-30 PROCEDURE — 19081 BX BREAST 1ST LESION STRTCTC: CPT | Performed by: FAMILY MEDICINE

## 2022-10-03 DIAGNOSIS — Z87.898 HISTORY OF ABNORMAL MAMMOGRAM: Primary | ICD-10-CM

## 2022-10-13 ENCOUNTER — PATIENT MESSAGE (OUTPATIENT)
Dept: FAMILY MEDICINE CLINIC | Facility: CLINIC | Age: 47
End: 2022-10-13

## 2022-10-13 NOTE — TELEPHONE ENCOUNTER
From: Miguel Conception  To: Adriana Rosenbaum MD  Sent: 10/13/2022 4:26 PM CDT  Subject: MRI Results    Hi Dr. Ventura Mcdaniel,     I had the brain MRI completed last week on Thursday at the [place] [address] in [place]. They said they would fax you the results by Saturday. I want to make sure you got them and if you can share the results with me? I also have a CD of the images I can bring to my next appointment.      Thanks again,  Waldo Gallego

## 2022-10-19 NOTE — TELEPHONE ENCOUNTER
The MRI of the brain was unremarkable.   If she is still having her symptoms I would like her to be seen by neurology

## 2022-10-21 ENCOUNTER — MED REC SCAN ONLY (OUTPATIENT)
Dept: FAMILY MEDICINE CLINIC | Facility: CLINIC | Age: 47
End: 2022-10-21

## 2022-10-24 NOTE — TELEPHONE ENCOUNTER
S/w patient regarding MRI results. Discussed f/u with neurology. [facility] sent with info to [facility] [address]. Damian Mckinleyam requests we send he a copy of the MRI resutls. She sts she has asked the imaging center for a copy of her results, and was told they are only able to send the results to the provider. I checked with  and Dimple Aguilar, they are unable to locate a copy of these results. Left message for triage to follow up on this on Tuesday 10/25. Please contact imaging center of [facility] in [place] and request them to fax us another copy of the results.

## 2022-10-25 NOTE — TELEPHONE ENCOUNTER
Call to imaging center of [address] [place] [address] reaches vvoice mail. Will call again tomorrow.

## 2022-10-26 NOTE — TELEPHONE ENCOUNTER
Call to imaging center of [facility]-requested cpy of recent MRI brain results faxed to my attention. She confirms request, sts will fax now.

## 2022-10-26 NOTE — TELEPHONE ENCOUNTER
10/06/22 MRI brain report received from imaging centers of [place]. Call to pt-advised have copy of MBI brain results from imaging center [facility]. She requests we mail to her home address-confirmed address. Advised will mail today-may take up to a wk, as our mail goes to hospital and then out to regular mail. Patient voices understanding/agrees with plan/no further questions.

## 2022-11-01 ENCOUNTER — OFFICE VISIT (OUTPATIENT)
Dept: FAMILY MEDICINE CLINIC | Facility: CLINIC | Age: 47
End: 2022-11-01
Payer: COMMERCIAL

## 2022-11-01 VITALS
DIASTOLIC BLOOD PRESSURE: 70 MMHG | WEIGHT: 225 LBS | HEART RATE: 80 BPM | TEMPERATURE: 98 F | HEIGHT: 69 IN | SYSTOLIC BLOOD PRESSURE: 118 MMHG | RESPIRATION RATE: 16 BRPM | BODY MASS INDEX: 33.33 KG/M2

## 2022-11-01 DIAGNOSIS — E66.09 CLASS 1 OBESITY DUE TO EXCESS CALORIES WITHOUT SERIOUS COMORBIDITY WITH BODY MASS INDEX (BMI) OF 33.0 TO 33.9 IN ADULT: Primary | ICD-10-CM

## 2022-11-01 DIAGNOSIS — Z71.3 WEIGHT LOSS COUNSELING, ENCOUNTER FOR: ICD-10-CM

## 2022-11-01 PROCEDURE — 3078F DIAST BP <80 MM HG: CPT | Performed by: FAMILY MEDICINE

## 2022-11-01 PROCEDURE — 3008F BODY MASS INDEX DOCD: CPT | Performed by: FAMILY MEDICINE

## 2022-11-01 PROCEDURE — 3074F SYST BP LT 130 MM HG: CPT | Performed by: FAMILY MEDICINE

## 2022-11-01 PROCEDURE — 99214 OFFICE O/P EST MOD 30 MIN: CPT | Performed by: FAMILY MEDICINE

## 2022-11-04 ENCOUNTER — MED REC SCAN ONLY (OUTPATIENT)
Dept: FAMILY MEDICINE CLINIC | Facility: CLINIC | Age: 47
End: 2022-11-04

## 2022-11-05 ENCOUNTER — IMMUNIZATION (OUTPATIENT)
Dept: LAB | Age: 47
End: 2022-11-05
Attending: EMERGENCY MEDICINE
Payer: COMMERCIAL

## 2022-11-05 DIAGNOSIS — Z23 NEED FOR VACCINATION: Primary | ICD-10-CM

## 2022-11-05 PROCEDURE — 90471 IMMUNIZATION ADMIN: CPT

## 2022-11-05 PROCEDURE — 90686 IIV4 VACC NO PRSV 0.5 ML IM: CPT

## 2022-11-05 PROCEDURE — 0134A SARSCOV2 VAC BVL 50MCG/0.5ML: CPT

## 2022-11-05 RX ORDER — PHENTERMINE HYDROCHLORIDE 30 MG/1
30 CAPSULE ORAL EVERY MORNING
Qty: 30 CAPSULE | Refills: 5 | Status: SHIPPED | OUTPATIENT
Start: 2022-11-01

## 2022-11-08 ENCOUNTER — TELEMEDICINE (OUTPATIENT)
Dept: INTERNAL MEDICINE CLINIC | Facility: CLINIC | Age: 47
End: 2022-11-08
Payer: COMMERCIAL

## 2022-11-08 ENCOUNTER — PATIENT MESSAGE (OUTPATIENT)
Dept: FAMILY MEDICINE CLINIC | Facility: CLINIC | Age: 47
End: 2022-11-08

## 2022-11-08 DIAGNOSIS — K90.0 CELIAC DISEASE: ICD-10-CM

## 2022-11-08 DIAGNOSIS — E88.81 INSULIN RESISTANCE: ICD-10-CM

## 2022-11-08 DIAGNOSIS — E66.9 CLASS 1 OBESITY WITH SERIOUS COMORBIDITY AND BODY MASS INDEX (BMI) OF 32.0 TO 32.9 IN ADULT, UNSPECIFIED OBESITY TYPE: ICD-10-CM

## 2022-11-08 DIAGNOSIS — Z51.81 ENCOUNTER FOR THERAPEUTIC DRUG MONITORING: Primary | ICD-10-CM

## 2022-11-09 RX ORDER — PHENTERMINE HYDROCHLORIDE 15 MG/1
15 CAPSULE ORAL 2 TIMES DAILY
Qty: 60 CAPSULE | Refills: 0 | Status: SHIPPED | OUTPATIENT
Start: 2022-11-09

## 2022-11-09 RX ORDER — PHENTERMINE HYDROCHLORIDE 37.5 MG/1
37.5 CAPSULE ORAL EVERY MORNING
Qty: 30 CAPSULE | Refills: 1
Start: 2022-11-09

## 2022-11-09 NOTE — TELEPHONE ENCOUNTER
Spoke with the pt and the pharmacist and per the pharmacist Phentermine 30 mg is on backorder and has been for a while. They do have the 37.5 mg on hand. The pt stated that she is ok with taking the 37.5 mg if necessary.

## 2022-11-09 NOTE — TELEPHONE ENCOUNTER
Per the pt she stated that she has been taking 2 tabs of the 15 mg, and the plan was to continue this dose until the end of November. She did inform me that she does have enough of the 15mg 2 tabs daily to last until the end of November. Per the pt she stated that the plan was to start the 37.5mg dose in December.

## 2022-11-09 NOTE — TELEPHONE ENCOUNTER
Have her take phentermine 15 mg 2 capsules daily for 1 month to be sure she tolerates that particular dose.   If she does we will then increase her to 37.5

## 2022-11-11 PROBLEM — E66.811 CLASS 1 OBESITY WITH SERIOUS COMORBIDITY AND BODY MASS INDEX (BMI) OF 32.0 TO 32.9 IN ADULT: Status: ACTIVE | Noted: 2022-11-11

## 2022-11-11 PROBLEM — E88.819 INSULIN RESISTANCE: Status: ACTIVE | Noted: 2022-11-11

## 2022-11-11 PROBLEM — E88.81 INSULIN RESISTANCE: Status: ACTIVE | Noted: 2022-11-11

## 2022-11-11 PROBLEM — Z51.81 ENCOUNTER FOR THERAPEUTIC DRUG MONITORING: Status: ACTIVE | Noted: 2022-11-11

## 2022-11-11 PROBLEM — E66.9 CLASS 1 OBESITY WITH SERIOUS COMORBIDITY AND BODY MASS INDEX (BMI) OF 32.0 TO 32.9 IN ADULT: Status: ACTIVE | Noted: 2022-11-11

## 2022-11-28 ENCOUNTER — PATIENT MESSAGE (OUTPATIENT)
Dept: FAMILY MEDICINE CLINIC | Facility: CLINIC | Age: 47
End: 2022-11-28

## 2022-11-28 DIAGNOSIS — E88.81 INSULIN RESISTANCE: Primary | ICD-10-CM

## 2022-11-28 DIAGNOSIS — E66.09 CLASS 1 OBESITY DUE TO EXCESS CALORIES WITHOUT SERIOUS COMORBIDITY WITH BODY MASS INDEX (BMI) OF 33.0 TO 33.9 IN ADULT: ICD-10-CM

## 2022-12-01 RX ORDER — PHENTERMINE HYDROCHLORIDE 37.5 MG/1
37.5 CAPSULE ORAL EVERY MORNING
Qty: 30 CAPSULE | Refills: 1 | Status: SHIPPED | OUTPATIENT
Start: 2022-12-01 | End: 2023-01-10

## 2022-12-01 NOTE — TELEPHONE ENCOUNTER
Change in vision from diabetes results when there are abrupt changes in blood sugar readings. I would think would be unlikely to be the cause. Finally go ahead and check another CMP and hemoglobin A1c anyway. Okay for the patient start the 1 mg dose of Ozempic and update me again in 1 month.   I am unaware of what the hold-up is on her phentermine 37.5

## 2022-12-01 NOTE — TELEPHONE ENCOUNTER
I called and notified pt of your recommendations. She will have a CMP and a HGBA1C in the next few days. She  would like to get a refill on the phentermine but at the 37.5 mg dose. Rx pended to you for authorization.  Please confirm dose of Ozempic prior to authorizing it

## 2022-12-06 ENCOUNTER — VIRTUAL PHONE E/M (OUTPATIENT)
Dept: UROLOGY | Facility: CLINIC | Age: 47
End: 2022-12-06
Attending: OBSTETRICS & GYNECOLOGY
Payer: COMMERCIAL

## 2022-12-06 DIAGNOSIS — N95.2 POSTMENOPAUSAL ATROPHIC VAGINITIS: ICD-10-CM

## 2022-12-06 DIAGNOSIS — Z98.890 POST-OPERATIVE STATE: ICD-10-CM

## 2022-12-06 DIAGNOSIS — N39.41 URGE INCONTINENCE: ICD-10-CM

## 2022-12-06 DIAGNOSIS — N39.0 FREQUENT UTI: Primary | ICD-10-CM

## 2022-12-06 RX ORDER — ESTRADIOL 0.1 MG/G
CREAM VAGINAL
Qty: 1 EACH | Refills: 3 | Status: SHIPPED | OUTPATIENT
Start: 2022-12-06

## 2022-12-06 NOTE — PROGRESS NOTES
Patient to follow up UTIs, UUI  Given circumstances surrounding COVID-19 this visit is being conducted as a televisit with pt's consent. Pt in safe, private environment for televisit, provider located in the office setting    She is currently using myrbetriq 50mg daily  Had been taking oxybutynin    She reports +improvement   Less leakage  No SE  expensive    H/o UTIs - increased from NTF with coitus to NTF 50mg at bedtime x6 mo w/o UTI  Happy    Vag estrogen twice weekly    Bowels reg    Lost 20#    There were no vitals taken for this visit. Impression/Plan:  (N39.0) Frequent UTI  (primary encounter diagnosis)    (Z98.890) Post-operative state    (N39.41) Urge incontinence    (N95.2) Postmenopausal atrophic vaginitis  Plan: estradiol (ESTRACE) 0.1 MG/GM Vaginal Cream        Cont myrbetriq 50mg daily - work on cost savings  Cont vag estrogen twice weekly  H/o UTIs  Discussed management of recurrent urinary tract infections. Discussed use of pharmacologic treatment options for suppression therapy. Risks vs benefits reviewed with patient     Taper NTF to every other night  Call with s/sx of UTI    All questions answered  She understands and agrees to plan    Return in about 3 months (around 3/6/2023).     Padmini Gil, DO, FACOG, FACS

## 2022-12-07 ENCOUNTER — LAB ENCOUNTER (OUTPATIENT)
Dept: LAB | Age: 47
End: 2022-12-07
Attending: FAMILY MEDICINE
Payer: COMMERCIAL

## 2022-12-07 ENCOUNTER — MED REC SCAN ONLY (OUTPATIENT)
Dept: FAMILY MEDICINE CLINIC | Facility: CLINIC | Age: 47
End: 2022-12-07

## 2022-12-07 DIAGNOSIS — E88.81 INSULIN RESISTANCE: ICD-10-CM

## 2022-12-07 DIAGNOSIS — R74.8 ELEVATED LIVER ENZYMES: Primary | ICD-10-CM

## 2022-12-07 LAB
ALBUMIN SERPL-MCNC: 3.2 G/DL (ref 3.4–5)
ALBUMIN/GLOB SERPL: 0.8 {RATIO} (ref 1–2)
ALP LIVER SERPL-CCNC: 140 U/L
ALT SERPL-CCNC: 128 U/L
ANION GAP SERPL CALC-SCNC: 8 MMOL/L (ref 0–18)
AST SERPL-CCNC: 49 U/L (ref 15–37)
BILIRUB SERPL-MCNC: 0.2 MG/DL (ref 0.1–2)
BUN BLD-MCNC: 11 MG/DL (ref 7–18)
BUN/CREAT SERPL: 12.9 (ref 10–20)
CALCIUM BLD-MCNC: 9.3 MG/DL (ref 8.5–10.1)
CHLORIDE SERPL-SCNC: 106 MMOL/L (ref 98–112)
CO2 SERPL-SCNC: 27 MMOL/L (ref 21–32)
CREAT BLD-MCNC: 0.85 MG/DL
EST. AVERAGE GLUCOSE BLD GHB EST-MCNC: 100 MG/DL (ref 68–126)
FASTING STATUS PATIENT QL REPORTED: YES
GFR SERPLBLD BASED ON 1.73 SQ M-ARVRAT: 85 ML/MIN/1.73M2 (ref 60–?)
GLOBULIN PLAS-MCNC: 3.9 G/DL (ref 2.8–4.4)
GLUCOSE BLD-MCNC: 89 MG/DL (ref 70–99)
HBA1C MFR BLD: 5.1 % (ref ?–5.7)
OSMOLALITY SERPL CALC.SUM OF ELEC: 291 MOSM/KG (ref 275–295)
POTASSIUM SERPL-SCNC: 4.6 MMOL/L (ref 3.5–5.1)
PROT SERPL-MCNC: 7.1 G/DL (ref 6.4–8.2)
SODIUM SERPL-SCNC: 141 MMOL/L (ref 136–145)

## 2022-12-07 PROCEDURE — 83036 HEMOGLOBIN GLYCOSYLATED A1C: CPT | Performed by: FAMILY MEDICINE

## 2022-12-07 PROCEDURE — 80053 COMPREHEN METABOLIC PANEL: CPT | Performed by: FAMILY MEDICINE

## 2022-12-09 DIAGNOSIS — R74.8 ELEVATED LIVER ENZYMES: Primary | ICD-10-CM

## 2022-12-09 RX ORDER — METHYLPREDNISOLONE 4 MG/1
TABLET ORAL
Qty: 1 EACH | Refills: 0 | Status: SHIPPED | OUTPATIENT
Start: 2022-12-09

## 2022-12-23 RX ORDER — MONTELUKAST SODIUM 10 MG/1
TABLET ORAL
Qty: 90 TABLET | Refills: 0 | Status: SHIPPED | OUTPATIENT
Start: 2022-12-23

## 2022-12-29 ENCOUNTER — LAB ENCOUNTER (OUTPATIENT)
Dept: LAB | Age: 47
End: 2022-12-29
Attending: FAMILY MEDICINE
Payer: COMMERCIAL

## 2022-12-29 DIAGNOSIS — R74.8 ELEVATED LIVER ENZYMES: ICD-10-CM

## 2022-12-29 LAB
ALBUMIN SERPL-MCNC: 3 G/DL (ref 3.4–5)
ALP LIVER SERPL-CCNC: 103 U/L
ALT SERPL-CCNC: 98 U/L
AST SERPL-CCNC: 18 U/L (ref 15–37)
BILIRUB DIRECT SERPL-MCNC: 0.1 MG/DL (ref 0–0.2)
BILIRUB SERPL-MCNC: 0.4 MG/DL (ref 0.1–2)
HAV IGM SER QL: NONREACTIVE
HBV CORE IGM SER QL: NONREACTIVE
HBV SURFACE AG SERPL QL IA: NONREACTIVE
HCV AB SERPL QL IA: NONREACTIVE
PROT SERPL-MCNC: 6.4 G/DL (ref 6.4–8.2)

## 2022-12-29 PROCEDURE — 80074 ACUTE HEPATITIS PANEL: CPT | Performed by: FAMILY MEDICINE

## 2022-12-29 PROCEDURE — 80076 HEPATIC FUNCTION PANEL: CPT | Performed by: FAMILY MEDICINE

## 2022-12-30 ENCOUNTER — LAB ENCOUNTER (OUTPATIENT)
Dept: LAB | Age: 47
End: 2022-12-30
Attending: PHYSICIAN ASSISTANT
Payer: COMMERCIAL

## 2022-12-30 ENCOUNTER — TELEPHONE (OUTPATIENT)
Dept: UROLOGY | Facility: CLINIC | Age: 47
End: 2022-12-30

## 2022-12-30 DIAGNOSIS — R82.90 CLOUDY URINE: ICD-10-CM

## 2022-12-30 DIAGNOSIS — R82.90 CLOUDY URINE: Primary | ICD-10-CM

## 2022-12-30 PROCEDURE — 87086 URINE CULTURE/COLONY COUNT: CPT

## 2022-12-30 RX ORDER — SULFAMETHOXAZOLE AND TRIMETHOPRIM 800; 160 MG/1; MG/1
1 TABLET ORAL 2 TIMES DAILY
Qty: 14 TABLET | Refills: 0 | Status: SHIPPED | OUTPATIENT
Start: 2022-12-30

## 2022-12-30 NOTE — TELEPHONE ENCOUNTER
TC from pt today w/ c/o cloudy urine for last 3 days, dysuria yesterday and more urgency/frequency for last 2-3 days. Denies fever. pt reports at  on 12/6/22, Dr Casey Mata lowered her macrodantin to every other day. Has been doing this since. Last night pt took 4 macrodantin. Pt saying Dr Casey Mata told her \"to do this\" if she felt sx. Plan to get ucx done today at [facility] lab. Evangelista Lewis-bactrim DS BID x 7d. Will follow ucx. Pt to schedule televisit w/ Bhavna Simms to discuss suppression options.

## 2023-01-03 ENCOUNTER — VIRTUAL PHONE E/M (OUTPATIENT)
Dept: UROLOGY | Facility: CLINIC | Age: 48
End: 2023-01-03
Attending: PHYSICIAN ASSISTANT
Payer: COMMERCIAL

## 2023-01-03 VITALS — WEIGHT: 225 LBS | HEIGHT: 69 IN | BODY MASS INDEX: 33.33 KG/M2

## 2023-01-03 DIAGNOSIS — N95.2 POSTMENOPAUSAL ATROPHIC VAGINITIS: ICD-10-CM

## 2023-01-03 DIAGNOSIS — N39.0 FREQUENT UTI: Primary | ICD-10-CM

## 2023-01-03 DIAGNOSIS — N81.84 PELVIC MUSCLE WASTING: ICD-10-CM

## 2023-01-03 DIAGNOSIS — R74.8 ELEVATED LIVER ENZYMES: Primary | ICD-10-CM

## 2023-01-03 DIAGNOSIS — N39.41 URGE INCONTINENCE: ICD-10-CM

## 2023-01-03 PROCEDURE — 87624 HPV HI-RISK TYP POOLED RSLT: CPT | Performed by: OBSTETRICS & GYNECOLOGY

## 2023-01-07 ENCOUNTER — PATIENT MESSAGE (OUTPATIENT)
Dept: FAMILY MEDICINE CLINIC | Facility: CLINIC | Age: 48
End: 2023-01-07

## 2023-01-07 DIAGNOSIS — E66.09 CLASS 1 OBESITY DUE TO EXCESS CALORIES WITHOUT SERIOUS COMORBIDITY WITH BODY MASS INDEX (BMI) OF 33.0 TO 33.9 IN ADULT: ICD-10-CM

## 2023-01-09 ENCOUNTER — OFFICE VISIT (OUTPATIENT)
Dept: SURGERY | Facility: CLINIC | Age: 48
End: 2023-01-09
Payer: COMMERCIAL

## 2023-01-09 VITALS
SYSTOLIC BLOOD PRESSURE: 83 MMHG | OXYGEN SATURATION: 99 % | RESPIRATION RATE: 16 BRPM | HEIGHT: 69 IN | HEART RATE: 96 BPM | BODY MASS INDEX: 31.22 KG/M2 | WEIGHT: 210.81 LBS | DIASTOLIC BLOOD PRESSURE: 55 MMHG

## 2023-01-09 DIAGNOSIS — R92.8 ABNORMAL MAMMOGRAM OF BOTH BREASTS: Primary | ICD-10-CM

## 2023-01-09 NOTE — TELEPHONE ENCOUNTER
She could either take 1.7 mg without phentermine, or restart the phentermine and go down to 0.5 mg Ozempic. Which would she like to do?

## 2023-01-10 RX ORDER — PHENTERMINE HYDROCHLORIDE 37.5 MG/1
37.5 CAPSULE ORAL EVERY MORNING
Qty: 30 CAPSULE | Refills: 1 | Status: SHIPPED | OUTPATIENT
Start: 2023-01-10

## 2023-01-10 NOTE — TELEPHONE ENCOUNTER
[facility] sent to pt with Dr. Jacquelyn De La O recommendations below. Please authorize phentermine for pt.

## 2023-02-08 ENCOUNTER — MED REC SCAN ONLY (OUTPATIENT)
Dept: FAMILY MEDICINE CLINIC | Facility: CLINIC | Age: 48
End: 2023-02-08

## 2023-02-14 ENCOUNTER — TELEPHONE (OUTPATIENT)
Dept: FAMILY MEDICINE CLINIC | Facility: CLINIC | Age: 48
End: 2023-02-14

## 2023-02-14 ENCOUNTER — OFFICE VISIT (OUTPATIENT)
Dept: FAMILY MEDICINE CLINIC | Facility: CLINIC | Age: 48
End: 2023-02-14
Payer: COMMERCIAL

## 2023-02-14 ENCOUNTER — LABORATORY ENCOUNTER (OUTPATIENT)
Dept: LAB | Age: 48
End: 2023-02-14
Attending: FAMILY MEDICINE
Payer: COMMERCIAL

## 2023-02-14 VITALS
RESPIRATION RATE: 16 BRPM | SYSTOLIC BLOOD PRESSURE: 98 MMHG | TEMPERATURE: 98 F | DIASTOLIC BLOOD PRESSURE: 62 MMHG | BODY MASS INDEX: 30.66 KG/M2 | HEIGHT: 69 IN | WEIGHT: 207 LBS | HEART RATE: 112 BPM

## 2023-02-14 DIAGNOSIS — R74.8 ELEVATED ALKALINE PHOSPHATASE LEVEL: Primary | ICD-10-CM

## 2023-02-14 DIAGNOSIS — R74.8 ELEVATED LIVER ENZYMES: ICD-10-CM

## 2023-02-14 DIAGNOSIS — M31.30 GRANULOMATOSIS WITH POLYANGIITIS, UNSPECIFIED WHETHER RENAL INVOLVEMENT (HCC): ICD-10-CM

## 2023-02-14 DIAGNOSIS — E66.09 CLASS 1 OBESITY DUE TO EXCESS CALORIES WITHOUT SERIOUS COMORBIDITY WITH BODY MASS INDEX (BMI) OF 33.0 TO 33.9 IN ADULT: Primary | ICD-10-CM

## 2023-02-14 LAB
ALBUMIN SERPL-MCNC: 3.5 G/DL (ref 3.4–5)
ALP LIVER SERPL-CCNC: 123 U/L
ALT SERPL-CCNC: 33 U/L
AST SERPL-CCNC: 18 U/L (ref 15–37)
BILIRUB DIRECT SERPL-MCNC: <0.1 MG/DL (ref 0–0.2)
BILIRUB SERPL-MCNC: 0.2 MG/DL (ref 0.1–2)
PROT SERPL-MCNC: 7.4 G/DL (ref 6.4–8.2)

## 2023-02-14 PROCEDURE — 99214 OFFICE O/P EST MOD 30 MIN: CPT | Performed by: FAMILY MEDICINE

## 2023-02-14 PROCEDURE — 3008F BODY MASS INDEX DOCD: CPT | Performed by: FAMILY MEDICINE

## 2023-02-14 PROCEDURE — 3074F SYST BP LT 130 MM HG: CPT | Performed by: FAMILY MEDICINE

## 2023-02-14 PROCEDURE — 3078F DIAST BP <80 MM HG: CPT | Performed by: FAMILY MEDICINE

## 2023-02-14 PROCEDURE — 80076 HEPATIC FUNCTION PANEL: CPT

## 2023-02-14 RX ORDER — SEMAGLUTIDE 1.34 MG/ML
1 INJECTION, SOLUTION SUBCUTANEOUS
COMMUNITY
Start: 2023-01-12 | End: 2023-02-14

## 2023-02-14 RX ORDER — PHENTERMINE HYDROCHLORIDE 15 MG/1
15 CAPSULE ORAL EVERY MORNING
COMMUNITY

## 2023-02-14 RX ORDER — SEMAGLUTIDE 1.34 MG/ML
2 INJECTION, SOLUTION SUBCUTANEOUS WEEKLY
Qty: 2 EACH | Refills: 3 | Status: SHIPPED | OUTPATIENT
Start: 2023-02-14 | End: 2023-02-14

## 2023-02-14 NOTE — TELEPHONE ENCOUNTER
LM for pt to cb. Dr Eugenie Najjar rec'd a fax from [facility] for the ozempic he sent today. Per [facility] pt insurance will not cover more than one pen a month. Asks this to be changed to the 8mg pen to equate her using 2mg a week (dose adjustment as of today). Dr Eugenie Najjar has sent the 8mg pen but we are told it is on back order. I am in process of seeing if we can obtain samples but in the meantime pt can see if other pharmacies have the 8mg pen? The other option is if she picks up the original rx Dr Eugenie Najjar sent and pays out of pocket for the other pen? ( sent qty of 2 of the 4mg)    See how she wishes to proceed. Thanks.

## 2023-02-16 NOTE — TELEPHONE ENCOUNTER
Pt informed of below and she voiced understanding. Pt states she will go ahead and call around few pharmacies and will cb with info. Also informed of lab result done 2/14/2023 with recommendations and she voiced understanding.

## 2023-02-17 ENCOUNTER — PATIENT MESSAGE (OUTPATIENT)
Dept: FAMILY MEDICINE CLINIC | Facility: CLINIC | Age: 48
End: 2023-02-17

## 2023-02-17 NOTE — TELEPHONE ENCOUNTER
S/w Beatrice at [facility] requested ozempic Rx to be cancelled since they do not have this medication. Rx sent to [facility]. [facility] notification sent to the pt.

## 2023-02-17 NOTE — TELEPHONE ENCOUNTER
From: Coleen Roche  To: Yessica Turner MD  Sent: 2/17/2023 1:34 PM CST  Subject: Sisi Posadad,    I called around and found a pharmacy that can fill the 2.0 dose of Ozempic. It is called the [address] [place] [address]. Here is a picture of their contact information. Can you send my prescription to them today? If so, I should be able to get it on Tuesday of next week. Thank you!!      Justin Silva

## 2023-02-17 NOTE — TELEPHONE ENCOUNTER
S/w Nickie Tate      She verified she requesting to cancel Rx at [facility] and send to Lianet Reyes at [facility]. Silvia Roblero at [phone].

## 2023-03-03 ENCOUNTER — PATIENT MESSAGE (OUTPATIENT)
Dept: FAMILY MEDICINE CLINIC | Facility: CLINIC | Age: 48
End: 2023-03-03

## 2023-03-03 NOTE — TELEPHONE ENCOUNTER
Dr. Jazzy Bhandari please review message below. Is Dr. Clary Guthrie the rheumatologist you were recommending to pt?

## 2023-03-10 ENCOUNTER — HOSPITAL ENCOUNTER (OUTPATIENT)
Dept: MAMMOGRAPHY | Facility: HOSPITAL | Age: 48
Discharge: HOME | End: 2023-03-10
Attending: SURGERY
Payer: COMMERCIAL

## 2023-03-10 DIAGNOSIS — R92.8 ABNORMAL MAMMOGRAM OF BOTH BREASTS: ICD-10-CM

## 2023-03-10 PROCEDURE — 77066 DX MAMMO INCL CAD BI: CPT | Performed by: SURGERY

## 2023-03-10 PROCEDURE — 76642 ULTRASOUND BREAST LIMITED: CPT | Performed by: SURGERY

## 2023-03-10 PROCEDURE — 76641 ULTRASOUND BREAST COMPLETE: CPT | Performed by: SURGERY

## 2023-03-10 PROCEDURE — 77062 BREAST TOMOSYNTHESIS BI: CPT | Performed by: SURGERY

## 2023-03-14 ENCOUNTER — VIRTUAL PHONE E/M (OUTPATIENT)
Dept: UROLOGY | Facility: CLINIC | Age: 48
End: 2023-03-14
Attending: OBSTETRICS & GYNECOLOGY
Payer: COMMERCIAL

## 2023-03-14 VITALS — BODY MASS INDEX: 30.66 KG/M2 | HEIGHT: 69 IN | WEIGHT: 207 LBS

## 2023-03-14 DIAGNOSIS — N39.0 FREQUENT UTI: Primary | ICD-10-CM

## 2023-03-14 DIAGNOSIS — N39.41 URGE INCONTINENCE: ICD-10-CM

## 2023-03-14 DIAGNOSIS — N95.2 POSTMENOPAUSAL ATROPHIC VAGINITIS: ICD-10-CM

## 2023-03-19 ENCOUNTER — PATIENT MESSAGE (OUTPATIENT)
Dept: FAMILY MEDICINE CLINIC | Facility: CLINIC | Age: 48
End: 2023-03-19

## 2023-03-20 RX ORDER — PHENTERMINE HYDROCHLORIDE 37.5 MG/1
37.5 TABLET ORAL
Qty: 30 TABLET | Refills: 0 | Status: SHIPPED | OUTPATIENT
Start: 2023-03-20

## 2023-03-20 NOTE — TELEPHONE ENCOUNTER
The refill for Ozempic was pended to you. Please authorize the refill for the phentermine 37.5 mg.  Thank you

## 2023-03-28 RX ORDER — MONTELUKAST SODIUM 10 MG/1
TABLET ORAL
Qty: 90 TABLET | Refills: 0 | Status: SHIPPED | OUTPATIENT
Start: 2023-03-28

## 2023-03-29 ENCOUNTER — LAB ENCOUNTER (OUTPATIENT)
Dept: LAB | Age: 48
End: 2023-03-29
Attending: FAMILY MEDICINE
Payer: COMMERCIAL

## 2023-03-29 DIAGNOSIS — R74.8 ELEVATED ALKALINE PHOSPHATASE LEVEL: ICD-10-CM

## 2023-03-29 PROCEDURE — 84080 ASSAY ALKALINE PHOSPHATASES: CPT | Performed by: FAMILY MEDICINE

## 2023-03-29 PROCEDURE — 84075 ASSAY ALKALINE PHOSPHATASE: CPT | Performed by: FAMILY MEDICINE

## 2023-04-03 LAB
ALK-PHOSPHATASE BONE CALC: 48 U/L
ALK-PHOSPHATASE LIVER CALC: 86 U/L
ALK-PHOSPHATASE OTHER CALC: 0 U/L
ALKALINE PHOSPHATASE: 134 U/L

## 2023-04-04 NOTE — TELEPHONE ENCOUNTER
04/04 LMTCB     Pt needing a PA for phentermine 37.5 mg according to [facility]. She can get this med if she uses [facility] coupon for as little as $11.87 for a 30 day supply at [facility]. She can still use [facility] if she would like and it will cost her $ 16.27. Please let Dr. Keiko Macias know what she would prefer.

## 2023-04-05 NOTE — TELEPHONE ENCOUNTER
Pt called back on this. Discussed below. Wondering why a PA is needed as she has not had issue filling in past. She said her insurance tells her that after 2x at local pharmacy needs to go to mail order. sts she does not need a refill at the moment but when she does, would [facility].  I asked her to cb when she needs and she can use coupon if needed after we [facility] to [facility].    Also is asking for refill on her ozempic, reports her previous request was denied and is wondering why>? I advised this may have been oversight as we sent to local pharm previously. Asking rx to [facility]. Rx pended.

## 2023-05-18 ENCOUNTER — OFFICE VISIT (OUTPATIENT)
Dept: FAMILY MEDICINE CLINIC | Facility: CLINIC | Age: 48
End: 2023-05-18
Payer: COMMERCIAL

## 2023-05-18 VITALS
HEART RATE: 88 BPM | RESPIRATION RATE: 16 BRPM | TEMPERATURE: 98 F | WEIGHT: 194 LBS | DIASTOLIC BLOOD PRESSURE: 62 MMHG | BODY MASS INDEX: 28.73 KG/M2 | HEIGHT: 69 IN | SYSTOLIC BLOOD PRESSURE: 98 MMHG

## 2023-05-18 DIAGNOSIS — E66.3 OVERWEIGHT (BMI 25.0-29.9): Primary | ICD-10-CM

## 2023-05-18 DIAGNOSIS — K90.0 CELIAC DISEASE: ICD-10-CM

## 2023-05-18 DIAGNOSIS — M31.30 GRANULOMATOSIS WITH POLYANGIITIS WITHOUT RENAL INVOLVEMENT (HCC): ICD-10-CM

## 2023-05-18 DIAGNOSIS — R91.8 PULMONARY NODULES: ICD-10-CM

## 2023-05-18 PROCEDURE — 3008F BODY MASS INDEX DOCD: CPT | Performed by: FAMILY MEDICINE

## 2023-05-18 PROCEDURE — 99214 OFFICE O/P EST MOD 30 MIN: CPT | Performed by: FAMILY MEDICINE

## 2023-05-18 PROCEDURE — 3078F DIAST BP <80 MM HG: CPT | Performed by: FAMILY MEDICINE

## 2023-05-18 PROCEDURE — 3074F SYST BP LT 130 MM HG: CPT | Performed by: FAMILY MEDICINE

## 2023-05-18 RX ORDER — CELECOXIB 100 MG/1
100 CAPSULE ORAL 2 TIMES DAILY
COMMUNITY
Start: 2023-04-17

## 2023-05-18 RX ORDER — MIRABEGRON 50 MG/1
TABLET, FILM COATED, EXTENDED RELEASE ORAL
COMMUNITY
Start: 2023-03-27

## 2023-05-19 ENCOUNTER — LAB ENCOUNTER (OUTPATIENT)
Dept: LAB | Age: 48
End: 2023-05-19
Attending: FAMILY MEDICINE
Payer: COMMERCIAL

## 2023-05-19 ENCOUNTER — TELEPHONE (OUTPATIENT)
Dept: FAMILY MEDICINE CLINIC | Facility: CLINIC | Age: 48
End: 2023-05-19

## 2023-05-19 DIAGNOSIS — E88.81 INSULIN RESISTANCE: ICD-10-CM

## 2023-05-19 DIAGNOSIS — E88.81 INSULIN RESISTANCE: Primary | ICD-10-CM

## 2023-05-19 LAB
ALBUMIN SERPL-MCNC: 2.9 G/DL (ref 3.4–5)
ALBUMIN/GLOB SERPL: 0.6 {RATIO} (ref 1–2)
ALP LIVER SERPL-CCNC: 107 U/L
ALT SERPL-CCNC: 150 U/L
ANION GAP SERPL CALC-SCNC: 7 MMOL/L (ref 0–18)
AST SERPL-CCNC: 53 U/L (ref 15–37)
BILIRUB SERPL-MCNC: 0.4 MG/DL (ref 0.1–2)
BUN BLD-MCNC: 7 MG/DL (ref 7–18)
CALCIUM BLD-MCNC: 9.5 MG/DL (ref 8.5–10.1)
CHLORIDE SERPL-SCNC: 105 MMOL/L (ref 98–112)
CHOLEST SERPL-MCNC: 171 MG/DL (ref ?–200)
CO2 SERPL-SCNC: 26 MMOL/L (ref 21–32)
CREAT BLD-MCNC: 0.77 MG/DL
EST. AVERAGE GLUCOSE BLD GHB EST-MCNC: 108 MG/DL (ref 68–126)
FASTING PATIENT LIPID ANSWER: YES
FASTING STATUS PATIENT QL REPORTED: YES
GFR SERPLBLD BASED ON 1.73 SQ M-ARVRAT: 96 ML/MIN/1.73M2 (ref 60–?)
GLOBULIN PLAS-MCNC: 4.7 G/DL (ref 2.8–4.4)
GLUCOSE BLD-MCNC: 87 MG/DL (ref 70–99)
HBA1C MFR BLD: 5.4 % (ref ?–5.7)
HDLC SERPL-MCNC: 46 MG/DL (ref 40–59)
LDLC SERPL CALC-MCNC: 106 MG/DL (ref ?–100)
NONHDLC SERPL-MCNC: 125 MG/DL (ref ?–130)
OSMOLALITY SERPL CALC.SUM OF ELEC: 283 MOSM/KG (ref 275–295)
POTASSIUM SERPL-SCNC: 4.1 MMOL/L (ref 3.5–5.1)
PROT SERPL-MCNC: 7.6 G/DL (ref 6.4–8.2)
SODIUM SERPL-SCNC: 138 MMOL/L (ref 136–145)
TRIGL SERPL-MCNC: 103 MG/DL (ref 30–149)
VLDLC SERPL CALC-MCNC: 17 MG/DL (ref 0–30)

## 2023-05-19 PROCEDURE — 80053 COMPREHEN METABOLIC PANEL: CPT

## 2023-05-19 PROCEDURE — 80061 LIPID PANEL: CPT

## 2023-05-19 PROCEDURE — 83036 HEMOGLOBIN GLYCOSYLATED A1C: CPT

## 2023-05-19 NOTE — TELEPHONE ENCOUNTER
Lab called. Pt is downstairs for labs but no orders? Pt stated she thought it would be a full panel and lipid? Pt is aware you are off today but that I will place order for cmp, lipid, A1c.      Sending as Christina Pérez

## 2023-05-20 DIAGNOSIS — R74.01 ELEVATED TRANSAMINASE LEVEL: Primary | ICD-10-CM

## 2023-05-22 ENCOUNTER — PATIENT MESSAGE (OUTPATIENT)
Dept: FAMILY MEDICINE CLINIC | Facility: CLINIC | Age: 48
End: 2023-05-22

## 2023-05-22 DIAGNOSIS — R77.1 ELEVATED SERUM GLOBULIN LEVEL: ICD-10-CM

## 2023-05-22 DIAGNOSIS — R79.89 ELEVATED LFTS: Primary | ICD-10-CM

## 2023-05-24 ENCOUNTER — OFFICE VISIT (OUTPATIENT)
Dept: NEUROLOGY | Facility: CLINIC | Age: 48
End: 2023-05-24
Payer: COMMERCIAL

## 2023-05-24 VITALS
BODY MASS INDEX: 29 KG/M2 | SYSTOLIC BLOOD PRESSURE: 116 MMHG | RESPIRATION RATE: 16 BRPM | WEIGHT: 193 LBS | HEART RATE: 68 BPM | DIASTOLIC BLOOD PRESSURE: 71 MMHG

## 2023-05-24 DIAGNOSIS — R20.2 PARESTHESIAS: Primary | ICD-10-CM

## 2023-05-24 PROCEDURE — 3078F DIAST BP <80 MM HG: CPT | Performed by: OTHER

## 2023-05-24 PROCEDURE — 99204 OFFICE O/P NEW MOD 45 MIN: CPT | Performed by: OTHER

## 2023-05-24 PROCEDURE — 3074F SYST BP LT 130 MM HG: CPT | Performed by: OTHER

## 2023-05-24 NOTE — PROGRESS NOTES
Pt reports numbness and tingling in arms and legs when limbs crossed or elevated. Pt reports she has noticed increased frequency over the last year. Pt reports she was getting headaches when she was increasing dosage of Ozempic which occurred for 3 weeks and headaches have generally subsided. Yes

## 2023-06-02 ENCOUNTER — LAB ENCOUNTER (OUTPATIENT)
Dept: LAB | Age: 48
End: 2023-06-02
Attending: Other
Payer: COMMERCIAL

## 2023-06-02 DIAGNOSIS — R74.01 ELEVATED TRANSAMINASE LEVEL: ICD-10-CM

## 2023-06-02 DIAGNOSIS — R77.1 ELEVATED SERUM GLOBULIN LEVEL: ICD-10-CM

## 2023-06-02 DIAGNOSIS — R20.2 PARESTHESIAS: ICD-10-CM

## 2023-06-02 LAB
HAV IGM SER QL: NONREACTIVE
HBV CORE IGM SER QL: NONREACTIVE
HBV SURFACE AG SERPL QL IA: NONREACTIVE
HCV AB SERPL QL IA: NONREACTIVE
VIT B12 SERPL-MCNC: 299 PG/ML (ref 193–986)

## 2023-06-02 PROCEDURE — 86334 IMMUNOFIX E-PHORESIS SERUM: CPT

## 2023-06-02 PROCEDURE — 84446 ASSAY OF VITAMIN E: CPT

## 2023-06-02 PROCEDURE — 83521 IG LIGHT CHAINS FREE EACH: CPT

## 2023-06-02 PROCEDURE — 82607 VITAMIN B-12: CPT

## 2023-06-02 PROCEDURE — 84165 PROTEIN E-PHORESIS SERUM: CPT

## 2023-06-02 PROCEDURE — 84207 ASSAY OF VITAMIN B-6: CPT

## 2023-06-02 PROCEDURE — 80074 ACUTE HEPATITIS PANEL: CPT

## 2023-06-05 LAB
VIT E ALPHA TOCO: 11.8 MG/L
VIT E GAMMA TOCO: 1.1 MG/L
VITAMIN B6: 3.4 UG/L

## 2023-06-07 LAB
ALBUMIN SERPL ELPH-MCNC: 3.18 G/DL (ref 3.75–5.21)
ALBUMIN/GLOB SERPL: 0.83 {RATIO} (ref 1–2)
ALPHA1 GLOB SERPL ELPH-MCNC: 0.48 G/DL (ref 0.19–0.46)
ALPHA2 GLOB SERPL ELPH-MCNC: 1.04 G/DL (ref 0.48–1.05)
B-GLOBULIN SERPL ELPH-MCNC: 1.22 G/DL (ref 0.68–1.23)
GAMMA GLOB SERPL ELPH-MCNC: 1.09 G/DL (ref 0.62–1.7)
KAPPA LC FREE SER-MCNC: 2.92 MG/DL (ref 0.33–1.94)
KAPPA LC FREE/LAMBDA FREE SER NEPH: 1.09 {RATIO} (ref 0.26–1.65)
LAMBDA LC FREE SERPL-MCNC: 2.68 MG/DL (ref 0.57–2.63)
PROT SERPL-MCNC: 7 G/DL (ref 6.4–8.2)

## 2023-06-26 RX ORDER — MONTELUKAST SODIUM 10 MG/1
TABLET ORAL
Qty: 30 TABLET | Refills: 0 | Status: SHIPPED | OUTPATIENT
Start: 2023-06-26

## 2023-06-27 ENCOUNTER — VIRTUAL PHONE E/M (OUTPATIENT)
Dept: UROLOGY | Facility: CLINIC | Age: 48
End: 2023-06-27
Attending: OBSTETRICS & GYNECOLOGY
Payer: COMMERCIAL

## 2023-06-27 ENCOUNTER — TELEPHONE (OUTPATIENT)
Dept: UROLOGY | Facility: CLINIC | Age: 48
End: 2023-06-27

## 2023-06-27 DIAGNOSIS — N39.0 FREQUENT UTI: Primary | ICD-10-CM

## 2023-06-27 DIAGNOSIS — N95.2 POSTMENOPAUSAL ATROPHIC VAGINITIS: ICD-10-CM

## 2023-06-27 DIAGNOSIS — N39.41 URGE INCONTINENCE: ICD-10-CM

## 2023-06-27 RX ORDER — MIRABEGRON 50 MG/1
50 TABLET, FILM COATED, EXTENDED RELEASE ORAL DAILY
Qty: 90 TABLET | Refills: 3 | Status: SHIPPED | OUTPATIENT
Start: 2023-06-27

## 2023-06-28 ENCOUNTER — MED REC SCAN ONLY (OUTPATIENT)
Dept: FAMILY MEDICINE CLINIC | Facility: CLINIC | Age: 48
End: 2023-06-28

## 2023-06-30 ENCOUNTER — OFFICE VISIT (OUTPATIENT)
Dept: RHEUMATOLOGY | Facility: CLINIC | Age: 48
End: 2023-06-30
Payer: COMMERCIAL

## 2023-06-30 VITALS
DIASTOLIC BLOOD PRESSURE: 70 MMHG | OXYGEN SATURATION: 98 % | HEART RATE: 97 BPM | SYSTOLIC BLOOD PRESSURE: 116 MMHG | RESPIRATION RATE: 16 BRPM | WEIGHT: 189 LBS | BODY MASS INDEX: 28 KG/M2 | TEMPERATURE: 98 F

## 2023-06-30 DIAGNOSIS — Z51.81 THERAPEUTIC DRUG MONITORING: ICD-10-CM

## 2023-06-30 DIAGNOSIS — J84.10 PULMONARY GRANULOMA (HCC): ICD-10-CM

## 2023-06-30 DIAGNOSIS — I77.82 ANCA-ASSOCIATED VASCULITIS (HCC): Primary | ICD-10-CM

## 2023-06-30 PROCEDURE — 99245 OFF/OP CONSLTJ NEW/EST HI 55: CPT | Performed by: INTERNAL MEDICINE

## 2023-06-30 PROCEDURE — 3074F SYST BP LT 130 MM HG: CPT | Performed by: INTERNAL MEDICINE

## 2023-06-30 PROCEDURE — 3078F DIAST BP <80 MM HG: CPT | Performed by: INTERNAL MEDICINE

## 2023-06-30 RX ORDER — SEMAGLUTIDE 0.68 MG/ML
INJECTION, SOLUTION SUBCUTANEOUS
COMMUNITY

## 2023-06-30 RX ORDER — PHENTERMINE HYDROCHLORIDE 37.5 MG/1
1 CAPSULE ORAL EVERY MORNING
COMMUNITY

## 2023-06-30 RX ORDER — PHENTERMINE HYDROCHLORIDE 15 MG/1
CAPSULE ORAL
COMMUNITY

## 2023-07-04 ENCOUNTER — PATIENT MESSAGE (OUTPATIENT)
Dept: OTOLARYNGOLOGY | Facility: CLINIC | Age: 48
End: 2023-07-04

## 2023-07-07 ENCOUNTER — OFFICE VISIT (OUTPATIENT)
Dept: AUDIOLOGY | Facility: CLINIC | Age: 48
End: 2023-07-07

## 2023-07-07 DIAGNOSIS — H69.93 DYSFUNCTION OF BOTH EUSTACHIAN TUBES: Primary | ICD-10-CM

## 2023-07-07 PROCEDURE — V5264 EAR MOLD/INSERT: HCPCS | Performed by: AUDIOLOGIST

## 2023-07-07 NOTE — PROGRESS NOTES
Rayne Champion is a 52year old female     Referring Provider: No ref. provider found   YOB: 1975  Medical Record: [id number]      Swimplugs/Earplugs      Patient wishes to obtain custom earmolds for sleeping due to 's snoring. Patient chose to obtain  custom molded swimplugs. Earmold impressions were taken without difficulty and custom sleep plugs ordered from [employer]. Patient chose colors:  Pastel pink. Solid color    Charges were entered for $160 today. Patient will be contacted once earmolds are back from  to set up an appointment for . Naomi Lovelace   Audiologist

## 2023-07-14 ENCOUNTER — AUDIOLOGY DOCUMENTATION (OUTPATIENT)
Dept: AUDIOLOGY | Facility: CLINIC | Age: 48
End: 2023-07-14

## 2023-07-14 NOTE — PROGRESS NOTES
Left voice mail message that sleep plugs are ready for . Can  at  at her convenience during office hours. If she prefers to schedule a time to  and check the fit, she should leave message so I can get back to her with possible times.

## 2023-07-17 ENCOUNTER — OFFICE VISIT (OUTPATIENT)
Dept: HEMATOLOGY/ONCOLOGY | Facility: HOSPITAL | Age: 48
End: 2023-07-17
Attending: INTERNAL MEDICINE
Payer: COMMERCIAL

## 2023-07-17 VITALS
TEMPERATURE: 98 F | OXYGEN SATURATION: 98 % | WEIGHT: 190 LBS | DIASTOLIC BLOOD PRESSURE: 74 MMHG | BODY MASS INDEX: 28 KG/M2 | SYSTOLIC BLOOD PRESSURE: 113 MMHG | HEART RATE: 107 BPM | RESPIRATION RATE: 16 BRPM

## 2023-07-17 DIAGNOSIS — M31.30 GRANULOMATOSIS WITH POLYANGIITIS WITHOUT RENAL INVOLVEMENT (HCC): ICD-10-CM

## 2023-07-17 DIAGNOSIS — K90.0 CELIAC DISEASE: ICD-10-CM

## 2023-07-17 DIAGNOSIS — R76.8 ELEVATED ANTI-TISSUE TRANSGLUTAMINASE (TTG) IGA LEVEL: ICD-10-CM

## 2023-07-17 DIAGNOSIS — R76.8 ELEVATED SERUM IMMUNOGLOBULIN FREE LIGHT CHAIN LEVEL: Primary | ICD-10-CM

## 2023-07-17 LAB
IGA SERPL-MCNC: 534 MG/DL (ref 70–312)
IGM SERPL-MCNC: 80.8 MG/DL (ref 43–279)
IMMUNOGLOBULIN PNL SER-MCNC: 905 MG/DL (ref 791–1643)

## 2023-07-17 PROCEDURE — 99245 OFF/OP CONSLTJ NEW/EST HI 55: CPT | Performed by: INTERNAL MEDICINE

## 2023-07-17 NOTE — PATIENT INSTRUCTIONS
For triage nurse: [phone] Monday through Friday 7:30-5:00.  *Please note this is a new phone number*    After hours or weekends for emergent needs:  [phone].      To schedule diagnostic testing: Central [phone]    For Medical Records: [phone]

## 2023-07-20 ENCOUNTER — LAB ENCOUNTER (OUTPATIENT)
Dept: LAB | Facility: HOSPITAL | Age: 48
End: 2023-07-20
Attending: INTERNAL MEDICINE
Payer: COMMERCIAL

## 2023-07-20 DIAGNOSIS — R76.8 ELEVATED SERUM IMMUNOGLOBULIN FREE LIGHT CHAIN LEVEL: ICD-10-CM

## 2023-07-20 LAB
M PROTEIN 24H UR ELPH-MRATE: 195.8 MG/24 HR (ref ?–149.1)
SPECIMEN VOL UR: 2200 ML

## 2023-07-20 PROCEDURE — 86335 IMMUNFIX E-PHORSIS/URINE/CSF: CPT

## 2023-07-20 PROCEDURE — 84156 ASSAY OF PROTEIN URINE: CPT

## 2023-07-20 PROCEDURE — 84166 PROTEIN E-PHORESIS/URINE/CSF: CPT

## 2023-08-19 ENCOUNTER — LAB ENCOUNTER (OUTPATIENT)
Dept: LAB | Age: 48
End: 2023-08-19
Attending: FAMILY MEDICINE
Payer: COMMERCIAL

## 2023-08-19 DIAGNOSIS — E66.3 OVERWEIGHT (BMI 25.0-29.9): ICD-10-CM

## 2023-08-19 DIAGNOSIS — Z51.81 THERAPEUTIC DRUG MONITORING: ICD-10-CM

## 2023-08-19 DIAGNOSIS — I77.82 ANCA-ASSOCIATED VASCULITIS (HCC): ICD-10-CM

## 2023-08-19 LAB
ALBUMIN SERPL-MCNC: 2.7 G/DL (ref 3.4–5)
ALBUMIN/GLOB SERPL: 0.6 {RATIO} (ref 1–2)
ALP LIVER SERPL-CCNC: 105 U/L
ALT SERPL-CCNC: 42 U/L
ANION GAP SERPL CALC-SCNC: 5 MMOL/L (ref 0–18)
AST SERPL-CCNC: 19 U/L (ref 15–37)
BILIRUB SERPL-MCNC: 0.3 MG/DL (ref 0.1–2)
BILIRUB UR QL STRIP.AUTO: NEGATIVE
BUN BLD-MCNC: 7 MG/DL (ref 7–18)
CALCIUM BLD-MCNC: 9.1 MG/DL (ref 8.5–10.1)
CHLORIDE SERPL-SCNC: 105 MMOL/L (ref 98–112)
CLARITY UR REFRACT.AUTO: CLEAR
CO2 SERPL-SCNC: 26 MMOL/L (ref 21–32)
COLOR UR AUTO: YELLOW
CREAT BLD-MCNC: 0.82 MG/DL
CREAT UR-SCNC: 258 MG/DL
EGFRCR SERPLBLD CKD-EPI 2021: 89 ML/MIN/1.73M2 (ref 60–?)
EST. AVERAGE GLUCOSE BLD GHB EST-MCNC: 117 MG/DL (ref 68–126)
FASTING STATUS PATIENT QL REPORTED: YES
GLOBULIN PLAS-MCNC: 4.8 G/DL (ref 2.8–4.4)
GLUCOSE BLD-MCNC: 86 MG/DL (ref 70–99)
GLUCOSE UR STRIP.AUTO-MCNC: NORMAL MG/DL
HBA1C MFR BLD: 5.7 % (ref ?–5.7)
KETONES UR STRIP.AUTO-MCNC: NEGATIVE MG/DL
LEUKOCYTE ESTERASE UR QL STRIP.AUTO: 75
NITRITE UR QL STRIP.AUTO: NEGATIVE
OSMOLALITY SERPL CALC.SUM OF ELEC: 279 MOSM/KG (ref 275–295)
PH UR STRIP.AUTO: 5.5 [PH] (ref 5–8)
POTASSIUM SERPL-SCNC: 3.7 MMOL/L (ref 3.5–5.1)
PROT SERPL-MCNC: 7.5 G/DL (ref 6.4–8.2)
PROT UR-MCNC: 24.2 MG/DL
RBC UR QL AUTO: NEGATIVE
SODIUM SERPL-SCNC: 136 MMOL/L (ref 136–145)
SP GR UR STRIP.AUTO: 1.01 (ref 1–1.03)
UROBILINOGEN UR STRIP.AUTO-MCNC: NORMAL MG/DL

## 2023-08-19 PROCEDURE — 83036 HEMOGLOBIN GLYCOSYLATED A1C: CPT | Performed by: FAMILY MEDICINE

## 2023-08-19 PROCEDURE — 82570 ASSAY OF URINE CREATININE: CPT | Performed by: INTERNAL MEDICINE

## 2023-08-19 PROCEDURE — 87086 URINE CULTURE/COLONY COUNT: CPT | Performed by: INTERNAL MEDICINE

## 2023-08-19 PROCEDURE — 81001 URINALYSIS AUTO W/SCOPE: CPT | Performed by: INTERNAL MEDICINE

## 2023-08-19 PROCEDURE — 80053 COMPREHEN METABOLIC PANEL: CPT | Performed by: FAMILY MEDICINE

## 2023-08-19 PROCEDURE — 84156 ASSAY OF PROTEIN URINE: CPT | Performed by: INTERNAL MEDICINE

## 2023-08-22 ENCOUNTER — TELEPHONE (OUTPATIENT)
Dept: FAMILY MEDICINE CLINIC | Facility: CLINIC | Age: 48
End: 2023-08-22

## 2023-08-22 ENCOUNTER — OFFICE VISIT (OUTPATIENT)
Dept: FAMILY MEDICINE CLINIC | Facility: CLINIC | Age: 48
End: 2023-08-22
Payer: COMMERCIAL

## 2023-08-22 VITALS
RESPIRATION RATE: 18 BRPM | DIASTOLIC BLOOD PRESSURE: 68 MMHG | TEMPERATURE: 98 F | BODY MASS INDEX: 28.44 KG/M2 | WEIGHT: 192 LBS | SYSTOLIC BLOOD PRESSURE: 112 MMHG | HEIGHT: 69 IN | HEART RATE: 92 BPM

## 2023-08-22 DIAGNOSIS — M31.30 GRANULOMATOSIS WITH POLYANGIITIS WITHOUT RENAL INVOLVEMENT (HCC): ICD-10-CM

## 2023-08-22 DIAGNOSIS — R91.8 PULMONARY NODULES: ICD-10-CM

## 2023-08-22 DIAGNOSIS — E66.3 OVERWEIGHT (BMI 25.0-29.9): Primary | ICD-10-CM

## 2023-08-22 DIAGNOSIS — K90.0 CELIAC DISEASE: ICD-10-CM

## 2023-08-22 PROCEDURE — 3078F DIAST BP <80 MM HG: CPT | Performed by: FAMILY MEDICINE

## 2023-08-22 PROCEDURE — 3074F SYST BP LT 130 MM HG: CPT | Performed by: FAMILY MEDICINE

## 2023-08-22 PROCEDURE — 99214 OFFICE O/P EST MOD 30 MIN: CPT | Performed by: FAMILY MEDICINE

## 2023-08-22 PROCEDURE — 3008F BODY MASS INDEX DOCD: CPT | Performed by: FAMILY MEDICINE

## 2023-08-22 NOTE — TELEPHONE ENCOUNTER
That would be fine but I don't manage weight loss medications. Could check with Dayna Speak to see if she will manage weight loss meds and patient could see her for medications.

## 2023-08-22 NOTE — TELEPHONE ENCOUNTER
Patient requesting to establish care with [facility]. LOV with Nadege Woodruff was 12/31/2020. NOV is 6 month follow up. LOV with pcp Dr. Keiko Macias was today 8/22/2023 before his custodial. Please advise. Thank you.

## 2023-08-28 ENCOUNTER — TELEPHONE (OUTPATIENT)
Dept: UROLOGY | Facility: CLINIC | Age: 48
End: 2023-08-28

## 2023-08-29 ENCOUNTER — VIRTUAL PHONE E/M (OUTPATIENT)
Dept: UROLOGY | Facility: CLINIC | Age: 48
End: 2023-08-29
Attending: OBSTETRICS & GYNECOLOGY
Payer: COMMERCIAL

## 2023-08-29 DIAGNOSIS — N95.2 POSTMENOPAUSAL ATROPHIC VAGINITIS: ICD-10-CM

## 2023-08-29 DIAGNOSIS — N39.41 URGE INCONTINENCE: ICD-10-CM

## 2023-08-29 DIAGNOSIS — N39.0 FREQUENT UTI: Primary | ICD-10-CM

## 2023-08-29 DIAGNOSIS — N81.84 PELVIC MUSCLE WASTING: ICD-10-CM

## 2023-09-09 ENCOUNTER — HOSPITAL ENCOUNTER (EMERGENCY)
Facility: HOSPITAL | Age: 48
Discharge: HOME | End: 2023-09-09
Attending: EMERGENCY MEDICINE
Payer: COMMERCIAL

## 2023-09-09 ENCOUNTER — APPOINTMENT (OUTPATIENT)
Dept: CT IMAGING | Facility: HOSPITAL | Age: 48
End: 2023-09-09
Attending: EMERGENCY MEDICINE
Payer: COMMERCIAL

## 2023-09-09 ENCOUNTER — HOSPITAL ENCOUNTER (OUTPATIENT)
Age: 48
Discharge: ANOTHER HOSPITAL | End: 2023-09-09
Payer: COMMERCIAL

## 2023-09-09 VITALS
OXYGEN SATURATION: 96 % | RESPIRATION RATE: 22 BRPM | SYSTOLIC BLOOD PRESSURE: 126 MMHG | HEART RATE: 96 BPM | TEMPERATURE: 98 F | DIASTOLIC BLOOD PRESSURE: 71 MMHG

## 2023-09-09 VITALS
HEART RATE: 92 BPM | WEIGHT: 185 LBS | TEMPERATURE: 98 F | SYSTOLIC BLOOD PRESSURE: 134 MMHG | DIASTOLIC BLOOD PRESSURE: 78 MMHG | BODY MASS INDEX: 27 KG/M2 | RESPIRATION RATE: 18 BRPM | OXYGEN SATURATION: 96 %

## 2023-09-09 DIAGNOSIS — R91.8 PULMONARY NODULES/LESIONS, MULTIPLE: Primary | ICD-10-CM

## 2023-09-09 DIAGNOSIS — R10.9 BILATERAL FLANK PAIN: ICD-10-CM

## 2023-09-09 DIAGNOSIS — R10.2 SUPRAPUBIC PAIN: Primary | ICD-10-CM

## 2023-09-09 LAB
ALBUMIN SERPL-MCNC: 2.5 G/DL (ref 3.4–5)
ALBUMIN/GLOB SERPL: 0.5 {RATIO} (ref 1–2)
ALP LIVER SERPL-CCNC: 88 U/L
ALT SERPL-CCNC: 124 U/L
ANION GAP SERPL CALC-SCNC: 6 MMOL/L (ref 0–18)
AST SERPL-CCNC: 48 U/L (ref 15–37)
B-HCG UR QL: NEGATIVE
BASOPHILS # BLD AUTO: 0.05 X10(3) UL (ref 0–0.2)
BASOPHILS NFR BLD AUTO: 0.4 %
BILIRUB SERPL-MCNC: 0.3 MG/DL (ref 0.1–2)
BILIRUB UR QL STRIP.AUTO: NEGATIVE
BILIRUB UR QL STRIP: NEGATIVE
BUN BLD-MCNC: 8 MG/DL (ref 7–18)
CALCIUM BLD-MCNC: 8.9 MG/DL (ref 8.5–10.1)
CHLORIDE SERPL-SCNC: 107 MMOL/L (ref 98–112)
CLARITY UR REFRACT.AUTO: CLEAR
CLARITY UR: CLEAR
CO2 SERPL-SCNC: 24 MMOL/L (ref 21–32)
COLOR UR AUTO: COLORLESS
COLOR UR: YELLOW
CREAT BLD-MCNC: 0.82 MG/DL
D DIMER PPP FEU-MCNC: 0.53 UG/ML FEU (ref ?–0.5)
EGFRCR SERPLBLD CKD-EPI 2021: 89 ML/MIN/1.73M2 (ref 60–?)
EOSINOPHIL # BLD AUTO: 0.15 X10(3) UL (ref 0–0.7)
EOSINOPHIL NFR BLD AUTO: 1.2 %
ERYTHROCYTE [DISTWIDTH] IN BLOOD BY AUTOMATED COUNT: 13.6 %
GLOBULIN PLAS-MCNC: 4.9 G/DL (ref 2.8–4.4)
GLUCOSE BLD-MCNC: 83 MG/DL (ref 70–99)
GLUCOSE UR STRIP-MCNC: NEGATIVE MG/DL
GLUCOSE UR STRIP.AUTO-MCNC: NORMAL MG/DL
HCT VFR BLD AUTO: 35.4 %
HGB BLD-MCNC: 11.3 G/DL
IMM GRANULOCYTES # BLD AUTO: 0.09 X10(3) UL (ref 0–1)
IMM GRANULOCYTES NFR BLD: 0.7 %
KETONES UR STRIP-MCNC: NEGATIVE MG/DL
KETONES UR STRIP.AUTO-MCNC: NEGATIVE MG/DL
LEUKOCYTE ESTERASE UR QL STRIP.AUTO: NEGATIVE
LIPASE SERPL-CCNC: 61 U/L (ref 13–75)
LYMPHOCYTES # BLD AUTO: 3.43 X10(3) UL (ref 1–4)
LYMPHOCYTES NFR BLD AUTO: 27.4 %
MCH RBC QN AUTO: 28.2 PG (ref 26–34)
MCHC RBC AUTO-ENTMCNC: 31.9 G/DL (ref 31–37)
MCV RBC AUTO: 88.3 FL
MONOCYTES # BLD AUTO: 1.41 X10(3) UL (ref 0.1–1)
MONOCYTES NFR BLD AUTO: 11.3 %
NEUTROPHILS # BLD AUTO: 7.39 X10 (3) UL (ref 1.5–7.7)
NEUTROPHILS # BLD AUTO: 7.39 X10(3) UL (ref 1.5–7.7)
NEUTROPHILS NFR BLD AUTO: 59 %
NITRITE UR QL STRIP.AUTO: NEGATIVE
NITRITE UR QL STRIP: NEGATIVE
OSMOLALITY SERPL CALC.SUM OF ELEC: 281 MOSM/KG (ref 275–295)
PH UR STRIP.AUTO: 6.5 [PH] (ref 5–8)
PH UR STRIP: 7 [PH]
PLATELET # BLD AUTO: 598 10(3)UL (ref 150–450)
POTASSIUM SERPL-SCNC: 3.9 MMOL/L (ref 3.5–5.1)
PROT SERPL-MCNC: 7.4 G/DL (ref 6.4–8.2)
PROT UR STRIP-MCNC: NEGATIVE MG/DL
PROT UR STRIP.AUTO-MCNC: NEGATIVE MG/DL
RBC # BLD AUTO: 4.01 X10(6)UL
RBC UR QL AUTO: NEGATIVE
SODIUM SERPL-SCNC: 137 MMOL/L (ref 136–145)
SP GR UR STRIP.AUTO: 1.01 (ref 1–1.03)
SP GR UR STRIP: 1.02
UROBILINOGEN UR STRIP-ACNC: <2 MG/DL
UROBILINOGEN UR STRIP.AUTO-MCNC: NORMAL MG/DL
WBC # BLD AUTO: 12.5 X10(3) UL (ref 4–11)

## 2023-09-09 PROCEDURE — 83690 ASSAY OF LIPASE: CPT | Performed by: EMERGENCY MEDICINE

## 2023-09-09 PROCEDURE — 85025 COMPLETE CBC W/AUTO DIFF WBC: CPT | Performed by: EMERGENCY MEDICINE

## 2023-09-09 PROCEDURE — 81002 URINALYSIS NONAUTO W/O SCOPE: CPT | Performed by: PHYSICIAN ASSISTANT

## 2023-09-09 PROCEDURE — 87086 URINE CULTURE/COLONY COUNT: CPT | Performed by: EMERGENCY MEDICINE

## 2023-09-09 PROCEDURE — 74177 CT ABD & PELVIS W/CONTRAST: CPT | Performed by: EMERGENCY MEDICINE

## 2023-09-09 PROCEDURE — 99285 EMERGENCY DEPT VISIT HI MDM: CPT

## 2023-09-09 PROCEDURE — 99215 OFFICE O/P EST HI 40 MIN: CPT | Performed by: PHYSICIAN ASSISTANT

## 2023-09-09 PROCEDURE — 80053 COMPREHEN METABOLIC PANEL: CPT | Performed by: EMERGENCY MEDICINE

## 2023-09-09 PROCEDURE — 87086 URINE CULTURE/COLONY COUNT: CPT | Performed by: PHYSICIAN ASSISTANT

## 2023-09-09 PROCEDURE — 85379 FIBRIN DEGRADATION QUANT: CPT | Performed by: EMERGENCY MEDICINE

## 2023-09-09 PROCEDURE — 81003 URINALYSIS AUTO W/O SCOPE: CPT | Performed by: EMERGENCY MEDICINE

## 2023-09-09 PROCEDURE — 81025 URINE PREGNANCY TEST: CPT | Performed by: PHYSICIAN ASSISTANT

## 2023-09-09 PROCEDURE — 71275 CT ANGIOGRAPHY CHEST: CPT | Performed by: EMERGENCY MEDICINE

## 2023-09-09 PROCEDURE — 87077 CULTURE AEROBIC IDENTIFY: CPT | Performed by: PHYSICIAN ASSISTANT

## 2023-09-09 PROCEDURE — 99284 EMERGENCY DEPT VISIT MOD MDM: CPT

## 2023-09-09 PROCEDURE — 96374 THER/PROPH/DIAG INJ IV PUSH: CPT

## 2023-09-09 RX ORDER — KETOROLAC TROMETHAMINE 15 MG/ML
15 INJECTION, SOLUTION INTRAMUSCULAR; INTRAVENOUS ONCE
Status: COMPLETED | OUTPATIENT
Start: 2023-09-09 | End: 2023-09-09

## 2023-09-09 RX ORDER — PREDNISONE 20 MG/1
40 TABLET ORAL DAILY
Qty: 10 TABLET | Refills: 0 | Status: SHIPPED | OUTPATIENT
Start: 2023-09-09 | End: 2023-09-14

## 2023-09-09 NOTE — ED INITIAL ASSESSMENT (HPI)
Pt ambulatory to ED w/ c/o abd/flank pain x2-3 weeks. Pt reports hx UTIs. Denies n/v, denies fevers. Denies hx kidney stones. PT states recent lab work completed with Rheumatologist. R/O kidney infection.

## 2023-09-09 NOTE — DISCHARGE INSTRUCTIONS
Go directly to [facility] emergency room. Do not make any stops. If anything changes along the way i.e. shortness of breath chest discomfort etc. pull over and dial 911. This case was discussed with the attending physician please see the attestation.

## 2023-09-09 NOTE — ED INITIAL ASSESSMENT (HPI)
Pt has had 3-4 weeks of lower back and flank pain, and she has been feeling very fatigued. Her RA markers are up and her urine is cloudy,   no fever. But she is on macrobid for UTI prevention.

## 2023-09-21 ENCOUNTER — OFFICE VISIT (OUTPATIENT)
Facility: CLINIC | Age: 48
End: 2023-09-21
Payer: COMMERCIAL

## 2023-09-21 VITALS
HEART RATE: 99 BPM | SYSTOLIC BLOOD PRESSURE: 100 MMHG | DIASTOLIC BLOOD PRESSURE: 62 MMHG | BODY MASS INDEX: 27.85 KG/M2 | RESPIRATION RATE: 16 BRPM | HEIGHT: 69 IN | OXYGEN SATURATION: 98 % | WEIGHT: 188 LBS

## 2023-09-21 DIAGNOSIS — R91.8 LUNG NODULES: Primary | ICD-10-CM

## 2023-09-21 DIAGNOSIS — M54.50 ACUTE BILATERAL LOW BACK PAIN WITHOUT SCIATICA: ICD-10-CM

## 2023-09-21 PROCEDURE — 3078F DIAST BP <80 MM HG: CPT | Performed by: INTERNAL MEDICINE

## 2023-09-21 PROCEDURE — 99204 OFFICE O/P NEW MOD 45 MIN: CPT | Performed by: INTERNAL MEDICINE

## 2023-09-21 PROCEDURE — 3008F BODY MASS INDEX DOCD: CPT | Performed by: INTERNAL MEDICINE

## 2023-09-21 PROCEDURE — 3074F SYST BP LT 130 MM HG: CPT | Performed by: INTERNAL MEDICINE

## 2023-09-21 RX ORDER — PREDNISONE 10 MG/1
10 TABLET ORAL DAILY
Qty: 30 TABLET | Refills: 1 | Status: SHIPPED | OUTPATIENT
Start: 2023-09-21

## 2023-09-21 RX ORDER — TIZANIDINE 2 MG/1
2 TABLET ORAL
COMMUNITY
Start: 2023-08-24

## 2023-09-21 NOTE — PATIENT INSTRUCTIONS
Start the prednisone 10mg daily with plan for two week course  We will plan on the bronchoscopy in the next week or two

## 2023-09-22 ENCOUNTER — TELEPHONE (OUTPATIENT)
Facility: CLINIC | Age: 48
End: 2023-09-22

## 2023-09-22 DIAGNOSIS — R59.0 THORACIC LYMPHADENOPATHY: ICD-10-CM

## 2023-09-22 DIAGNOSIS — R91.8 LUNG NODULES: Primary | ICD-10-CM

## 2023-09-22 RX ORDER — MULTIVITAMIN
TABLET ORAL DAILY
COMMUNITY

## 2023-09-22 NOTE — TELEPHONE ENCOUNTER
Pt was added to schedule for bronch on 9-25-23. PA obtained. Pt needs to be notified of the following:  She needs to arrive an hour before ( @12:50)  Check into outpatient registration  Bring insurance cards and photo  Someone will need to drive her there and home and be with her for 24 hours after  She cannot eat or drink 8 hours before  Attempted calls to pt,  and sister. Sister will text pt to instruct her to call us. Will await phone call from pt.

## 2023-09-25 ENCOUNTER — ANESTHESIA (OUTPATIENT)
Dept: ENDOSCOPY | Facility: HOSPITAL | Age: 48
End: 2023-09-25
Payer: COMMERCIAL

## 2023-09-25 ENCOUNTER — APPOINTMENT (OUTPATIENT)
Dept: GENERAL RADIOLOGY | Facility: HOSPITAL | Age: 48
End: 2023-09-25
Attending: INTERNAL MEDICINE
Payer: COMMERCIAL

## 2023-09-25 ENCOUNTER — ANESTHESIA EVENT (OUTPATIENT)
Dept: ENDOSCOPY | Facility: HOSPITAL | Age: 48
End: 2023-09-25
Payer: COMMERCIAL

## 2023-09-25 ENCOUNTER — HOSPITAL ENCOUNTER (OUTPATIENT)
Facility: HOSPITAL | Age: 48
Setting detail: HOSPITAL OUTPATIENT SURGERY
Discharge: HOME | End: 2023-09-25
Attending: INTERNAL MEDICINE | Admitting: INTERNAL MEDICINE
Payer: COMMERCIAL

## 2023-09-25 ENCOUNTER — HOSPITAL ENCOUNTER (OUTPATIENT)
Dept: CT IMAGING | Facility: HOSPITAL | Age: 48
Discharge: HOME | End: 2023-09-25
Attending: INTERNAL MEDICINE
Payer: COMMERCIAL

## 2023-09-25 VITALS
HEIGHT: 69 IN | BODY MASS INDEX: 27.85 KG/M2 | TEMPERATURE: 97 F | RESPIRATION RATE: 20 BRPM | SYSTOLIC BLOOD PRESSURE: 135 MMHG | HEART RATE: 103 BPM | DIASTOLIC BLOOD PRESSURE: 78 MMHG | OXYGEN SATURATION: 100 % | WEIGHT: 188 LBS

## 2023-09-25 DIAGNOSIS — R91.8 LUNG NODULES: ICD-10-CM

## 2023-09-25 DIAGNOSIS — R59.0 THORACIC LYMPHADENOPATHY: ICD-10-CM

## 2023-09-25 LAB
B-HCG UR QL: NEGATIVE
BASOPHILS NFR BRONCH: 0 %
EOSINOPHIL NFR BRONCH: 0 %
LYMPHOCYTES NFR BRONCH: 43 %
MONOS+MACROS NFR BRONCH: 54 %
NEUTROPHILS NFR BRONCH: 3 %
TOTAL CELLS COUNTED FLD: 35

## 2023-09-25 PROCEDURE — 71045 X-RAY EXAM CHEST 1 VIEW: CPT | Performed by: INTERNAL MEDICINE

## 2023-09-25 PROCEDURE — 76497 UNLISTED CT PROCEDURE: CPT | Performed by: INTERNAL MEDICINE

## 2023-09-25 PROCEDURE — 31628 BRONCHOSCOPY/LUNG BX EACH: CPT | Performed by: INTERNAL MEDICINE

## 2023-09-25 PROCEDURE — 31652 BRONCH EBUS SAMPLNG 1/2 NODE: CPT | Performed by: INTERNAL MEDICINE

## 2023-09-25 PROCEDURE — 31627 NAVIGATIONAL BRONCHOSCOPY: CPT | Performed by: INTERNAL MEDICINE

## 2023-09-25 PROCEDURE — 31624 DX BRONCHOSCOPE/LAVAGE: CPT | Performed by: INTERNAL MEDICINE

## 2023-09-25 PROCEDURE — 31629 BRONCHOSCOPY/NEEDLE BX EACH: CPT | Performed by: INTERNAL MEDICINE

## 2023-09-25 PROCEDURE — 31654 BRONCH EBUS IVNTJ PERPH LES: CPT | Performed by: INTERNAL MEDICINE

## 2023-09-25 PROCEDURE — 07978ZX DRAINAGE OF THORAX LYMPHATIC, VIA NATURAL OR ARTIFICIAL OPENING ENDOSCOPIC APPROACH, DIAGNOSTIC: ICD-10-PCS | Performed by: INTERNAL MEDICINE

## 2023-09-25 PROCEDURE — 0B9D8ZX DRAINAGE OF RIGHT MIDDLE LUNG LOBE, VIA NATURAL OR ARTIFICIAL OPENING ENDOSCOPIC, DIAGNOSTIC: ICD-10-PCS | Performed by: INTERNAL MEDICINE

## 2023-09-25 PROCEDURE — 0BBD8ZX EXCISION OF RIGHT MIDDLE LUNG LOBE, VIA NATURAL OR ARTIFICIAL OPENING ENDOSCOPIC, DIAGNOSTIC: ICD-10-PCS | Performed by: INTERNAL MEDICINE

## 2023-09-25 PROCEDURE — 8E0WXBZ COMPUTER ASSISTED PROCEDURE OF TRUNK REGION: ICD-10-PCS | Performed by: INTERNAL MEDICINE

## 2023-09-25 RX ORDER — HYDROMORPHONE HYDROCHLORIDE 1 MG/ML
0.6 INJECTION, SOLUTION INTRAMUSCULAR; INTRAVENOUS; SUBCUTANEOUS EVERY 5 MIN PRN
Status: DISCONTINUED | OUTPATIENT
Start: 2023-09-25 | End: 2023-09-25 | Stop reason: HOSPADM

## 2023-09-25 RX ORDER — NALOXONE HYDROCHLORIDE 0.4 MG/ML
80 INJECTION, SOLUTION INTRAMUSCULAR; INTRAVENOUS; SUBCUTANEOUS AS NEEDED
Status: DISCONTINUED | OUTPATIENT
Start: 2023-09-25 | End: 2023-09-25 | Stop reason: HOSPADM

## 2023-09-25 RX ORDER — HYDROCODONE BITARTRATE AND ACETAMINOPHEN 5; 325 MG/1; MG/1
1 TABLET ORAL ONCE AS NEEDED
Status: DISCONTINUED | OUTPATIENT
Start: 2023-09-25 | End: 2023-09-25 | Stop reason: HOSPADM

## 2023-09-25 RX ORDER — ONDANSETRON 2 MG/ML
INJECTION INTRAMUSCULAR; INTRAVENOUS AS NEEDED
Status: DISCONTINUED | OUTPATIENT
Start: 2023-09-25 | End: 2023-09-25 | Stop reason: SURG

## 2023-09-25 RX ORDER — SODIUM CHLORIDE, SODIUM LACTATE, POTASSIUM CHLORIDE, CALCIUM CHLORIDE 600; 310; 30; 20 MG/100ML; MG/100ML; MG/100ML; MG/100ML
INJECTION, SOLUTION INTRAVENOUS CONTINUOUS
Status: DISCONTINUED | OUTPATIENT
Start: 2023-09-25 | End: 2023-09-25

## 2023-09-25 RX ORDER — ACETAMINOPHEN 500 MG
1000 TABLET ORAL ONCE
Status: DISCONTINUED | OUTPATIENT
Start: 2023-09-25 | End: 2023-09-25 | Stop reason: HOSPADM

## 2023-09-25 RX ORDER — DEXAMETHASONE SODIUM PHOSPHATE 4 MG/ML
VIAL (ML) INJECTION AS NEEDED
Status: DISCONTINUED | OUTPATIENT
Start: 2023-09-25 | End: 2023-09-25 | Stop reason: SURG

## 2023-09-25 RX ORDER — ACETAMINOPHEN 500 MG
1000 TABLET ORAL ONCE AS NEEDED
Status: DISCONTINUED | OUTPATIENT
Start: 2023-09-25 | End: 2023-09-25 | Stop reason: HOSPADM

## 2023-09-25 RX ORDER — HYDROMORPHONE HYDROCHLORIDE 1 MG/ML
0.2 INJECTION, SOLUTION INTRAMUSCULAR; INTRAVENOUS; SUBCUTANEOUS EVERY 5 MIN PRN
Status: DISCONTINUED | OUTPATIENT
Start: 2023-09-25 | End: 2023-09-25 | Stop reason: HOSPADM

## 2023-09-25 RX ORDER — ROCURONIUM BROMIDE 10 MG/ML
INJECTION, SOLUTION INTRAVENOUS AS NEEDED
Status: DISCONTINUED | OUTPATIENT
Start: 2023-09-25 | End: 2023-09-25 | Stop reason: SURG

## 2023-09-25 RX ORDER — ONDANSETRON 2 MG/ML
4 INJECTION INTRAMUSCULAR; INTRAVENOUS EVERY 6 HOURS PRN
Status: DISCONTINUED | OUTPATIENT
Start: 2023-09-25 | End: 2023-09-25 | Stop reason: HOSPADM

## 2023-09-25 RX ORDER — HYDROMORPHONE HYDROCHLORIDE 1 MG/ML
0.4 INJECTION, SOLUTION INTRAMUSCULAR; INTRAVENOUS; SUBCUTANEOUS EVERY 5 MIN PRN
Status: DISCONTINUED | OUTPATIENT
Start: 2023-09-25 | End: 2023-09-25 | Stop reason: HOSPADM

## 2023-09-25 RX ORDER — MIDAZOLAM HYDROCHLORIDE 1 MG/ML
1 INJECTION INTRAMUSCULAR; INTRAVENOUS EVERY 5 MIN PRN
Status: DISCONTINUED | OUTPATIENT
Start: 2023-09-25 | End: 2023-09-25 | Stop reason: HOSPADM

## 2023-09-25 RX ORDER — HYDROCODONE BITARTRATE AND ACETAMINOPHEN 5; 325 MG/1; MG/1
2 TABLET ORAL ONCE AS NEEDED
Status: DISCONTINUED | OUTPATIENT
Start: 2023-09-25 | End: 2023-09-25 | Stop reason: HOSPADM

## 2023-09-25 RX ORDER — PROCHLORPERAZINE EDISYLATE 5 MG/ML
5 INJECTION INTRAMUSCULAR; INTRAVENOUS EVERY 8 HOURS PRN
Status: DISCONTINUED | OUTPATIENT
Start: 2023-09-25 | End: 2023-09-25 | Stop reason: HOSPADM

## 2023-09-25 RX ORDER — SODIUM CHLORIDE, SODIUM LACTATE, POTASSIUM CHLORIDE, CALCIUM CHLORIDE 600; 310; 30; 20 MG/100ML; MG/100ML; MG/100ML; MG/100ML
INJECTION, SOLUTION INTRAVENOUS CONTINUOUS
Status: DISCONTINUED | OUTPATIENT
Start: 2023-09-25 | End: 2023-09-25 | Stop reason: HOSPADM

## 2023-09-25 RX ORDER — ALBUTEROL SULFATE 2.5 MG/3ML
2.5 SOLUTION RESPIRATORY (INHALATION) AS NEEDED
Status: DISCONTINUED | OUTPATIENT
Start: 2023-09-25 | End: 2023-09-25 | Stop reason: HOSPADM

## 2023-09-25 RX ORDER — LIDOCAINE HYDROCHLORIDE 10 MG/ML
INJECTION, SOLUTION EPIDURAL; INFILTRATION; INTRACAUDAL; PERINEURAL AS NEEDED
Status: DISCONTINUED | OUTPATIENT
Start: 2023-09-25 | End: 2023-09-25 | Stop reason: SURG

## 2023-09-25 RX ORDER — SCOLOPAMINE TRANSDERMAL SYSTEM 1 MG/1
1 PATCH, EXTENDED RELEASE TRANSDERMAL ONCE
Status: DISCONTINUED | OUTPATIENT
Start: 2023-09-25 | End: 2023-09-25 | Stop reason: HOSPADM

## 2023-09-25 RX ADMIN — DEXAMETHASONE SODIUM PHOSPHATE 8 MG: 4 MG/ML VIAL (ML) INJECTION at 14:55:00

## 2023-09-25 RX ADMIN — ROCURONIUM BROMIDE 50 MG: 10 INJECTION, SOLUTION INTRAVENOUS at 14:49:00

## 2023-09-25 RX ADMIN — ROCURONIUM BROMIDE 20 MG: 10 INJECTION, SOLUTION INTRAVENOUS at 15:11:00

## 2023-09-25 RX ADMIN — LIDOCAINE HYDROCHLORIDE 100 MG: 10 INJECTION, SOLUTION EPIDURAL; INFILTRATION; INTRACAUDAL; PERINEURAL at 14:49:00

## 2023-09-25 RX ADMIN — ONDANSETRON 4 MG: 2 INJECTION INTRAMUSCULAR; INTRAVENOUS at 14:55:00

## 2023-09-25 NOTE — INTERVAL H&P NOTE
Pre-op Diagnosis: Lung nodules [R91.8]  Thoracic lymphadenopathy [R59.0]    The above referenced H&P was reviewed by Sheila Damon MD on 9/25/2023, the patient was examined and no significant changes have occurred in the patient's condition since the H&P was performed. I discussed with the patient and/or legal representative the potential benefits, risks and side effects of this procedure; the likelihood of the patient achieving goals; and potential problems that might occur during recuperation. I discussed reasonable alternatives to the procedure, including risks, benefits and side effects related to the alternatives and risks related to not receiving this procedure. We will proceed with procedure as planned.

## 2023-09-25 NOTE — ANESTHESIA PROCEDURE NOTES
Airway  Date/Time: 9/25/2023 2:50 PM  Urgency: elective      General Information and Staff    Patient location during procedure: OR  Anesthesiologist: Kulwinder To MD  Performed: anesthesiologist   Performed by: Kulwinder To MD  Authorized by: Kulwinder To MD      Indications and Patient Condition  Indications for airway management: anesthesia  Spontaneous Ventilation: absent  Sedation level: deep  Preoxygenated: yes  Patient position: sniffing  Mask difficulty assessment: 1 - vent by mask    Final Airway Details  Final airway type: endotracheal airway      Successful airway: ETT  Cuffed: yes   Successful intubation technique: direct laryngoscopy  Endotracheal tube insertion site: oral  Blade: Bhavna  Blade size: #3  ETT size (mm): 8.5    Cormack-Lehane Classification: grade IIA - partial view of glottis  Placement verified by: capnometry   Measured from: lips  ETT to lips (cm): 20  Number of attempts at approach: 1

## 2023-09-25 NOTE — OPERATIVE REPORT
[address] [place] [address] Patient Status:  Hospital Outpatient Surgery    1975 MRN [id number]   Location [address] [address] Attending Kristopher Andrade MD   Hosp Day # 0 PCP Dyan Rodriguez MD     OPERATIVE REPORT:     DATE OF OPERATION: 23    PREOPERATIVE DIAGNOSIS(ES): lung nodules, thoracic lymphadenopathy     POSTOPERATIVE DIAGNOSIS(ES): same     OPERATION(S) PERFORMED:   Robotic navigational bronchoscopy with transbronchial needle aspiration, transbronchial biopsy, transbronchial cryobiopsies  and bronchoalveolar lavage of the right middle lobe lateral segment with radial probe endobronchial ultrasound and fluoroscopic guidance. Bronchoscopy with endobronchial ultrasound- guided transbronchial needle aspiration of right interlobar lymphadenopathy     SURGEON: Angel Frost MD    ANESTHESIA: General. Please see separate flow sheet. EQUIPMENT:   Olympus 7.5 MHz endobronchial ultrasound bronchoscope with dedicated 22-gauge ViziShot needle. Olympus 20 MHz radial probe, endobronchial ultrasound  Olympus adult therapeutic video bronchoscope  Robotic Ion navigation software with vision probe  Standard C-arm fluoroscope. Standard forceps   23 gauge Intuitive Flexision needle  ERBE 1.1mm cryoprobe and ERBE console    INDICATION: The patient is a 50year old female who was found to have new nodules in the right middle lobe of unclear significance. The procedure is being undertaken for diagnostic purposes and mediastinal staging. Differential diagnosis, risks, benefits and alternatives were discussed at length. Alternatives such as mediastinoscopy and conventional transbronchial needle biopsy were discussed. Endobronchial ultrasound guided transbronchial needle aspiration is  superior to blind transbronchial needle aspiration and is the recommended approach for this indication.        CONSENT:  Risks and benefits were reviewed with the patient in detail regarding the procedure as well as anesthesia prior to the procedure. All questions were answered, and the patient was agreeable. PROCEDURE:    Time out done prior to the start procedure. The patient's CT scan and 3-dimensional DICOM format was loaded onto the Smarty Ants. Target point T1 in the right middle lobe was marked and measured at 9x14 mm. Virtual pathways were created to the lesion. This information was stored to a jump drive and loaded onto the Intuitive Ion controller software in the bronchoscopy suite. Robotic navigation, airway evaluation and biopsies  The patient was placed in a supine position, anesthesia administered, ETT was placed. The flexible bronchoscope was inserted and airway inspection was performed down to the subsegmental levels. No endobronchial lesions were seen. The scope was then withdrawn. The robotic Ion catheter was introduced via the swivel adaptor in the ETT tube. Registration was performed and confirmed with acceptable divergence. Using shape sensing robotic catheter guidance, the right middle lobe lesion was located within the lateral segment. Subsequently, radial probe ultrasound was introduced through the robotic catheter confirming appropriate positioning with eccentric view. Using additional fluoroscopic guidance, transbronchial needle aspirations, transbronchial biopsies and transbronchial cryobiopsies were performed. Passes were given to the cytopathologist for screening with report of inflammation. A bronchoalveolar lavage was then performed in the right middle lobe lateral segment with the instillation of 30 ml sterile saline and return of 17 ml which was split and sent for microbiological studies and cytology. There was no evidence of active bleeding. Robotic catheter was withdrawn. Endobronchial ultrasound  The ultrasound videobronchoscope was used and inserted via swivel adaptor attached to the endotracheal tube.  The bronchoscope was then advanced into the trachea. Ultrasound examination revealed a 4 mm lymph node in the 11L left hilar location, a 5 mm lymph node in the 7 subcarinal location, a 4 mm lymph node in the 4L left paratracheal location, a 4 mm lymph node in the 4R right paratracheal location, and a 10 mm lymph node in the 11R right hilar location. 7 passes were taken at the 11R right interlobar location, with 1 pass given to the cytotechnician/cytopathologist for screening, and the others placed into saline for processing. Hemostasis was visually confirmed and the bronchoscope was removed. The patient tolerated the procedure well without immediate complications. EBL:  <5mL     IMPRESSION:   Right middle lobe nodules  Thoracic lymphadenopathy     PLAN:   await results of bronchoscopy for further delineation of care.       Hilda Ruano MD

## 2023-09-25 NOTE — DISCHARGE SUMMARY
Outpatient Surgery Brief Discharge Summary         Patient ID:  Sherman Richmond  [id number]  50year old  9/12/1975    Discharge Diagnoses: Lung nodules, Thoracic lymphadenopathy    Procedures: bronchoscopy    Discharged Condition: stable    Disposition: home    Patient Instructions: Follow-up with Juanita Reaves MD in 1-2 weeks.     Diet: regular diet  Activity: as tolerated    Juanita Reaves MD  9/25/2023  4:03 PM

## 2023-09-26 ENCOUNTER — TELEPHONE (OUTPATIENT)
Facility: CLINIC | Age: 48
End: 2023-09-26

## 2023-09-26 NOTE — TELEPHONE ENCOUNTER
Call placed to pt for post bronch follow up. Pt is doing well. Only complaints are slight sore throat and raspy voice. Pt reminded of appt with Dr. Kasia Mercedes on 10-2-23. Advised to call if any additional questions or concerns.

## 2023-10-02 ENCOUNTER — OFFICE VISIT (OUTPATIENT)
Facility: CLINIC | Age: 48
End: 2023-10-02
Payer: COMMERCIAL

## 2023-10-02 VITALS
HEIGHT: 69 IN | DIASTOLIC BLOOD PRESSURE: 70 MMHG | SYSTOLIC BLOOD PRESSURE: 108 MMHG | HEART RATE: 107 BPM | WEIGHT: 187 LBS | BODY MASS INDEX: 27.7 KG/M2 | OXYGEN SATURATION: 99 % | RESPIRATION RATE: 16 BRPM

## 2023-10-02 DIAGNOSIS — M54.50 ACUTE BILATERAL LOW BACK PAIN WITHOUT SCIATICA: ICD-10-CM

## 2023-10-02 DIAGNOSIS — R91.8 LUNG NODULES: Primary | ICD-10-CM

## 2023-10-02 PROCEDURE — 3074F SYST BP LT 130 MM HG: CPT | Performed by: INTERNAL MEDICINE

## 2023-10-02 PROCEDURE — 3078F DIAST BP <80 MM HG: CPT | Performed by: INTERNAL MEDICINE

## 2023-10-02 PROCEDURE — 3008F BODY MASS INDEX DOCD: CPT | Performed by: INTERNAL MEDICINE

## 2023-10-06 ENCOUNTER — PATIENT MESSAGE (OUTPATIENT)
Dept: FAMILY MEDICINE CLINIC | Facility: CLINIC | Age: 48
End: 2023-10-06

## 2023-10-06 NOTE — TELEPHONE ENCOUNTER
LOV with Dr Iker Sebastian was 8/22/23. He documented pt to continue 2mg weekly ozempic dose and f/u in 6 months. Future Appointments   Date Time Provider Lianet Ash   2/23/2024 10:00 AM Dressler, Derrell Naval [facility] 00 [id number]   2/27/2024  2:40 PM DO MARYANN Tierney None     Last Rx 6/26/23 #3 + 0     Please send to [facility] if agreeable. APER

## 2023-10-09 ENCOUNTER — PATIENT MESSAGE (OUTPATIENT)
Facility: CLINIC | Age: 48
End: 2023-10-09

## 2023-10-09 NOTE — TELEPHONE ENCOUNTER
Live call from pt. Sts she is returning a call. Reports she is almost out of ozempic but is finding most pharmacies are out of dose she needs. I reviewed notes below by Alonso Thakkar  I advised that she does not manage wt loss meds but recommends to see [facility] for this. Appt booked for next week, I advised to address further at the vs in hopes a pharmacy will have in stock for her to send. She voiced understanding.

## 2023-10-09 NOTE — TELEPHONE ENCOUNTER
Spoke with pt; she would be happy to have a THV with Dr Sunil Hanson tomorrow. I added Lio Zuniga to help schedule.  Thnx

## 2023-10-10 ENCOUNTER — TELEMEDICINE (OUTPATIENT)
Facility: CLINIC | Age: 48
End: 2023-10-10
Payer: COMMERCIAL

## 2023-10-10 DIAGNOSIS — J45.20 MILD INTERMITTENT ASTHMA WITHOUT COMPLICATION: ICD-10-CM

## 2023-10-10 DIAGNOSIS — R91.8 LUNG NODULES: Primary | ICD-10-CM

## 2023-10-10 DIAGNOSIS — M54.50 ACUTE BILATERAL LOW BACK PAIN WITHOUT SCIATICA: ICD-10-CM

## 2023-10-10 PROCEDURE — 99214 OFFICE O/P EST MOD 30 MIN: CPT | Performed by: INTERNAL MEDICINE

## 2023-10-10 RX ORDER — PREDNISONE 5 MG/1
10 TABLET ORAL DAILY
Qty: 90 TABLET | Refills: 1 | Status: SHIPPED | OUTPATIENT
Start: 2023-10-10

## 2023-10-10 NOTE — PROGRESS NOTES
[facility] General Pulmonary Progress Note    History of Present Illness:  Janene Johnson is a 50year old female  never smoker with significant PMH of asthma, pulmonary nodules who presents today for follow up of lung nodules post bronchoscopy. She denies new concerns today. Has been on pred 10 daily for her back pain but notes some days are worse than others. No dyspnea, cough or phlegm. No f/c/s. No chest pain    10/2023 previously  She underwent bronchoscopy last week with no issues post procedure although the initial post procedure CXR did raise concern for pneumothorax, but there was no evidence on repeat chest film. She denies acute concerns today. No cough or dyspnea. Her back pain is minimal and remains on pred 10mg daily. September 2023 previously  She has followed with Dr. Malcom Scott for lung nodules in the past and had a previous CT guided biopsy only showing granulomas in the past.  She has had intermittent prednisone regimens for pain, which she relates back pain previously preceded her initial presently nearly a decade ago. She has had workup with Dr. Malcom Scott and Dr. Jarrett Amaya with suspicious for GPA however her labs have been disconcordant to clinical suspicion. She reports having felt well until August 2023 when she again developed flank and back pain. She tried to manage at home but given persistence she went to ER and had CT chest showing several new lung nodules in the RML. She denies any dyspnea, wheeze, chest pain/pressure. No f/c/s. No weight changes. She does endorse minimal coughing but no phlegm over the past month. She was given prednisone 40mg x 5 days with improvement in her pain however after completing the prednisone, her pain returned in her mid back (different than earlier). She does have a history of asthma but has been well controlled and previously only used albuterol, but given lack of symptoms she no longer has albuterol inhaler.      Past Medical History:   Past Medical History:   Diagnosis Date    Anesthesia complication     slow to wake after general anesthesia    Arthritis     Asthma     exercise induced    Celiac disease     Gallstones     H/O pleurisy 01/24/2015    treated with course of steroids-followed by Dr Molly Houser aware.     Vascul[facility] ([facility]     \"wagners vasculitis\"    Visual impairment     glasses and contacts        Past Surgical History:   Past Surgical History:   Procedure Laterality Date    COLONOSCOPY      2014    DILATION/CURETTAGE,DIAGNOSTIC      EXCISION TURBINATE,SUBMUCOUS  11/27/2020    EYE SURGERY Right 03/2015    repair of retinal tear    LAPAROSCOPIC CHOLECYSTECTOMY  10/10/2013    Procedure: LAPAROSCOPIC CHOLECYSTECTOMY;  Surgeon: Donita Roche MD;  Location: [facility] MAIN OR    OTHER SURGICAL HISTORY  02/2015    Bladder Mesh    OTHER SURGICAL HISTORY Left 09/30/2022    breast bx (benign)    REPAIR OF NASAL SEPTUM  11/27/2020    UPPER GI ENDOSCOPY,EXAM      biopsies          Family Medical History:   Family History   Problem Relation Age of Onset    Heart Attack Mother 77        Acute MI    High Cholesterol Mother         Hypercholesterolemia    Heart Disease Mother     Obesity Mother     High Cholesterol Father         Hypercholesterolemia    Heart Disorder Father         Palpitations    Obesity Sister     Diabetes Brother     Diabetes Brother     Breast Cancer Other     Cancer Neg         Social History: Social History    Socioeconomic History      Marital status:       Spouse name: Not on file      Number of children: Not on file      Years of education: Not on file      Highest education level: Not on file    Occupational History      Not on file    Tobacco Use      Smoking status: Never      Smokeless tobacco: Never    Vaping Use      Vaping Use: Never used    Substance and Sexual Activity      Alcohol use: No      Drug use: No      Sexual activity: Yes        Partners: Male        Birth control/protection: OCP    Other Topics      Concerns:         Service: Not Asked        Blood Transfusions: Not Asked        Caffeine Concern: No          1-2 per week        Occupational Exposure: Not Asked        Hobby Hazards: Not Asked        Sleep Concern: Not Asked        Stress Concern: Not Asked        Weight Concern: Not Asked        Special Diet: Not Asked        Back Care: Not Asked        Exercise: Yes          silva 2 x a week        Bike Helmet: Not Asked        Seat Belt: Not Asked        Self-Exams: Not Asked    Social History Narrative      Not on file    Social Determinants of Health  Financial Resource Strain: Not on file  Food Insecurity: Not on file  Transportation Needs: Not on file  Physical Activity: Not on file  Stress: Not on file  Social Connections: Not on file  Housing Stability: Not on file     Medications:   Current Outpatient Medications   Medication Sig Dispense Refill    predniSONE 5 MG Oral Tab Take 2 tablets (10 mg total) by mouth daily. 90 tablet 1    Multiple Vitamin (MULTI-DAY VITAMINS) Oral Tab Take by mouth daily. IRON OR daily. tiZANidine 2 MG Oral Tab Take 1 tablet (2 mg total) by mouth. 1/2 tab prn      predniSONE 10 MG Oral Tab Take 1 tablet (10 mg total) by mouth daily. 30 tablet 1    MYRBETRIQ 50 MG Oral Tablet 24 Hr Take 1 tablet (50 mg total) by mouth daily. Once daily 90 tablet 3    semaglutide 8 MG/3ML Subcutaneous Solution Pen-injector Inject 2 mg into the skin once a week. 3 each 0    montelukast 10 MG Oral Tab TAKE 1 TABLET BY MOUTH EVERY DAY AT NIGHT (Patient taking differently: prn) 30 tablet 0    celecoxib 100 MG Oral Cap Take 1 capsule (100 mg total) by mouth as needed. Takes once daily      Levonorgest-Eth Estrad 91-Day (CAMRESE LO) 0.1-0.02 & 0.01 MG Oral Tab Take 1 tablet by mouth daily. 91 tablet 0    nitrofurantoin macrocrystal (MACRODANTIN) 50 MG Oral Cap Take 1 capsule (50 mg total) by mouth nightly.  90 capsule 0    estradiol (ESTRACE) 0.1 MG/GM Vaginal Cream 1/2 gram twice weekly per vagina 1 each 3    Fluocinolone Acetonide 0.01 % Otic Oil Place in ear(s) as needed. Acidophilus/Pectin Oral Cap Take 1 capsule by mouth daily. Takes prn      Cholecalciferol (VITAMIN D3) 5000 UNITS Oral Tab Take 1 tablet (5,000 Units total) by mouth daily. Review of Systems: Review of Systems   Constitutional: Negative. HENT: Negative. Respiratory: Negative. Musculoskeletal:  Positive for back pain. All other systems reviewed and are negative. Physical Exam:  LMP 09/18/2023 (Approximate)      Constitutional: alert, cooperative. No acute distress. HEENT: Head NC/AT. Results:  Personally reviewed    WBC: 12.5, done on 9/9/2023. HGB: 11.3, done on 9/9/2023. PLT: 598, done on 9/9/2023. Glucose: 83, done on 9/9/2023. Cr: 0.82, done on 9/9/2023. GFR(AA): 124, done on 7/21/2022. GFR (non-AA): 107, done on 7/21/2022. CA: 8.9, done on 9/9/2023. Na: 137, done on 9/9/2023. K: 3.9, done on 9/9/2023. Cl: 107, done on 9/9/2023. CO2: 24, done on 9/9/2023. Last ALB was 2.5% done on 9/9/2023. XR CHEST AP PORTABLE  (CPT=71045)    Result Date: 9/25/2023  CONCLUSION:  1. No pneumothorax appreciated. Findings discussed with Dr. Ap Mitchell at the time of this dictation. LOCATION:  [facility]      Dictated by (CST): Elmo Camacho MD on 9/25/2023 at [address] by (CST): Elmo Camacho MD on 9/25/2023 at 6:16 PM       XR CHEST AP PORTABLE  (CPT=71045)    Result Date: 9/25/2023  CONCLUSION:  1. Small right apical pneumothorax as detailed above. Follow-up as clinically indicated is recommended. This report was communicated by telephone to the patient's nurse, Emeli Dee at the dictation time shown below. LOCATION:  [facility]      Dictated by (CST): Elmo Camacho MD on 9/25/2023 at 4:48 PM     Finalized by (CST): Elmo Camacho MD on 9/25/2023 at 4:58 PM       CT CHEST BRONCH NAVIGATION PRE OP LESS THAN 6 WEEKS (SYO=40640)    Result Date: 9/25/2023  CONCLUSION:  1. Interval resolution of right hilar adenopathy. 2. New patchy ground-glass opacities in the right upper lobe. These are most likely inflammatory/infectious in nature. Short-term follow-up is recommended for continued assessment. 3. Improving reticular nodular opacities in the right middle lobe. 4. Details as above. Continued clinical correlation recommended. LOCATION:  [facility]   Dictated by (CST): Max Meraz MD on 9/25/2023 at 4:09 PM     Finalized by (CST): Max Meraz MD on 9/25/2023 at 4:17 PM       XR ENDOSCOPY - N/C    Result Date: 9/25/2023  CONCLUSION:  Fluoroscopic guidance for bronchoscopy. LOCATION:  [facility]   Dictated by (CST): Camryn Johansen MD on 9/25/2023 at 3:59 PM     Finalized by (CST): Camryn Johansen MD on 9/25/2023 at 3:59 PM       CTA CHEST + CT ABD (W) + CT PEL (W) SH(CPT=71275/55045)    Result Date: 9/9/2023  CONCLUSION:   1. There is right perihilar masslike lymphadenopathy. There is associated obstruction of the right upper lobe pulmonary arteries. A distinct pulmonary embolus is not identified. 2. Multiple masses in the right middle lobe and nodule the right lower lobe are noted. These are significantly increased since prior CT chest examination at are suspicious for metastatic disease. Associated increased mediastinal lymphadenopathy is also  noted. 3. No evidence of an acute inflammatory process in the abdomen and pelvis. LOCATION:  [facility]   Dictated by ([facility]): Juan Carlos Bee MD on 9/09/2023 at 12:07 PM     Finalized by (CST): Juan Carlos Bee MD on 9/09/2023 at 12:40 PM          Assessment/Plan:  #1. Lung nodules  Ongoing and wax/waning for nearly a decade  Previous CT guided biopsy in 2016 with acute/chronic granulomatous inflammation  9/9/2023 CT chest with new RML nodules, question of right hilar LAD  9/25/2023 CT bronch imaging with new RUL GGO, decrease in nodules in RML.  Stable RLL and LLL nodules  9/25/2023 robotic assisted bronch with bx of RML and EBUS bx of 11R: RML path with hemosiderin laden macrophages and fibroelastic scarring. No malignancy. Cx remain ngtd  She has been treated with rheumatology Dr. 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 Her pathology results are not overly helpful  Will plan on repeat CT in 3months to reassess   In interim, suspect she had underlying autoimmune/inflammatory disorder leading to the wax/waning nodules but serologies and biopsies have been unhelpful. Will review with her rheumatologist.  Could also consider 2nd opinion at tertiary center - she will look into evaluation at [facility]    #2. Back pain  May be related to above inflammatory disorder vs other such as musculoskeletal  She does improve with prednisone. Will continue on prednisone 10 daily for now; will review with her rheumatologist regarding possible long term therapies and wean prednisone. Could also consider 2nd opinion at tertiary center - she will look into evaluation at [facility]    #3. Asthma  Reported hx of asthma, but asymptomatic  Remains off of any inhalers given lack of symptoms    Yan Gonzalez MD  10/10/2023    This visit is conducted using Telemedicine with live, interactive video and audio. Patient has been referred to the [facility] website at [email or web address] to review the yearly Consent to Treat document. Patient understands and accepts financial responsibility for any deductible, co-insurance and/or co-pays associated with this service.

## 2023-10-10 NOTE — PATIENT INSTRUCTIONS
Continue on the prednisone 10mg daily. You can take acetaminophen (tylenol) with the prednisone as well as your other pain medicines.    Obtain a repeat CT chest in 3months  I'll plan to reach out to Dr. Cayden Beltre

## 2023-10-13 ENCOUNTER — IMMUNIZATION (OUTPATIENT)
Dept: LAB | Age: 48
End: 2023-10-13
Attending: EMERGENCY MEDICINE
Payer: COMMERCIAL

## 2023-10-13 DIAGNOSIS — Z23 NEED FOR VACCINATION: Primary | ICD-10-CM

## 2023-10-13 PROCEDURE — 90471 IMMUNIZATION ADMIN: CPT

## 2023-10-13 PROCEDURE — 90686 IIV4 VACC NO PRSV 0.5 ML IM: CPT

## 2023-10-13 PROCEDURE — 90480 ADMN SARSCOV2 VAC 1/ONLY CMP: CPT

## 2023-10-17 ENCOUNTER — OFFICE VISIT (OUTPATIENT)
Dept: FAMILY MEDICINE CLINIC | Facility: CLINIC | Age: 48
End: 2023-10-17
Payer: COMMERCIAL

## 2023-10-17 VITALS
SYSTOLIC BLOOD PRESSURE: 116 MMHG | RESPIRATION RATE: 18 BRPM | TEMPERATURE: 97 F | HEART RATE: 90 BPM | DIASTOLIC BLOOD PRESSURE: 64 MMHG | BODY MASS INDEX: 28.65 KG/M2 | WEIGHT: 193.44 LBS | HEIGHT: 69 IN

## 2023-10-17 DIAGNOSIS — R91.8 LUNG NODULES: ICD-10-CM

## 2023-10-17 DIAGNOSIS — Z71.3 WEIGHT LOSS COUNSELING, ENCOUNTER FOR: ICD-10-CM

## 2023-10-17 DIAGNOSIS — M54.9 MID BACK PAIN: ICD-10-CM

## 2023-10-17 DIAGNOSIS — R25.2 MUSCLE CRAMP: ICD-10-CM

## 2023-10-17 DIAGNOSIS — E66.3 OVERWEIGHT (BMI 25.0-29.9): Primary | ICD-10-CM

## 2023-10-17 PROCEDURE — 99214 OFFICE O/P EST MOD 30 MIN: CPT | Performed by: NURSE PRACTITIONER

## 2023-10-17 PROCEDURE — 3078F DIAST BP <80 MM HG: CPT | Performed by: NURSE PRACTITIONER

## 2023-10-17 PROCEDURE — 3008F BODY MASS INDEX DOCD: CPT | Performed by: NURSE PRACTITIONER

## 2023-10-17 PROCEDURE — 3074F SYST BP LT 130 MM HG: CPT | Performed by: NURSE PRACTITIONER

## 2023-10-20 ENCOUNTER — LAB ENCOUNTER (OUTPATIENT)
Dept: LAB | Age: 48
End: 2023-10-20
Attending: NURSE PRACTITIONER
Payer: COMMERCIAL

## 2023-10-20 DIAGNOSIS — R25.2 MUSCLE CRAMP: ICD-10-CM

## 2023-10-20 LAB
ALBUMIN SERPL-MCNC: 3.1 G/DL (ref 3.4–5)
ALBUMIN/GLOB SERPL: 0.7 {RATIO} (ref 1–2)
ALP LIVER SERPL-CCNC: 88 U/L
ALT SERPL-CCNC: 26 U/L
ANION GAP SERPL CALC-SCNC: 4 MMOL/L (ref 0–18)
AST SERPL-CCNC: 9 U/L (ref 15–37)
BILIRUB SERPL-MCNC: 0.3 MG/DL (ref 0.1–2)
BUN BLD-MCNC: 9 MG/DL (ref 7–18)
CALCIUM BLD-MCNC: 8.8 MG/DL (ref 8.5–10.1)
CHLORIDE SERPL-SCNC: 105 MMOL/L (ref 98–112)
CO2 SERPL-SCNC: 29 MMOL/L (ref 21–32)
CREAT BLD-MCNC: 0.74 MG/DL
EGFRCR SERPLBLD CKD-EPI 2021: 100 ML/MIN/1.73M2 (ref 60–?)
FASTING STATUS PATIENT QL REPORTED: YES
GLOBULIN PLAS-MCNC: 4.2 G/DL (ref 2.8–4.4)
GLUCOSE BLD-MCNC: 85 MG/DL (ref 70–99)
MAGNESIUM SERPL-MCNC: 2.2 MG/DL (ref 1.6–2.6)
OSMOLALITY SERPL CALC.SUM OF ELEC: 284 MOSM/KG (ref 275–295)
POTASSIUM SERPL-SCNC: 3.5 MMOL/L (ref 3.5–5.1)
PROT SERPL-MCNC: 7.3 G/DL (ref 6.4–8.2)
SODIUM SERPL-SCNC: 138 MMOL/L (ref 136–145)

## 2023-10-20 PROCEDURE — 80053 COMPREHEN METABOLIC PANEL: CPT | Performed by: NURSE PRACTITIONER

## 2023-10-20 PROCEDURE — 83735 ASSAY OF MAGNESIUM: CPT | Performed by: NURSE PRACTITIONER

## 2023-10-28 ENCOUNTER — PATIENT MESSAGE (OUTPATIENT)
Dept: FAMILY MEDICINE CLINIC | Facility: CLINIC | Age: 48
End: 2023-10-28

## 2023-11-16 ENCOUNTER — TELEPHONE (OUTPATIENT)
Facility: CLINIC | Age: 48
End: 2023-11-16

## 2023-11-16 NOTE — TELEPHONE ENCOUNTER
Spoke with pt. States she has a CT scan ordered at [facility] to be done on 11/27. They are having problems getting authorization because she has a CT chest ordered to be done at [facility] that has already been authorized. She is asking that we call [facility] to cancel that CT. Will notify Dr. Jonnie Baca and if ok will proceed to try to have have auth removed.

## 2023-11-16 NOTE — TELEPHONE ENCOUNTER
Mail clink November 27,2023 for ct and needs to cancel the pre authorize for ct for marc.      To call Nacho Rahman  [phone]    To cancel the pre authorization for ct for  CATHERINE

## 2023-11-17 RX ORDER — PREDNISONE 10 MG/1
10 TABLET ORAL DAILY
Qty: 30 TABLET | Refills: 1 | OUTPATIENT
Start: 2023-11-17

## 2023-11-17 NOTE — TELEPHONE ENCOUNTER
Per Dr. Zofia Reyes: Garry Nimisha to cancel edward CT     I reached out to [facility] from [facility] prior auth and referrals. Notified pt is requesting we call insurance to remove authorization on prev authorized CT Chest. Per [facility]: Patients previously authorized CT Chest has been withdrawn with the benefit manager. Patients [facility] care team may now initiate a new case for the same or similar procedure code. Referral # [id number]. Pt called to notify of the above. LVM stating [facility] was sent with information. The above info was sent via [facility]. Pt advised to call if has any questions. Per Dr. Hill Army request. Frankie Starr has been canceled in EMR.

## 2023-11-17 NOTE — TELEPHONE ENCOUNTER
Last Televisit 10/10/23    Last rx'd       Medication Quantity Refills Start End   predniSONE 5 MG Oral Tab 90 tablet 1 10/10/2023    Sig:   Take 2 tablets (10 mg total) by mouth daily.       Per rx, pt has 1 refill left, too soon to refill

## 2023-12-11 ENCOUNTER — TELEPHONE (OUTPATIENT)
Dept: UROLOGY | Facility: CLINIC | Age: 48
End: 2023-12-11

## 2023-12-11 DIAGNOSIS — R30.0 DYSURIA: Primary | ICD-10-CM

## 2023-12-11 RX ORDER — SULFAMETHOXAZOLE AND TRIMETHOPRIM 800; 160 MG/1; MG/1
1 TABLET ORAL 2 TIMES DAILY
Qty: 14 TABLET | Refills: 0 | Status: SHIPPED | OUTPATIENT
Start: 2023-12-11

## 2023-12-11 NOTE — TELEPHONE ENCOUNTER
Telephone call received from patient. Patient complains of UTI signs and symptoms including urgency, frequency, dysuria x 3 days    Denies fever    Allergies and previous cultures reviewed    Pt was recently at [facility] and saw a pulmonologist for \"some lung issues\". He told her to stop her post coital NTF. Pt was prescribed this by her gyne prior to her first appt here. Looking back at ucx from 2016 to present, no bacteria grows in ucx. Has had either no growth, <10k gram pos, lactobacillus or GBS. Will discuss w/ ANGELITA Herman. Urine culture ordered, will test at [facility] lab. Encouraged PO hydration. All questions answered    Encouraged to call if symptoms worsen or fail to improve    Patient understands and agrees to plan      Has f/u appt w/ Dr Oliver Camargo 2/27/24.

## 2023-12-11 NOTE — TELEPHONE ENCOUNTER
Syed Marks 2906, PA-bactrim DS po BID x 7d. Plan to see what ucx looks like before deciding on a post coital abx. Pt verbalized an understanding and agrees w/ plan. All questions answered.

## 2023-12-12 ENCOUNTER — LAB ENCOUNTER (OUTPATIENT)
Dept: LAB | Age: 48
End: 2023-12-12
Attending: OBSTETRICS & GYNECOLOGY
Payer: COMMERCIAL

## 2023-12-12 DIAGNOSIS — R30.0 DYSURIA: ICD-10-CM

## 2023-12-12 PROCEDURE — 87077 CULTURE AEROBIC IDENTIFY: CPT

## 2023-12-12 PROCEDURE — 87086 URINE CULTURE/COLONY COUNT: CPT

## 2023-12-12 PROCEDURE — 87186 SC STD MICRODIL/AGAR DIL: CPT

## 2023-12-14 ENCOUNTER — TELEPHONE (OUTPATIENT)
Dept: UROLOGY | Facility: CLINIC | Age: 48
End: 2023-12-14

## 2023-12-14 RX ORDER — CEPHALEXIN 500 MG/1
500 CAPSULE ORAL 3 TIMES DAILY
Qty: 21 CAPSULE | Refills: 0 | Status: SHIPPED | OUTPATIENT
Start: 2023-12-14 | End: 2023-12-21

## 2023-12-14 NOTE — TELEPHONE ENCOUNTER
Incoming call from pt who is currently being treated for UTI with Bactrim. Pt reports Hives and itching with Bactrim and would like to stop taking it  On NTF suppression nightly per Dr Jordana Meng Keflex 500mg po tid then resume NTF nightly suppression. Discussed above with patient. Pt states, \"I can't take the macrodantin suppression any more as it affects my pulmonary function.   Per Mana Floyd NTF suppression

## 2023-12-15 ENCOUNTER — OFFICE VISIT (OUTPATIENT)
Facility: CLINIC | Age: 48
End: 2023-12-15
Payer: COMMERCIAL

## 2023-12-15 VITALS
HEIGHT: 69 IN | HEART RATE: 101 BPM | BODY MASS INDEX: 27.7 KG/M2 | SYSTOLIC BLOOD PRESSURE: 110 MMHG | RESPIRATION RATE: 16 BRPM | OXYGEN SATURATION: 97 % | DIASTOLIC BLOOD PRESSURE: 70 MMHG | WEIGHT: 187 LBS

## 2023-12-15 DIAGNOSIS — R91.8 LUNG NODULES: Primary | ICD-10-CM

## 2023-12-15 DIAGNOSIS — G89.29 CHRONIC BILATERAL LOW BACK PAIN, UNSPECIFIED WHETHER SCIATICA PRESENT: ICD-10-CM

## 2023-12-15 DIAGNOSIS — J45.20 MILD INTERMITTENT ASTHMA WITHOUT COMPLICATION: ICD-10-CM

## 2023-12-15 DIAGNOSIS — M54.50 CHRONIC BILATERAL LOW BACK PAIN, UNSPECIFIED WHETHER SCIATICA PRESENT: ICD-10-CM

## 2023-12-15 PROCEDURE — 3008F BODY MASS INDEX DOCD: CPT | Performed by: INTERNAL MEDICINE

## 2023-12-15 PROCEDURE — 3078F DIAST BP <80 MM HG: CPT | Performed by: INTERNAL MEDICINE

## 2023-12-15 PROCEDURE — 99214 OFFICE O/P EST MOD 30 MIN: CPT | Performed by: INTERNAL MEDICINE

## 2023-12-15 PROCEDURE — 3074F SYST BP LT 130 MM HG: CPT | Performed by: INTERNAL MEDICINE

## 2023-12-15 RX ORDER — ALBUTEROL SULFATE 90 UG/1
AEROSOL, METERED RESPIRATORY (INHALATION)
COMMUNITY
Start: 2023-11-21

## 2023-12-15 RX ORDER — METHOCARBAMOL 500 MG/1
500 TABLET, FILM COATED ORAL AS NEEDED
COMMUNITY
Start: 2023-11-07

## 2023-12-15 RX ORDER — BENZONATATE 200 MG/1
200 CAPSULE ORAL 3 TIMES DAILY PRN
COMMUNITY
Start: 2023-11-21

## 2023-12-15 RX ORDER — ONABOTULINUMTOXINA 100 [USP'U]/1
INJECTION, POWDER, LYOPHILIZED, FOR SOLUTION INTRADERMAL; INTRAMUSCULAR
COMMUNITY
Start: 2023-11-14

## 2023-12-15 RX ORDER — MELOXICAM 15 MG/1
15 TABLET ORAL
COMMUNITY
Start: 2023-11-10

## 2023-12-16 NOTE — PATIENT INSTRUCTIONS
Plan to keep weaning the prednisone as advised - 2.5mg wean every 14 days. Use ibuprofen or other over the counter NSAID medicines as needed for your pain. Plan on a PET/CT scan in March/April 2024 - if you get ill (sinus infection, cold, respiratory illness or other illness) you should postpone the scan for at least 4-6 weeks after you have recovered.  Call with questions/concerns

## 2023-12-21 PROCEDURE — 87624 HPV HI-RISK TYP POOLED RSLT: CPT | Performed by: OBSTETRICS & GYNECOLOGY

## 2023-12-21 PROCEDURE — 88175 CYTOPATH C/V AUTO FLUID REDO: CPT | Performed by: OBSTETRICS & GYNECOLOGY

## 2023-12-22 ENCOUNTER — TELEPHONE (OUTPATIENT)
Dept: FAMILY MEDICINE CLINIC | Facility: CLINIC | Age: 48
End: 2023-12-22

## 2023-12-22 NOTE — TELEPHONE ENCOUNTER
I checked both refrigerators--we do not have any samples    Tc to pt. Advised of this. She voiced understanding. She sts she has checked multiple pharmacies and all are on back order. Sts it is exhausting to keep checking around. Asking if she should try another medication? Reports she was on phentermine previously. Was on various doses. Asks if you recommend another injectable? I suggested she check w insurance to see if another one is covered--does not want to do this. Sts she works w [employer] and she has an odilia where she can type in the med and the dose and see if it is covered or not. She is aware you are off today and not back after the holiday.     Please advise thx

## 2023-12-22 NOTE — TELEPHONE ENCOUNTER
Pt states Ozempic is on backorder and wants to know if she can get another sample box like she has in the past.  Pt will be out of medication tomorrow

## 2023-12-26 NOTE — TELEPHONE ENCOUNTER
S/w Renu  She has one sample that she had left. Ok to give to pt but going forward should come in to discuss options as we are not sure if further samples will be rec'd and we are not sure the supply at pharmacies going forward. Tc to pt. Discussed above. She voiced understanding. Sample left up front for pt to  today.

## 2024-01-10 ENCOUNTER — OFFICE VISIT (OUTPATIENT)
Dept: FAMILY MEDICINE CLINIC | Facility: CLINIC | Age: 49
End: 2024-01-10
Payer: COMMERCIAL

## 2024-01-10 VITALS
TEMPERATURE: 98 F | BODY MASS INDEX: 30.21 KG/M2 | DIASTOLIC BLOOD PRESSURE: 64 MMHG | HEIGHT: 69 IN | HEART RATE: 100 BPM | RESPIRATION RATE: 16 BRPM | WEIGHT: 204 LBS | SYSTOLIC BLOOD PRESSURE: 100 MMHG

## 2024-01-10 DIAGNOSIS — E66.9 OBESITY (BMI 30-39.9): Primary | ICD-10-CM

## 2024-01-10 DIAGNOSIS — Z79.899 MEDICATION MANAGEMENT: ICD-10-CM

## 2024-01-10 DIAGNOSIS — Z23 NEED FOR VACCINATION: ICD-10-CM

## 2024-01-10 DIAGNOSIS — Z71.3 WEIGHT LOSS COUNSELING, ENCOUNTER FOR: ICD-10-CM

## 2024-01-10 PROCEDURE — 90471 IMMUNIZATION ADMIN: CPT | Performed by: NURSE PRACTITIONER

## 2024-01-10 PROCEDURE — 3074F SYST BP LT 130 MM HG: CPT | Performed by: NURSE PRACTITIONER

## 2024-01-10 PROCEDURE — 90715 TDAP VACCINE 7 YRS/> IM: CPT | Performed by: NURSE PRACTITIONER

## 2024-01-10 PROCEDURE — 3078F DIAST BP <80 MM HG: CPT | Performed by: NURSE PRACTITIONER

## 2024-01-10 PROCEDURE — 3008F BODY MASS INDEX DOCD: CPT | Performed by: NURSE PRACTITIONER

## 2024-01-10 PROCEDURE — 99214 OFFICE O/P EST MOD 30 MIN: CPT | Performed by: NURSE PRACTITIONER

## 2024-01-10 NOTE — PROGRESS NOTES
Manisha Villarreal is a 48 year old female.  HPI:   Patient presents today to discuss options for weight loss. She has been taking Ozempic 2 mg weekly--and has been doing well on this. She has been having difficulties in obtaining this medication, has 2 doses left of sample. She has previously taken Ozempic with Phentermine and did well with this combination--she stopped Phentermine spring 2023 when dose of Ozempic was increased to 2 mg weekly. She has seen weight loss clinic previously--has taken Topamax and Contrave. Notes she does well with the weekly injections vs remembering to take medication daily/several times per day. Monitoring diet. No chest pain or shortness of breath.  She did have an EKG in 2020--SR, minimal voltage criteria for LVH, may be normal variant- abnormal EKG. EKG in 2013 also notes possible left atrial enlargement (rhythm on this EKG was NSR with sinus arrhythmia). She has not had an echo previously.  Tdap- 2012- due.    Wt Readings from Last 6 Encounters:   01/10/24 204 lb (92.5 kg)   12/21/23 200 lb (90.7 kg)   12/15/23 187 lb (84.8 kg)   10/17/23 193 lb 7.3 oz (87.8 kg)   10/02/23 187 lb (84.8 kg)   09/25/23 188 lb (85.3 kg)     Current Outpatient Medications   Medication Sig Dispense Refill    semaglutide 4 MG/3ML Subcutaneous Solution Pen-injector Inject 1 mg into the skin once a week. 9 mL 0    Levonorgest-Eth Estrad 91-Day (CAMRESE LO) 0.1-0.02 & 0.01 MG Oral Tab Take 1 tablet by mouth daily. 91 tablet 3    Meloxicam 15 MG Oral Tab 1 tablet (15 mg total). Prn      methocarbamol 500 MG Oral Tab Take 1 tablet (500 mg total) by mouth as needed.      predniSONE 5 MG Oral Tab Take 2 tablets (10 mg total) by mouth daily. (Patient taking differently: Take 0.5 tablets (2.5 mg total) by mouth daily.) 90 tablet 1    Multiple Vitamin (MULTI-DAY VITAMINS) Oral Tab Take by mouth daily.      IRON OR daily.      tiZANidine 2 MG Oral Tab Take 1 tablet (2 mg total) by mouth. 1/2 tab prn       MYRBETRIQ 50 MG Oral Tablet 24 Hr Take 1 tablet (50 mg total) by mouth daily. Once daily 90 tablet 3    montelukast 10 MG Oral Tab TAKE 1 TABLET BY MOUTH EVERY DAY AT NIGHT (Patient taking differently: prn) 30 tablet 0    celecoxib 100 MG Oral Cap Take 1 capsule (100 mg total) by mouth as needed. Takes once daily      estradiol (ESTRACE) 0.1 MG/GM Vaginal Cream 1/2 gram twice weekly per vagina 1 each 3    Acidophilus/Pectin Oral Cap Take 1 capsule by mouth daily. Takes prn      Cholecalciferol (VITAMIN D3) 5000 UNITS Oral Tab Take 1 tablet (5,000 Units total) by mouth daily.      BOTOX 100 units Injection Recon Soln  (Patient not taking: Reported on 1/10/2024)      Fluocinolone Acetonide 0.01 % Otic Oil Place in ear(s) as needed. (Patient not taking: Reported on 12/21/2023)        Past Medical History:   Diagnosis Date    Anesthesia complication     slow to wake after general anesthesia    Arthritis     Asthma     exercise induced    Celiac disease     Gallstones     H/O pleurisy 01/24/2015    treated with course of steroids-followed by Dr Hancock/Pulmonologist-surgeon aware.    Vasculitis (HCC)     \"wagners vasculitis\"    Visual impairment     glasses and contacts      Social History:  Social History     Socioeconomic History    Marital status:    Tobacco Use    Smoking status: Never    Smokeless tobacco: Never   Vaping Use    Vaping Use: Never used   Substance and Sexual Activity    Alcohol use: No    Drug use: No    Sexual activity: Yes     Partners: Male     Birth control/protection: OCP   Other Topics Concern    Caffeine Concern No     Comment: 1-2 per week    Exercise Yes     Comment: silva 2 x a week        REVIEW OF SYSTEMS:   GENERAL HEALTH: feels well otherwise  RESPIRATORY: denies shortness of breath with exertion  CARDIOVASCULAR: denies chest pain on exertion  GI: denies abdominal pain  NEURO: denies headaches  Musculoskeletal: No motor deficits  EXAM:   /64   Pulse 100   Temp 97.8 °F  (36.6 °C) (Temporal)   Resp 16   Ht 5' 9\" (1.753 m)   Wt 204 lb (92.5 kg)   LMP 09/18/2023 (Approximate)   BMI 30.13 kg/m²   GENERAL: well developed, well nourished,in no apparent distress  HEENT: TM clear bilaterally, nose no congestion, throat clear no erythema without mass.   EYES: PERRLA, EOM intact, sclera clear without injection  NECK: supple,no adenopathy  LUNGS: clear to auscultation no rales, rhonchi or wheezes  CARDIO: RRR without murmur  EXTREMITIES: no cyanosis, clubbing or edema +2 posterior tibial pulses.   Musculoskeletal: No gross deficit  Neurological: nerves II through XII grossly intact no sensorimotor deficit  Psychological: Mood and affect are normal.  Good communication skills.  ASSESSMENT AND PLAN:     Encounter Diagnoses   Name Primary?    Obesity (BMI 30-39.9) Yes    Weight loss counseling, encounter for     Medication management     Need for vaccination        Orders Placed This Encounter   Procedures    TdaP (Adacel, Boostrix) [28850]    Immunization Admin Counseling, 1st Component, 18 years and older       Meds & Refills for this Visit:  Requested Prescriptions     Signed Prescriptions Disp Refills    semaglutide 4 MG/3ML Subcutaneous Solution Pen-injector 9 mL 0     Sig: Inject 1 mg into the skin once a week.       Imaging & Consults:  TETANUS, DIPHTHERIA TOXOIDS AND ACELLULAR PERTUSIS VACCINE (TDAP), >7 YEARS, IM USE  EKG 12-LEAD    Follow Up with:  No follow-up provider specified.    1. Obesity (BMI 30-39.9)  - semaglutide 4 MG/3ML Subcutaneous Solution Pen-injector; Inject 1 mg into the skin once a week.  Dispense: 9 mL; Refill: 0    2. Weight loss counseling, encounter for    3. Medication management  - EKG 12 Lead performed at [facility]; Future    4. Need for vaccination  - TdaP (Adacel, Boostrix) [51910]  - Immunization Admin Counseling, 1st Component, 18 years and older      Tdap- updated today.  Discussed options for weight loss with the patient today. Discussed  options of Wegovy (previously quite expensive with pt's insurance), Zepbound- pt will check with insurance re: coverage.   Patient did well with combination of Ozempic 1 mg and Phentermine. I discussed with the patient I am ok with reducing the dose of the Ozempic- she may still have difficulties in obtaining the medication from her pharmacy and may need to call other pharmacies to locate the medication. Reviewed EKG in 2013 and 2020 with the patient. Discussed repeating EKG and pending EKG resuming Phentermine.   Continue to focus on healthy eating, routine exercise, weight loss. BMI 30.13.  Await EKG for further recommendations.      The patient indicates understanding of these issues and agrees to the plan.  The patient is asked to return pending above.

## 2024-01-16 ENCOUNTER — VIRTUAL PHONE E/M (OUTPATIENT)
Dept: UROLOGY | Facility: CLINIC | Age: 49
End: 2024-01-16
Attending: OBSTETRICS & GYNECOLOGY
Payer: COMMERCIAL

## 2024-01-16 DIAGNOSIS — N95.2 POSTMENOPAUSAL ATROPHIC VAGINITIS: ICD-10-CM

## 2024-01-16 DIAGNOSIS — N39.0 FREQUENT UTI: Primary | ICD-10-CM

## 2024-01-16 DIAGNOSIS — N39.41 URGE INCONTINENCE: ICD-10-CM

## 2024-01-16 DIAGNOSIS — N81.84 PELVIC MUSCLE WASTING: ICD-10-CM

## 2024-01-16 RX ORDER — VIBEGRON 75 MG/1
75 TABLET, FILM COATED ORAL DAILY
Qty: 30 TABLET | Refills: 11 | Status: SHIPPED | OUTPATIENT
Start: 2024-01-16 | End: 2024-02-15

## 2024-01-16 NOTE — PROGRESS NOTES
Patient to follow up Frequent UTI  Given circumstances surrounding COVID-19 this visit is being conducted as a televisit with pt's consent.  Pt in safe, private location prior to beginning visit. Provider located in office setting.    She is currently using vaginal estrogen twice a week   Previously on macrodantin suppression, stopped NTF after visit with pulmonologist, hx pulmonary nodules, PFTs reported normal   Coitus possible trigger  Sits for long periods of time   Taking D- Mannose  No recent UTI  Denies any current s/sx of UTI  Bowels loose, citracel daily     Recent ucx:  12/12/23 >100k Ecoli tx Keflex TID 7 days     UUI: takes Myrbetriq 50 mg daily  Previously tried Oxybutynin 10 mg daily - side effects dizziness   States Myrbetriq not working as well as previously  Increased frequency, urgency, leakage    Urogynecology Summary:       LMP 09/18/2023 (Approximate)     Impression/Plan:    ICD-10-CM    1. Frequent UTI  N39.0       2. Urge incontinence  N39.41 Vibegron (GEMTESA) 75 MG Oral Tab      3. Postmenopausal atrophic vaginitis  N95.2       4. Pelvic muscle wasting  N81.84           Discussion Items:   Discussed mgmt of vulvovaginal atrophy with vaginal estrogen cream. Reviewed associated benefits, risks, alternatives, and goals. Recommend low dose twice weekly mgmt   Discussed management of recurrent urinary tract infections. Discussed use of pharmacologic treatment options for suppression therapy. Risks vs benefits reviewed with patient   Discussed dietary and behavioral modifications for mgmt of urinary symptoms  Discussed weight management and benefits of weight loss on urinary symptoms  Reviewed AUA/SUFU guidelines on mgmt of non-neurogenic OAB  Discussed pharmacologic and nonpharmacologic mgmt options of urinary symptoms - reviewed risks, benefits, alternatives, and goals of treatment  Discussed specific risks related to OAB meds including, but not limited to dry mouth, constipation, blurry vision,  cognitive changes, and BP elevation.    Treatment Plan, Non-surgical:   Stop Myrbetriq  Trial Gemtesa 75 mg daily  Patient does not want medication for UTI suppression at this time  Monitor for s/sx of UTI, call office if s/sx of UTI  Bladder diet/drill  Bowel regimen reviewed  All questions answered  She understands and agrees to plan    Follow up in 6 weeks for UUI, keep existing appointment with Dr. Margie Lewis, PA-C

## 2024-01-17 ENCOUNTER — EKG ENCOUNTER (OUTPATIENT)
Dept: LAB | Age: 49
End: 2024-01-17
Attending: NURSE PRACTITIONER
Payer: COMMERCIAL

## 2024-01-17 DIAGNOSIS — Z79.899 MEDICATION MANAGEMENT: ICD-10-CM

## 2024-01-17 LAB
ATRIAL RATE: 91 BPM
P AXIS: 66 DEGREES
P-R INTERVAL: 152 MS
Q-T INTERVAL: 358 MS
QRS DURATION: 78 MS
QTC CALCULATION (BEZET): 440 MS
R AXIS: 8 DEGREES
T AXIS: 51 DEGREES
VENTRICULAR RATE: 91 BPM

## 2024-01-17 PROCEDURE — 93010 ELECTROCARDIOGRAM REPORT: CPT | Performed by: INTERNAL MEDICINE

## 2024-01-17 PROCEDURE — 93005 ELECTROCARDIOGRAM TRACING: CPT

## 2024-01-19 ENCOUNTER — PATIENT MESSAGE (OUTPATIENT)
Dept: FAMILY MEDICINE CLINIC | Facility: CLINIC | Age: 49
End: 2024-01-19

## 2024-01-19 DIAGNOSIS — E66.9 OBESITY (BMI 30-39.9): Primary | ICD-10-CM

## 2024-01-19 RX ORDER — PHENTERMINE HYDROCHLORIDE 15 MG/1
15 CAPSULE ORAL EVERY MORNING
Qty: 30 CAPSULE | Refills: 0 | Status: SHIPPED | OUTPATIENT
Start: 2024-01-19

## 2024-01-19 NOTE — TELEPHONE ENCOUNTER
From: Manisha Villarreal  To: Renu Jackson  Sent: 1/19/2024 9:23 AM CST  Subject: Phentermine 15mg    Ryder Parker, I found a pharmacy that has the 15mg Phentermine. It is the [facility] in [place]… address is [id number]     Can you send the prescription there?     Thank you!  Manisha Villarreal

## 2024-01-22 RX ORDER — PREDNISONE 2.5 MG/1
2.5 TABLET ORAL DAILY
Qty: 30 TABLET | Refills: 1 | Status: SHIPPED | OUTPATIENT
Start: 2024-01-22

## 2024-01-23 ENCOUNTER — TELEPHONE (OUTPATIENT)
Dept: FAMILY MEDICINE CLINIC | Facility: CLINIC | Age: 49
End: 2024-01-23

## 2024-01-23 DIAGNOSIS — E66.9 OBESITY (BMI 30-39.9): ICD-10-CM

## 2024-01-23 RX ORDER — PHENTERMINE HYDROCHLORIDE 15 MG/1
15 CAPSULE ORAL EVERY MORNING
Qty: 30 CAPSULE | Refills: 0 | Status: SHIPPED | OUTPATIENT
Start: 2024-01-23

## 2024-01-23 NOTE — TELEPHONE ENCOUNTER
I called [facility] Address: [address] [place] [address]  Phone: [phone].   Spoke with pharmacist Brigdio- they have the Phentermine 15 mg capsules in stock. Rx sent to this location. Please inform pt.

## 2024-01-23 NOTE — TELEPHONE ENCOUNTER
Received fax from pharmacy saying that Phentermine is backordered/unavailable with no release date. They are asking for alternative.

## 2024-02-23 ENCOUNTER — OFFICE VISIT (OUTPATIENT)
Dept: FAMILY MEDICINE CLINIC | Facility: CLINIC | Age: 49
End: 2024-02-23
Payer: COMMERCIAL

## 2024-02-23 VITALS
HEART RATE: 99 BPM | SYSTOLIC BLOOD PRESSURE: 116 MMHG | HEIGHT: 69 IN | RESPIRATION RATE: 18 BRPM | BODY MASS INDEX: 29.62 KG/M2 | WEIGHT: 200 LBS | DIASTOLIC BLOOD PRESSURE: 70 MMHG

## 2024-02-23 DIAGNOSIS — Z13.29 SCREENING FOR THYROID DISORDER: ICD-10-CM

## 2024-02-23 DIAGNOSIS — Z13.89 SCREENING FOR GENITOURINARY CONDITION: ICD-10-CM

## 2024-02-23 DIAGNOSIS — Z00.00 WELLNESS EXAMINATION: Primary | ICD-10-CM

## 2024-02-23 DIAGNOSIS — E55.9 VITAMIN D DEFICIENCY: ICD-10-CM

## 2024-02-23 DIAGNOSIS — E53.8 VITAMIN B12 DEFICIENCY: ICD-10-CM

## 2024-02-23 PROCEDURE — 3008F BODY MASS INDEX DOCD: CPT | Performed by: FAMILY MEDICINE

## 2024-02-23 PROCEDURE — 3078F DIAST BP <80 MM HG: CPT | Performed by: FAMILY MEDICINE

## 2024-02-23 PROCEDURE — 99396 PREV VISIT EST AGE 40-64: CPT | Performed by: FAMILY MEDICINE

## 2024-02-23 PROCEDURE — 3074F SYST BP LT 130 MM HG: CPT | Performed by: FAMILY MEDICINE

## 2024-02-23 NOTE — PROGRESS NOTES
CHIEF COMPLAINT   Complete physical  HPI:   Manisha Villarreal is a 48 year old female who presents for a complete physical exam.   Not on phentermine currently.  Just using the Ozempic for weight loss.    Dr. Garber - gyne  Dr. Arreguin - pulmonary. PET pending in March.   Dr. Ferreira - rheumatology    Mammogram scheduled.     Celiac.  Hx of B12 and vitamin D deficiency.      Wt Readings from Last 6 Encounters:   02/23/24 200 lb (90.7 kg)   01/10/24 204 lb (92.5 kg)   12/21/23 200 lb (90.7 kg)   12/15/23 187 lb (84.8 kg)   10/17/23 193 lb 7.3 oz (87.8 kg)   10/02/23 187 lb (84.8 kg)     Body mass index is 29.53 kg/m².     Cholesterol, Total (mg/dL)   Date Value   05/19/2023 171   09/17/2022 194   12/22/2020 158     CHOLESTEROL (mg/dL)   Date Value   08/29/2012 150     HDL Cholesterol (mg/dL)   Date Value   05/19/2023 46   09/17/2022 49   12/22/2020 48     HDL CHOL (mg/dL)   Date Value   08/29/2012 28 (L)     LDL Cholesterol (mg/dL)   Date Value   05/19/2023 106 (H)   09/17/2022 125 (H)   12/22/2020 93     LDL CHOLESTROL (mg/dL)   Date Value   08/29/2012 101     AST (U/L)   Date Value   10/20/2023 9 (L)   09/09/2023 48 (H)   08/19/2023 19   08/11/2020 17   03/25/2019 21   11/02/2018 19   10/20/2013 47 (H)   10/04/2013 49 (H)   09/25/2013 65 (H)     ALT (U/L)   Date Value   10/20/2023 26   09/09/2023 124 (H)   08/19/2023 42   08/11/2020 23   03/25/2019 29   11/02/2018 27   10/20/2013 134 (H)   10/04/2013 132 (H)   09/25/2013 198 (H)        Current Outpatient Medications   Medication Sig Dispense Refill    Levonorgest-Eth Estrad 91-Day (LOJAIMIESS) 0.1-0.02 & 0.01 MG Oral Tab TAKE 1 TABLET DAILY 91 tablet 2    semaglutide 4 MG/3ML Subcutaneous Solution Pen-injector Inject 1 mg into the skin once a week. 9 mL 0    Meloxicam 15 MG Oral Tab 1 tablet (15 mg total). Prn      methocarbamol 500 MG Oral Tab Take 1 tablet (500 mg total) by mouth as needed.      Multiple Vitamin (MULTI-DAY VITAMINS) Oral Tab Take by mouth  daily.      IRON OR daily.      tiZANidine 2 MG Oral Tab Take 1 tablet (2 mg total) by mouth. 1/2 tab prn      montelukast 10 MG Oral Tab TAKE 1 TABLET BY MOUTH EVERY DAY AT NIGHT (Patient taking differently: prn) 30 tablet 0    celecoxib 100 MG Oral Cap Take 1 capsule (100 mg total) by mouth as needed. Takes once daily      estradiol (ESTRACE) 0.1 MG/GM Vaginal Cream 1/2 gram twice weekly per vagina 1 each 3    Cholecalciferol (VITAMIN D3) 5000 UNITS Oral Tab Take 1 tablet (5,000 Units total) by mouth daily.      Phentermine HCl 15 MG Oral Cap Take 1 capsule (15 mg total) by mouth every morning. (Patient not taking: Reported on 2/23/2024) 30 capsule 0    BOTOX 100 units Injection Recon Soln  (Patient not taking: Reported on 1/10/2024)      Fluocinolone Acetonide 0.01 % Otic Oil Place in ear(s) as needed. (Patient not taking: Reported on 12/21/2023)      Acidophilus/Pectin Oral Cap Take 1 capsule by mouth daily. Takes prn        Allergies   Allergen Reactions    Azathioprine RASH     Nausea, diffuse, lip swelling, low BP    Bactrim [Sulfamethoxazole W/Trimethoprim] HIVES    Cats Claw, Uncaria Tomentosa WHEEZING    Gluten Flour      Gets very tired from gluten in foods and then will have stomach issues      Past Medical History:   Diagnosis Date    Anesthesia complication     slow to wake after general anesthesia    Arthritis     Asthma (HCC)     exercise induced    Celiac disease (HCC)     Gallstones     H/O pleurisy 01/24/2015    treated with course of steroids-followed by Dr Hancock/Pulmonologist-surgeon aware.    Vasculitis (HCC)     \"wagners vasculitis\"    Visual impairment     glasses and contacts      Past Surgical History:   Procedure Laterality Date    COLONOSCOPY      2014    DILATION/CURETTAGE,DIAGNOSTIC      EXCISION TURBINATE,SUBMUCOUS  11/27/2020    EYE SURGERY Right 03/2015    repair of retinal tear    LAPAROSCOPIC CHOLECYSTECTOMY  10/10/2013    Procedure: LAPAROSCOPIC CHOLECYSTECTOMY;  Surgeon:  Elpidio Messer MD;  Location:  MAIN OR    OTHER SURGICAL HISTORY  02/2015    Bladder Mesh    OTHER SURGICAL HISTORY Left 09/30/2022    breast bx (benign)    REPAIR OF NASAL SEPTUM  11/27/2020    UPPER GI ENDOSCOPY,EXAM      biopsies       Family History   Problem Relation Age of Onset    Heart Attack Mother 66        Acute MI    High Cholesterol Mother         Hypercholesterolemia    Heart Disease Mother     Obesity Mother     High Cholesterol Father         Hypercholesterolemia    Heart Disorder Father         Palpitations    Obesity Sister     Diabetes Brother     Diabetes Brother     Breast Cancer Other     Cancer Neg       Social History:   Social History     Socioeconomic History    Marital status:    Tobacco Use    Smoking status: Never    Smokeless tobacco: Never   Vaping Use    Vaping Use: Never used   Substance and Sexual Activity    Alcohol use: No    Drug use: No    Sexual activity: Yes     Partners: Male     Birth control/protection: OCP   Other Topics Concern    Caffeine Concern No     Comment: 1-2 per week    Exercise Yes     Comment: silva 2 x a week        REVIEW OF SYSTEMS:   GENERAL: feels well otherwise  SKIN: no complaint of any unusual skin lesions  EYES: no complaint of blurred vision or double vision  HEENT: no complaint of nasal congestion, sinus pain or ST  LUNGS: no complaint of shortness of breath with exertion  CARDIOVASCULAR: no complaint of chest pain on exertion  GI: no complaint of pain,denies heartburn  : no complaints of vaginal discharge or urinary complaints  MUSCULOSKELETAL: no complaint of back pain  NEURO: no complaint of headaches  PSYCHE: no complaint ofdepression or anxiety    EXAM:   /70   Pulse 99   Resp 18   Ht 5' 9\" (1.753 m)   Wt 200 lb (90.7 kg)   LMP 09/18/2023 (Approximate)   BMI 29.53 kg/m²   Body mass index is 29.53 kg/m².   GENERAL: well developed, well nourished,in no apparent distress  SKIN: no rashes,no suspicious lesions  HEENT:  atraumatic, normocephalic,ears and throat are clear  EYES:PERRLA, EOMI, conjunctiva are clear  NECK: supple,no adenopathy, no thyroid masses.  BREAST:DEFERRED TO GYNE  LUNGS: clear to auscultation; no rhonchi, rales, or wheezing  CARDIO: RRR without murmur  GI: no masses, organomegaly or tenderness  :DEFERRED TO GYNE   MUSCULOSKELETAL: No obvious joint deformity or swelling.  Normal gait.  EXTREMITIES: no cyanosis, clubbing or edema  NEURO: Oriented times three,cranial nerves are grossly intact,motor and sensory are grossly intact    ASSESSMENT AND PLAN:   Manisha Villarreal is a 48 year old female who presents for a complete physical exam.  Pap and pelvic deferred to gyne per patient request.   Pt' s weight is Body mass index is 29.53 kg/m².. Recommended regular exercise.  The patient indicates understanding of these issues and agrees to the plan.   Encounter Diagnoses   Name Primary?    Wellness examination Yes    Screening for thyroid disorder     Screening for genitourinary condition     Vitamin B12 deficiency     Vitamin D deficiency        Orders Placed This Encounter   Procedures    CBC With Differential With Platelet    Comp Metabolic Panel (14)    Lipid Panel    TSH W Reflex To Free T4    Urinalysis with Culture Reflex    Hemoglobin A1C [E]    Vitamin D [E]    Vitamin B12 [E]       Follow up pending above.

## 2024-02-27 ENCOUNTER — VIRTUAL PHONE E/M (OUTPATIENT)
Dept: UROLOGY | Facility: CLINIC | Age: 49
End: 2024-02-27
Attending: OBSTETRICS & GYNECOLOGY
Payer: COMMERCIAL

## 2024-02-27 DIAGNOSIS — Z98.890 POST-OPERATIVE STATE: ICD-10-CM

## 2024-02-27 DIAGNOSIS — N39.0 FREQUENT UTI: ICD-10-CM

## 2024-02-27 DIAGNOSIS — N39.41 URGE INCONTINENCE: Primary | ICD-10-CM

## 2024-02-27 DIAGNOSIS — N95.2 POSTMENOPAUSAL ATROPHIC VAGINITIS: ICD-10-CM

## 2024-02-27 DIAGNOSIS — N81.84 PELVIC MUSCLE WASTING: ICD-10-CM

## 2024-02-27 DIAGNOSIS — B00.9 HSV INFECTION: ICD-10-CM

## 2024-02-27 DIAGNOSIS — R35.1 NOCTURIA: ICD-10-CM

## 2024-02-27 RX ORDER — VALACYCLOVIR HYDROCHLORIDE 500 MG/1
500 TABLET, FILM COATED ORAL 2 TIMES DAILY
Qty: 6 TABLET | Refills: 3 | Status: SHIPPED | OUTPATIENT
Start: 2024-02-27

## 2024-02-27 NOTE — PATIENT INSTRUCTIONS
[email or web address]    [email or web address]    [email or web address]    [email or web address]

## 2024-02-27 NOTE — PROGRESS NOTES
Patient to follow up UTIs, UGA, UUI  Given circumstances surrounding COVID-19 this visit is being conducted as a televisit with pt's consent.  Pt in safe, private environment for televisit, provider located in the office setting    She is currently using vag estrogen twice weekly  Stopped NTF  Using d-mannose powder  No recent UTIs  Last UTI 12/12/23    She reports +improvement    Was seen at [place] w/ lung nodules and stopped NTF given that recommendation    Bowels reg    S/p MUS, cysto    HSV exposure    +stress, ex- passed away    Myrbetriq 50mg daily, nocturia persists    Impression/Plan:    ICD-10-CM    1. Urge incontinence  N39.41       2. Frequent UTI  N39.0       3. Postmenopausal atrophic vaginitis  N95.2       4. Pelvic muscle wasting  N81.84       5. Post-operative state  Z98.890       6. HSV infection  B00.9 valACYclovir 500 MG Oral Tab      7. Nocturia  R35.1           Discussion Items:   Discussed mgmt of vulvovaginal atrophy with vaginal estrogen cream. Reviewed associated benefits, risks, alternatives, and goals. Recommend low dose twice weekly mgmt   Cont vag estrogen twice weekly    H/o HSV  Valtrex 500mg BID x3d with viral prodrome sx    Discussed management of recurrent urinary tract infections. Discussed use of pharmacologic treatment options for suppression therapy. Risks vs benefits reviewed with patient   Cont d-mannose    Bowel management reviewed. Discussed using fiber daily w/ addition of miralax as needed    Discussed dietary and behavioral modifications for mgmt of urinary symptoms  Discussed weight management and benefits of weight loss on urinary symptoms  Reviewed AUA/SUFU guidelines on mgmt of non-neurogenic OAB  Discussed pharmacologic and nonpharmacologic mgmt options of urinary symptoms - reviewed risks, benefits, alternatives, and goals of treatment  Discussed specific risks related to OAB meds including, but not limited to dry mouth, constipation, blurry vision, cognitive  changes, and BP elevation.  Cont myrbetriq 50mg  Nocturia info provided    Consider bladder botox vs PTNS (would like to pursue botox pending coverage)    All questions answered  She understands and agrees to plan    Return in about 6 months (around 8/27/2024).    Susana Hanson DO, FACOG, FACS

## 2024-03-15 ENCOUNTER — LAB ENCOUNTER (OUTPATIENT)
Dept: LAB | Age: 49
End: 2024-03-15
Attending: FAMILY MEDICINE
Payer: COMMERCIAL

## 2024-03-15 DIAGNOSIS — E66.3 OVERWEIGHT (BMI 25.0-29.9): ICD-10-CM

## 2024-03-15 DIAGNOSIS — E53.8 VITAMIN B12 DEFICIENCY: ICD-10-CM

## 2024-03-15 DIAGNOSIS — E55.9 VITAMIN D DEFICIENCY: ICD-10-CM

## 2024-03-15 DIAGNOSIS — M31.30 GRANULOMATOSIS WITH POLYANGIITIS WITHOUT RENAL INVOLVEMENT (HCC): ICD-10-CM

## 2024-03-15 DIAGNOSIS — R91.8 PULMONARY NODULES: ICD-10-CM

## 2024-03-15 DIAGNOSIS — K90.0 CELIAC DISEASE (HCC): ICD-10-CM

## 2024-03-15 LAB
ALBUMIN SERPL-MCNC: 3.2 G/DL (ref 3.4–5)
ALBUMIN/GLOB SERPL: 0.7 {RATIO} (ref 1–2)
ALP LIVER SERPL-CCNC: 110 U/L
ALT SERPL-CCNC: 25 U/L
ANION GAP SERPL CALC-SCNC: 3 MMOL/L (ref 0–18)
AST SERPL-CCNC: 12 U/L (ref 15–37)
BASOPHILS # BLD AUTO: 0.06 X10(3) UL (ref 0–0.2)
BASOPHILS NFR BLD AUTO: 0.6 %
BILIRUB SERPL-MCNC: 0.4 MG/DL (ref 0.1–2)
BILIRUB UR QL STRIP.AUTO: NEGATIVE
BUN BLD-MCNC: 7 MG/DL (ref 9–23)
CALCIUM BLD-MCNC: 9 MG/DL (ref 8.5–10.1)
CHLORIDE SERPL-SCNC: 110 MMOL/L (ref 98–112)
CHOLEST SERPL-MCNC: 193 MG/DL (ref ?–200)
CLARITY UR REFRACT.AUTO: CLEAR
CO2 SERPL-SCNC: 24 MMOL/L (ref 21–32)
COLOR UR AUTO: YELLOW
CREAT BLD-MCNC: 0.79 MG/DL
EGFRCR SERPLBLD CKD-EPI 2021: 92 ML/MIN/1.73M2 (ref 60–?)
EOSINOPHIL # BLD AUTO: 0.17 X10(3) UL (ref 0–0.7)
EOSINOPHIL NFR BLD AUTO: 1.7 %
ERYTHROCYTE [DISTWIDTH] IN BLOOD BY AUTOMATED COUNT: 13.7 %
EST. AVERAGE GLUCOSE BLD GHB EST-MCNC: 103 MG/DL (ref 68–126)
FASTING PATIENT LIPID ANSWER: YES
FASTING STATUS PATIENT QL REPORTED: YES
GLOBULIN PLAS-MCNC: 4.3 G/DL (ref 2.8–4.4)
GLUCOSE BLD-MCNC: 87 MG/DL (ref 70–99)
GLUCOSE UR STRIP.AUTO-MCNC: NORMAL MG/DL
HBA1C MFR BLD: 5.2 % (ref ?–5.7)
HCT VFR BLD AUTO: 39.6 %
HDLC SERPL-MCNC: 49 MG/DL (ref 40–59)
HGB BLD-MCNC: 13 G/DL
IMM GRANULOCYTES # BLD AUTO: 0.05 X10(3) UL (ref 0–1)
IMM GRANULOCYTES NFR BLD: 0.5 %
LDLC SERPL CALC-MCNC: 130 MG/DL (ref ?–100)
LEUKOCYTE ESTERASE UR QL STRIP.AUTO: 25
LYMPHOCYTES # BLD AUTO: 3.3 X10(3) UL (ref 1–4)
LYMPHOCYTES NFR BLD AUTO: 32.9 %
MCH RBC QN AUTO: 30 PG (ref 26–34)
MCHC RBC AUTO-ENTMCNC: 32.8 G/DL (ref 31–37)
MCV RBC AUTO: 91.5 FL
MONOCYTES # BLD AUTO: 0.82 X10(3) UL (ref 0.1–1)
MONOCYTES NFR BLD AUTO: 8.2 %
NEUTROPHILS # BLD AUTO: 5.63 X10 (3) UL (ref 1.5–7.7)
NEUTROPHILS # BLD AUTO: 5.63 X10(3) UL (ref 1.5–7.7)
NEUTROPHILS NFR BLD AUTO: 56.1 %
NITRITE UR QL STRIP.AUTO: NEGATIVE
NONHDLC SERPL-MCNC: 144 MG/DL (ref ?–130)
OSMOLALITY SERPL CALC.SUM OF ELEC: 281 MOSM/KG (ref 275–295)
PH UR STRIP.AUTO: 5.5 [PH] (ref 5–8)
PLATELET # BLD AUTO: 478 10(3)UL (ref 150–450)
POTASSIUM SERPL-SCNC: 3.8 MMOL/L (ref 3.5–5.1)
PROT SERPL-MCNC: 7.5 G/DL (ref 6.4–8.2)
RBC # BLD AUTO: 4.33 X10(6)UL
RBC UR QL AUTO: NEGATIVE
SODIUM SERPL-SCNC: 137 MMOL/L (ref 136–145)
SP GR UR STRIP.AUTO: 1.02 (ref 1–1.03)
TRIGL SERPL-MCNC: 78 MG/DL (ref 30–149)
TSI SER-ACNC: 0.95 MIU/ML (ref 0.36–3.74)
UROBILINOGEN UR STRIP.AUTO-MCNC: NORMAL MG/DL
VIT B12 SERPL-MCNC: 281 PG/ML (ref 193–986)
VIT D+METAB SERPL-MCNC: 67.8 NG/ML (ref 30–100)
VLDLC SERPL CALC-MCNC: 14 MG/DL (ref 0–30)
WBC # BLD AUTO: 10 X10(3) UL (ref 4–11)

## 2024-03-15 PROCEDURE — 83036 HEMOGLOBIN GLYCOSYLATED A1C: CPT

## 2024-03-15 PROCEDURE — 82306 VITAMIN D 25 HYDROXY: CPT

## 2024-03-15 PROCEDURE — 85025 COMPLETE CBC W/AUTO DIFF WBC: CPT

## 2024-03-15 PROCEDURE — 84443 ASSAY THYROID STIM HORMONE: CPT | Performed by: FAMILY MEDICINE

## 2024-03-15 PROCEDURE — 80061 LIPID PANEL: CPT

## 2024-03-15 PROCEDURE — 87086 URINE CULTURE/COLONY COUNT: CPT | Performed by: FAMILY MEDICINE

## 2024-03-15 PROCEDURE — 80053 COMPREHEN METABOLIC PANEL: CPT

## 2024-03-15 PROCEDURE — 82607 VITAMIN B-12: CPT

## 2024-03-15 PROCEDURE — 81001 URINALYSIS AUTO W/SCOPE: CPT | Performed by: FAMILY MEDICINE

## 2024-03-22 ENCOUNTER — HOSPITAL ENCOUNTER (OUTPATIENT)
Dept: MAMMOGRAPHY | Age: 49
Discharge: HOME | End: 2024-03-22
Attending: OBSTETRICS & GYNECOLOGY
Payer: COMMERCIAL

## 2024-03-22 DIAGNOSIS — Z12.31 ENCOUNTER FOR SCREENING MAMMOGRAM FOR BREAST CANCER: ICD-10-CM

## 2024-03-22 PROCEDURE — 77067 SCR MAMMO BI INCL CAD: CPT | Performed by: OBSTETRICS & GYNECOLOGY

## 2024-03-22 PROCEDURE — 77063 BREAST TOMOSYNTHESIS BI: CPT | Performed by: OBSTETRICS & GYNECOLOGY

## 2024-04-05 ENCOUNTER — HOSPITAL ENCOUNTER (OUTPATIENT)
Dept: NUCLEAR MEDICINE | Facility: HOSPITAL | Age: 49
Discharge: HOME | End: 2024-04-05
Attending: INTERNAL MEDICINE
Payer: COMMERCIAL

## 2024-04-05 DIAGNOSIS — R91.8 LUNG NODULES: ICD-10-CM

## 2024-04-05 LAB — GLUCOSE BLD-MCNC: 91 MG/DL (ref 70–99)

## 2024-04-05 PROCEDURE — 82962 GLUCOSE BLOOD TEST: CPT

## 2024-04-05 PROCEDURE — 78815 PET IMAGE W/CT SKULL-THIGH: CPT | Performed by: INTERNAL MEDICINE

## 2024-04-09 ENCOUNTER — MED REC SCAN ONLY (OUTPATIENT)
Facility: CLINIC | Age: 49
End: 2024-04-09

## 2024-04-12 ENCOUNTER — OFFICE VISIT (OUTPATIENT)
Facility: CLINIC | Age: 49
End: 2024-04-12
Payer: COMMERCIAL

## 2024-04-12 VITALS
HEIGHT: 69 IN | OXYGEN SATURATION: 98 % | SYSTOLIC BLOOD PRESSURE: 110 MMHG | HEART RATE: 100 BPM | WEIGHT: 200 LBS | RESPIRATION RATE: 16 BRPM | DIASTOLIC BLOOD PRESSURE: 72 MMHG | BODY MASS INDEX: 29.62 KG/M2

## 2024-04-12 DIAGNOSIS — J45.20 MILD INTERMITTENT ASTHMA WITHOUT COMPLICATION (HCC): ICD-10-CM

## 2024-04-12 DIAGNOSIS — M54.50 CHRONIC BILATERAL LOW BACK PAIN, UNSPECIFIED WHETHER SCIATICA PRESENT: ICD-10-CM

## 2024-04-12 DIAGNOSIS — R91.8 LUNG NODULES: Primary | ICD-10-CM

## 2024-04-12 DIAGNOSIS — G89.29 CHRONIC BILATERAL LOW BACK PAIN, UNSPECIFIED WHETHER SCIATICA PRESENT: ICD-10-CM

## 2024-04-12 PROCEDURE — 3078F DIAST BP <80 MM HG: CPT | Performed by: INTERNAL MEDICINE

## 2024-04-12 PROCEDURE — 3074F SYST BP LT 130 MM HG: CPT | Performed by: INTERNAL MEDICINE

## 2024-04-12 PROCEDURE — 3008F BODY MASS INDEX DOCD: CPT | Performed by: INTERNAL MEDICINE

## 2024-04-12 PROCEDURE — 99214 OFFICE O/P EST MOD 30 MIN: CPT | Performed by: INTERNAL MEDICINE

## 2024-04-12 NOTE — PROGRESS NOTES
[facility] General Pulmonary Progress Note    History of Present Illness:  Manisha Villarreal is a 48 year old female never smoker with significant PMH of asthma, pulmonary nodules who presents today for follow up of lung nodules. Since last visit she completed the steroid regimen in January. Her back resolved around Feb 2024. She feels overall well at present. Does think she has noticed some mild dyspnea which can be at rest, feels at times sometimes needs to take deep breath.  No real exertional symptoms. No cough, no pain. No fevers.  Has not been active.     Dec 2023 previously  Since last visit she was seen at [facility] regarding the nodules. She had a repeat CT chest showing the nodules mainly had improved, although small number were either stable or slightly worse. She had PFTs which were reported normal.  She denies new concerns today - remains on prednisone regimen and tapering 2.5mg every 2 weeks, currently on 10mg daily.     October 2023 previously  She presents s/p bronchoscopy. She denies new concerns today.  Has been on pred 10 daily for her back pain but notes some days are worse than others.   No dyspnea, cough or phlegm. No f/c/s. No chest pain    10/2023 previously  She underwent bronchoscopy last week with no issues post procedure although the initial post procedure CXR did raise concern for pneumothorax, but there was no evidence on repeat chest film. She denies acute concerns today. No cough or dyspnea. Her back pain is minimal and remains on pred 10mg daily.     September 2023 previously  She has followed with Dr. Hancock for lung nodules in the past and had a previous CT guided biopsy only showing granulomas in the past.  She has had intermittent prednisone regimens for pain, which she relates back pain previously preceded her initial presently nearly a decade ago.  She has had workup with Dr. Hancock and Dr. Viera with suspicious for GPA however her labs have been disconcordant to clinical suspicion.    She reports having felt well until August 2023 when she again developed flank and back pain. She tried to manage at home but given persistence she went to ER and had CT chest showing several new lung nodules in the RML. She denies any dyspnea, wheeze, chest pain/pressure. No f/c/s. No weight changes. She does endorse minimal coughing but no phlegm over the past month.  She was given prednisone 40mg x 5 days with improvement in her pain however after completing the prednisone, her pain returned in her mid back (different than earlier).   She does have a history of asthma but has been well controlled and previously only used albuterol, but given lack of symptoms she no longer has albuterol inhaler.       Past Medical History:   Past Medical History:    Anesthesia complication    slow to wake after general anesthesia    Arthritis    Asthma (HCC)    exercise induced    Celiac disease (HCC)    Gallstones    H/O pleurisy    treated with course of steroids-followed by Dr Hancock/Pulmonologist-surgeon aware.    Vasculitis (HCC)    \"wagners vasculitis\"    Visual impairment    glasses and contacts        Past Surgical History:   Past Surgical History:   Procedure Laterality Date    Colonoscopy      2014    Dilation/curettage,diagnostic      Excision turbinate,submucous  11/27/2020    Eye surgery Right 03/2015    repair of retinal tear    Laparoscopic cholecystectomy  10/10/2013    Procedure: LAPAROSCOPIC CHOLECYSTECTOMY;  Surgeon: Elpidio Messer MD;  Location: [facility] MAIN OR    Needle biopsy left      9/22 benign.    Other surgical history  02/2015    Bladder Mesh    Other surgical history Left 09/30/2022    breast bx (benign)    Repair of nasal septum  11/27/2020    Upper gi endoscopy,exam      biopsies        Family Medical History:   Family History   Problem Relation Age of Onset    Heart Attack Mother 66        Acute MI    High Cholesterol Mother         Hypercholesterolemia    Heart Disease Mother     Obesity Mother      High Cholesterol Father         Hypercholesterolemia    Heart Disorder Father         Palpitations    Obesity Sister     Diabetes Brother     Diabetes Brother     Breast Cancer Other     Cancer Neg         Social History:   Social History     Socioeconomic History    Marital status:      Spouse name: Not on file    Number of children: Not on file    Years of education: Not on file    Highest education level: Not on file   Occupational History    Not on file   Tobacco Use    Smoking status: Never    Smokeless tobacco: Never   Vaping Use    Vaping status: Never Used   Substance and Sexual Activity    Alcohol use: No    Drug use: No    Sexual activity: Yes     Partners: Male     Birth control/protection: OCP   Other Topics Concern     Service Not Asked    Blood Transfusions Not Asked    Caffeine Concern No     Comment: 1-2 per week    Occupational Exposure Not Asked    Hobby Hazards Not Asked    Sleep Concern Not Asked    Stress Concern Not Asked    Weight Concern Not Asked    Special Diet Not Asked    Back Care Not Asked    Exercise Yes     Comment: silva 2 x a week    Bike Helmet Not Asked    Seat Belt Not Asked    Self-Exams Not Asked   Social History Narrative    Not on file     Social Determinants of Health     Financial Resource Strain: Low Risk  (11/22/2023)    Received from [facility]    Overall Financial Resource Strain (CARDIA)     Difficulty of Paying Living Expenses: Not very hard   Food Insecurity: No Food Insecurity (11/22/2023)    Received from [facility]    Hunger Vital Sign     Worried About Running Out of Food in the Last Year: Never true     Ran Out of Food in the Last Year: Never true   Transportation Needs: No Transportation Needs (11/22/2023)    Received from [facility]    PRAPARE - Transportation     Lack of Transportation (Medical): No     Lack of Transportation (Non-Medical): No   Physical Activity: Insufficiently Active (11/22/2023)     Received from [facility]    Exercise Vital Sign     Days of Exercise per Week: 2 days     Minutes of Exercise per Session: 40 min   Stress: Not on file   Social Connections: Not on file   Housing Stability: Low Risk  (11/22/2023)    Received from [facility]    Housing Stability     What is your living situation today?: I have a steady place to live        Medications:   Current Outpatient Medications   Medication Sig Dispense Refill    valACYclovir 500 MG Oral Tab Take 1 tablet (500 mg total) by mouth 2 (two) times daily. (Patient taking differently: Take 1 tablet (500 mg total) by mouth 2 (two) times daily. prn) 6 tablet 3    Levonorgest-Eth Estrad 91-Day (LOJAIMIESS) 0.1-0.02 & 0.01 MG Oral Tab TAKE 1 TABLET DAILY 91 tablet 2    semaglutide 4 MG/3ML Subcutaneous Solution Pen-injector Inject 1 mg into the skin once a week. 9 mL 0    Meloxicam 15 MG Oral Tab 1 tablet (15 mg total). Prn      methocarbamol 500 MG Oral Tab Take 1 tablet (500 mg total) by mouth as needed.      Multiple Vitamin (MULTI-DAY VITAMINS) Oral Tab Take by mouth daily.      IRON OR daily.      tiZANidine 2 MG Oral Tab Take 1 tablet (2 mg total) by mouth. 1/2 tab prn      montelukast 10 MG Oral Tab TAKE 1 TABLET BY MOUTH EVERY DAY AT NIGHT (Patient taking differently: prn) 30 tablet 0    celecoxib 100 MG Oral Cap Take 1 capsule (100 mg total) by mouth as needed. Takes once daily      estradiol (ESTRACE) 0.1 MG/GM Vaginal Cream 1/2 gram twice weekly per vagina 1 each 3    Fluocinolone Acetonide 0.01 % Otic Oil Place in ear(s) as needed.      Acidophilus/Pectin Oral Cap Take 1 capsule by mouth daily. Takes prn      Cholecalciferol (VITAMIN D3) 5000 UNITS Oral Tab Take 1 tablet (5,000 Units total) by mouth daily.      BOTOX 100 units Injection Recon Soln  (Patient not taking: Reported on 1/10/2024)         Review of Systems: Review of Systems   Constitutional: Negative.    HENT: Negative.     Respiratory:  Positive for  shortness of breath. Negative for cough, wheezing and stridor.    Cardiovascular: Negative.    Gastrointestinal: Negative.    All other systems reviewed and are negative.       Physical Exam:  /72 (BP Location: Left arm, Patient Position: Sitting, Cuff Size: adult)   Pulse 100   Resp 16   Ht 5' 9\" (1.753 m)   Wt 200 lb (90.7 kg)   LMP 09/18/2023 (Approximate)   SpO2 98%   BMI 29.53 kg/m²      Constitutional: alert, cooperative. No acute distress.  HEENT: Head NC/AT.    Cardio: Regular rate and rhythm. Normal S1 and S2. No murmurs.   Respiratory: Thorax symmetrical with no labored breathing. Clear to ausculation bilaterally with symmetrical breath sounds. No wheezing, rhonchi, rales, or crackles.   GI: NABS. Abd soft, non-tender.  Extremities: No clubbing or cyanosis. No BLE edema.    Neurologic: A&Ox3. No gross motor deficits.  Skin: Warm, dry  Psych: Calm, cooperative. Pleasant affect.    Results:  Personally reviewed    WBC: 10, done on 3/15/2024.  HGB: 13, done on 3/15/2024.  PLT: 478, done on 3/15/2024.     Glucose: 87, done on 3/15/2024.  Cr: 0.79, done on 3/15/2024.  GFR(AA): 124, done on 7/21/2022.  GFR (non-AA): 107, done on 7/21/2022.  CA: 9, done on 3/15/2024.  Na: 137, done on 3/15/2024.  K: 3.8, done on 3/15/2024.  Cl: 110, done on 3/15/2024.  CO2: 24, done on 3/15/2024.  Last ALB was 3.2% done on 3/15/2024.     PET STANDARD BODY SCAN (ONCOLOGY) (CPT=78815)    Result Date: 4/5/2024  CONCLUSION:   1.  Resolution of nodular densities within the right middle lobe.  No elevated FDG uptake in this region.   2. Several additional pulmonary nodules including right upper lobe, right lower lobe, left lower lobe, and lingula may which are stable and the lingular nodule being new since previous study.  There is no elevated FDG uptake and therefore these are likely inflammatory nodules.   LOCATION:  [facility]    Dictated by (CST): Yoandy Sinclair MD on 4/05/2024 at 1:12 PM     Finalized by (CST): Singer  MD Yoandy on 4/05/2024 at 1:22 PM       [facility] NUHA 2D+3D SCREENING BILAT (CPT=77067/56142)    Result Date: 3/22/2024  CONCLUSION:   BI-RADS CATEGORY:  DIAGNOSTIC CATEGORY 2--BENIGN FINDING NO CHANGE FROM COMPARISON ASSESSMENT.   RECOMMENDATIONS:  ROUTINE MAMMOGRAM AND CLINICAL EVALUATION IN 12 MONTHS.       A letter explaining the results in lay terms has been sent to the patient.  This exam was evaluated with a computer-aided device.  This patient's information has been entered into a reminder system with a target due date for the next mammogram.   LOCATION:  [place]   Dictated by (CST): Jose Eduardo Gold MD on 3/22/2024 at 9:18 AM     Finalized by (CST): Jose Eduardo Gold MD on 3/22/2024 at 9:22 AM             Assessment/Plan:  #1. Lung nodules  Ongoing and wax/waning for nearly a decade  Previous CT guided biopsy in 2016 with acute/chronic granulomatous inflammation  9/9/2023 CT chest with new RML nodules, question of right hilar LAD  9/25/2023 CT bronch imaging with new RUL GGO, decrease in nodules in RML. Stable RLL and LLL nodules  9/25/2023 robotic assisted bronch with bx of RML and EBUS bx of 11R: RML path with hemosiderin laden macrophages and fibroelastic scarring. No malignancy. Cx remain ngtd  11/2023 [place] CT chest report reviewed - increased number of RUL GGO, several decreased in RUL and RML. , slight increase in RLL 8mm nodule. No LAD  4/2024 PET/CT with resolution of previous nodules, does have a few tiny new nodules as well as 9x5 (average 7mm) nodule in lingula.  There is no abnormal avidity noted  We reviewed her imaging in detail including differential diagnoses and management. The Pet/CT is reassuring with negative avidity despite the new nodules  Would plan on repeat CT chest in six months per Fleischner guidelines. No plans for biopsies  She will f/u Dr. Viera today as well - previously had been treated for presumed GPA; received rituximab in 2017 and methotrexate 2018.    #2. Back pain  May  be related to above inflammatory disorder vs other such as musculoskeletal  Now resolved post slow pred wean    #3. Asthma  Reported hx of asthma  Now  with mild dyspnea at rest.  Offered starting RAVINDRA and ICS, but she wants to wait and see, will work on exercising    Matt Arreguin MD

## 2024-05-02 ENCOUNTER — OFFICE VISIT (OUTPATIENT)
Dept: HEMATOLOGY/ONCOLOGY | Facility: HOSPITAL | Age: 49
End: 2024-05-02
Attending: INTERNAL MEDICINE
Payer: COMMERCIAL

## 2024-05-02 VITALS
HEART RATE: 80 BPM | SYSTOLIC BLOOD PRESSURE: 116 MMHG | OXYGEN SATURATION: 100 % | DIASTOLIC BLOOD PRESSURE: 77 MMHG | BODY MASS INDEX: 30 KG/M2 | TEMPERATURE: 98 F | WEIGHT: 201 LBS | RESPIRATION RATE: 16 BRPM

## 2024-05-02 DIAGNOSIS — M31.30 GRANULOMATOSIS WITH POLYANGIITIS WITHOUT RENAL INVOLVEMENT (HCC): ICD-10-CM

## 2024-05-02 DIAGNOSIS — D75.839 THROMBOCYTOSIS: Primary | ICD-10-CM

## 2024-05-02 DIAGNOSIS — R76.8 ELEVATED SERUM IMMUNOGLOBULIN FREE LIGHT CHAIN LEVEL: ICD-10-CM

## 2024-05-02 DIAGNOSIS — R91.8 LUNG NODULES: ICD-10-CM

## 2024-05-02 PROCEDURE — 99215 OFFICE O/P EST HI 40 MIN: CPT | Performed by: INTERNAL MEDICINE

## 2024-05-02 RX ORDER — CHOLECALCIFEROL (VITAMIN D3) 125 MCG
500 CAPSULE ORAL DAILY
COMMUNITY

## 2024-05-02 NOTE — PROGRESS NOTES
[facility]cer Center Progress Note    Patient Name: Manisha Villarreal   YOB: 1975   Medical Record Number: [id number]   CSN: [id number]   Attending Physician: Soha Beauchamp M.D.   Referring Physician: Gemma Don MD      Date of Visit: 5/2/2024     Chief Complaint:  Chief Complaint   Patient presents with    Follow - Up     Pt here for review of recent lab results. Energy is her normal, some SOB, denies cough/fevers. Denies bleeding, some bruising. Appetite is good, on ozempic.           History of Present Illness:  47 y/o female whom I had seen in the past for for an elevated light chain level. 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     She was seen by one of my colleague Dr. Piña for an elevated IgA level. SPEP/ELIEZER was done which was negative for a monoclonal protein and therefore her elevated IgA level was felt to be secondary to inflammation, vasculitis.     She had a monoclonal study 9/2022 which showed an elevated kappa free light chain level of 2.086. Repeat study 6/2/23 showed this level had increased to 2.922. In both studies no monoclonal protein was seen on SPEP or ELIEZER.  24 urine collection was done to complete her w/u  in case she has Bence Horowitz proteins in low concentrations that routine SPEP cannot . This came back negative for free light chains. Given this result plus no monoclonal protein seen on SPEP or ELIEZER x 3,  a plasma cell dyscrasia was unlikely and no further w/u was recommended.      This time she has been referred for thrombocytosis. CBC done 9/2023: 12.5>11.3/35.4<598. Diff showed some monocytosis. Repeat CBC on 3/15/24: 10 (diff normal)>13/39.6<478.     She reports feeling well. She was started on Ozempic 10/2022 which she says she is tolerating well. No drenching night sweats or fevers. She says she has age related hot flashes and occasional leg cramps. Denies chest or abdominal pain,  change in bowel or urinary habits, headaches, dizziness or visual symptoms. Appetite is good. For the most part follows a gluten free diet. Has chronic neck pain and some ARECHIGA. No h/o clotting, erythromelalgia or pregnancy loss.         Current Medications:    Current Outpatient Medications:     cyanocobalamin 500 MCG Oral Tab, Take 1 tablet (500 mcg total) by mouth daily., Disp: , Rfl:     Levonorgest-Eth Estrad 91-Day (LOJAIMIESS) 0.1-0.02 & 0.01 MG Oral Tab, TAKE 1 TABLET DAILY, Disp: 91 tablet, Rfl: 2    semaglutide 4 MG/3ML Subcutaneous Solution Pen-injector, Inject 1 mg into the skin once a week., Disp: 9 mL, Rfl: 0    Meloxicam 15 MG Oral Tab, 1 tablet (15 mg total). Prn, Disp: , Rfl:     methocarbamol 500 MG Oral Tab, Take 1 tablet (500 mg total) by mouth as needed., Disp: , Rfl:     Multiple Vitamin (MULTI-DAY VITAMINS) Oral Tab, Take by mouth daily., Disp: , Rfl:     IRON OR, daily., Disp: , Rfl:     tiZANidine 2 MG Oral Tab, Take 1 tablet (2 mg total) by mouth. 1/2 tab prn, Disp: , Rfl:     montelukast 10 MG Oral Tab, TAKE 1 TABLET BY MOUTH EVERY DAY AT NIGHT (Patient taking differently: prn), Disp: 30 tablet, Rfl: 0    celecoxib 100 MG Oral Cap, Take 1 capsule (100 mg total) by mouth as needed. Takes once daily, Disp: , Rfl:     estradiol (ESTRACE) 0.1 MG/GM Vaginal Cream, 1/2 gram twice weekly per vagina, Disp: 1 each, Rfl: 3    Fluocinolone Acetonide 0.01 % Otic Oil, Place in ear(s) as needed., Disp: , Rfl:     Acidophilus/Pectin Oral Cap, Take 1 capsule by mouth daily. Takes prn, Disp: , Rfl:     Cholecalciferol (VITAMIN D3) 5000 UNITS Oral Tab, Take 1 tablet (5,000 Units total) by mouth daily., Disp: , Rfl:     valACYclovir 500 MG Oral Tab, Take 1 tablet (500 mg total) by mouth 2 (two) times daily. (Patient not taking: Reported on 5/2/2024), Disp: 6 tablet, Rfl: 3    Past Medical History:  Past Medical History:    Anesthesia complication    slow to wake after general anesthesia    Arthritis    Asthma  (HCC)    exercise induced    Celiac disease (HCC)    Gallstones    H/O pleurisy    treated with course of steroids-followed by Dr Hancock/Pulmonologist-surgeon aware.    Vasculitis (HCC)    \"wagners vasculitis\"    Visual impairment    glasses and contacts       Past Surgical History:  Past Surgical History:   Procedure Laterality Date    Colonoscopy          Dilation/curettage,diagnostic      Excision turbinate,submucous  2020    Eye surgery Right 2015    repair of retinal tear    Laparoscopic cholecystectomy  10/10/2013    Procedure: LAPAROSCOPIC CHOLECYSTECTOMY;  Surgeon: Elpidio Messer MD;  Location: [facility] OR    Needle biopsy left       benign.    Other surgical history  2015    Bladder Mesh    Other surgical history Left 2022    breast bx (benign)    Repair of nasal septum  2020    Upper gi endoscopy,exam      biopsies        Family Medical History:  Family History   Problem Relation Age of Onset    Heart Attack Mother 66        Acute MI    High Cholesterol Mother         Hypercholesterolemia    Heart Disease Mother     Obesity Mother     High Cholesterol Father         Hypercholesterolemia    Heart Disorder Father         Palpitations    Obesity Sister     Diabetes Brother     Diabetes Brother     Breast Cancer Other     Cancer Neg        Gyne History:  OB History    Para Term  AB Living   4 3 3 0 1 3   SAB IAB Ectopic Multiple Live Births   0 0 0 0 3   Obstetric Comments   vdx3 (two girls, one boy)       Psychosocial History:  Social History     Socioeconomic History    Marital status:      Spouse name: Not on file    Number of children: Not on file    Years of education: Not on file    Highest education level: Not on file   Occupational History    Not on file   Tobacco Use    Smoking status: Never    Smokeless tobacco: Never   Vaping Use    Vaping status: Never Used   Substance and Sexual Activity    Alcohol use: No    Drug use: No    Sexual activity:  Yes     Partners: Male     Birth control/protection: OCP   Other Topics Concern     Service Not Asked    Blood Transfusions Not Asked    Caffeine Concern No     Comment: 1-2 per week    Occupational Exposure Not Asked    Hobby Hazards Not Asked    Sleep Concern Not Asked    Stress Concern Not Asked    Weight Concern Not Asked    Special Diet Not Asked    Back Care Not Asked    Exercise Yes     Comment: silva 2 x a week    Bike Helmet Not Asked    Seat Belt Not Asked    Self-Exams Not Asked   Social History Narrative    Not on file     Social Determinants of Health     Financial Resource Strain: Low Risk  (11/22/2023)    Received from [facility]    Overall Financial Resource Strain (CARDIA)     Difficulty of Paying Living Expenses: Not very hard   Food Insecurity: No Food Insecurity (11/22/2023)    Received from [facility]    Hunger Vital Sign     Worried About Running Out of Food in the Last Year: Never true     Ran Out of Food in the Last Year: Never true   Transportation Needs: No Transportation Needs (11/22/2023)    Received from [facility]    PRAPARE - Transportation     Lack of Transportation (Medical): No     Lack of Transportation (Non-Medical): No   Physical Activity: Insufficiently Active (11/22/2023)    Received from [facility]    Exercise Vital Sign     Days of Exercise per Week: 2 days     Minutes of Exercise per Session: 40 min   Stress: Not on file   Social Connections: Not on file   Housing Stability: Low Risk  (11/22/2023)    Received from [facility]    Housing Stability     What is your living situation today?: I have a steady place to live         Allergies:  Allergies   Allergen Reactions    Azathioprine RASH     Nausea, diffuse, lip swelling, low BP    Bactrim [Sulfamethoxazole W/Trimethoprim] HIVES    Cats Claw, Uncaria Tomentosa WHEEZING    Gluten Flour      Gets very tired from gluten in foods and then will have  stomach issues        Review of Systems:  A 14-point ROS was done with pertinent positives and negative per the HPI    Vital Signs:  /77 (BP Location: Left arm, Patient Position: Sitting, Cuff Size: adult)   Pulse 80   Temp 97.9 °F (36.6 °C) (Tympanic)   Resp 16   Wt 91.2 kg (201 lb)   LMP 09/18/2023 (Approximate)   SpO2 100%   BMI 29.68 kg/m²       Physical Examination:  General: Patient is alert and oriented x 3, not in acute distress.  Psych:  Mood and affect appropriate  HEENT: EOMs intact. PERRL. Oropharynx is clear.   Neck: No JVD. No palpable lymphadenopathy. Neck is supple.  Lymphatics: There is no palpable lymphadenopathy   Chest: Clear to auscultation.  Heart: Regular rate and rhythm.   Abdomen: Soft, non tender with good bowel sounds.  No hepatosplenomegaly.  No palpable mass.  Extremities: Pedal pulses are present. No edema.  Neurological: Grossly intact.       Laboratory:  Lab Results   Component Value Date    WBC 10.0 03/15/2024    RBC 4.33 03/15/2024    HGB 13.0 03/15/2024    HCT 39.6 03/15/2024    MCV 91.5 03/15/2024    MCH 30.0 03/15/2024    MCHC 32.8 03/15/2024    RDW 13.7 03/15/2024    .0 (H) 03/15/2024    MPV 10.3 11/21/2012     Lab Results   Component Value Date     03/15/2024    K 3.8 03/15/2024     03/15/2024    CO2 24.0 03/15/2024    BUN 7 (L) 03/15/2024    CREATSERUM 0.79 03/15/2024    GLU 87 03/15/2024    CA 9.0 03/15/2024    ALKPHO 110 (H) 03/15/2024    ALT 25 03/15/2024    AST 12 (L) 03/15/2024    BILT 0.4 03/15/2024    ALB 3.2 (L) 03/15/2024    TP 7.5 03/15/2024      Latest Reference Range & Units 06/02/23 08:40   KAPPA FREE LIGHT CHAIN 0.330 - 1.940 mg/dL 2.922 (H)   LAMBDA FREE LIGHT CHAIN 0.571 - 2.630 mg/dL 2.680 (H)   KAPPA/LAMBDA FLC RATIO 0.26 - 1.65  1.09   PROTEIN, TOTAL 6.4 - 8.2 g/dL 7.0   Albumin 3.75 - 5.21 g/dL 3.18 (L)   ALPHA-1-GLOBULINS 0.19 - 0.46 g/dL 0.48 (H)   ALPHA-2-GLOBULINS 0.48 - 1.05 g/dL 1.04   BETA GLOBULINS 0.68 - 1.23 g/dL  1.22   GAMMA GLOBULINS 0.62 - 1.70 g/dL 1.09   ALBUMIN/GLOBULIN RATIO 1.00 - 2.00  0.83 (L)   SPE INTERPRETATION  No apparent monoclonal protein on serum electrophoresis.  Reviewed by Tarun Murray M.D. Pathology 06/07/23 at 10:16 AM     IMMUNOFIXATION  No monoclonal protein detected by immunofixation.  Reviewed by Tarun Murray M.D. Pathology 06/07/23 at 10:16 AM          Latest Reference Range & Units 12/06/19 15:01 12/22/20 08:15   IMMUNOGLOBULIN A 70.00 - 312.00 mg/dL 521.00 (H) 436.00 (H)       Radiology:  PROCEDURE:  PET/CT STANDARD BODY SCAN (ONCOLOGY) (CPT=78815)     COMPARISON:  [facility] , XR, XR ENDOSCOPY - N/C, 9/25/2023, 3:07 PM.  [facility] , CT, CTA CHEST + CT ABD (W) + CT PEL (W) SH(CPT=71275/35895), 9/09/2023, 11:49 AM.  [facility] , CT, CT CHEST BRONCH NAVIGATION PRE-OP <6 WEEKS (CPT=76497), 9/25/2023, 12:06 PM.    [facility] , NM, PET/CT STANDARD BODY SCAN (ONCOLOGY) (CPT=78815), 3/06/2015, 10:46 AM.     INDICATIONS:  R91.8 Lung nodules     TECHNIQUE:  The patient fasted for at least 6 hours. F-18 FDG was injected IV, and whole-body images from vertex to mid-thigh were obtained with concurrent CT scan for both anatomic localization as well as attenuation correction.     RADIOPHARMACEUTICAL:    9.6 mCi F-18 FDG  FASTING GLUCOSE LEVEL:  91 mg/dl  INJECTION TIME:         0810 hours  SCAN TIME:              0911 hours     FINDINGS:       The nodular density within the lateral segment of the right middle lobe has resolved with only a linear band of scarring noted in its place.  Right upper lobe subpleural nodule in the region of previously noted ground-glass opacity also demonstrates no  elevated FDG uptake with maximum SUV of 1.5.  The subpleural nodule in the right lower lobe posteriorly measuring 6 mm is stable and there is no elevated FDG uptake.  Subpleural nodule measuring 4 mm in the left lower lobe demonstrates no evidence of  abnormal FDG uptake.  A subpleural nodule within the lingula is new since previous  study measuring 8 mm without elevated FDG uptake.     There are small bilateral inguinal lymph nodes with maximum FDG uptake of 2 on the right and 1.8 on the left which are likely post inflammatory.                   Impression   CONCLUSION:       1.  Resolution of nodular densities within the right middle lobe.  No elevated FDG uptake in this region.       2. Several additional pulmonary nodules including right upper lobe, right lower lobe, left lower lobe, and lingula may which are stable and the lingular nodule being new since previous study.  There is no elevated FDG uptake and therefore these are  likely inflammatory nodules.        LOCATION:  [facility]           Dictated by (CST): Yoandy Sinclair MD on 4/05/2024 at 1:12 PM      Finalized by (CST): Yoandy Sinclair MD on 4/05/2024 at 1:22 PM       PROCEDURE:  CT CHEST BRONCH NAVIGATION PRE-OP <6 WEEKS (CPT=76497)     COMPARISON:  [facility] , CT, CTA CHEST + CT ABD (W) + CT PEL (W) SH(CPT=71275/50151), 9/09/2023, 11:49 AM.     INDICATIONS:  R91.8 Lung nodules     TECHNIQUE:  Unenhanced multislice CT scanning is performed through the chest.  Additional imaging was acquired for navigational bronchoscopy.  Dose reduction techniques were used. Dose information is transmitted to the ACR (American College of Radiology)   NRDR (National Radiology Data Registry) which includes the Dose Index Registry.     PATIENT STATED HISTORY: (As transcribed by Technologist)  Pre-op scan.         FINDINGS:  Sensitivity decreased without IV contrast.  LUNGS:  There are new patchy ground-glass opacities in right upper lobe.  These measure up to 1.8 cm.    Reticular nodule opacities in the right middle lobe are decreased.  There is significant residual however.  The dominant individual nodule is measured at 0.9 x 0.7 cm, prior 1.4 x 1.3 cm.    0.6 cm pleural-based nodule right lower lobe is unchanged.  0.4 cm pleural based left lower lobe nodule is unchanged.      No new  bronchiectasis.  VASCULATURE:  Smooth tapering.  RAY:  No residual measurable right hilar adenopathy.  MEDIASTINUM:  A few scattered small lymph nodes are either stable or minimally decreased in size.  CARDIAC:  No pericardial effusion.  No significant coronary calcifications.  PLEURA:  No pleural effusion.  THORACIC AORTA:  Normal caliber.  CHEST WALL:  No axillary adenopathy.  LIMITED ABDOMEN:  Surgically absent gallbladder.  No biliary dilatation.  BONES:  Mild to moderate degenerative changes.                      Impression   CONCLUSION:    1. Interval resolution of right hilar adenopathy.  2. New patchy ground-glass opacities in the right upper lobe.  These are most likely inflammatory/infectious in nature.  Short-term follow-up is recommended for continued assessment.  3. Improving reticular nodular opacities in the right middle lobe.  4. Details as above.  Continued clinical correlation recommended.               LOCATION:  [facility]        Dictated by (CST): Jadon Quinones MD on 9/25/2023 at 4:09 PM      Finalized by (CST): Jadon Quinones MD on 9/25/2023 at 4:17 PM         PROCEDURE:  CTA CHEST + CT ABD (W) + CT PEL (W) SH(CPT=71275/48047)     COMPARISON:  [facility] , CT, CT CHEST LD NO CAD(CPT=71250), 8/13/2021, 8:25 AM.     INDICATIONS:  Pt from [facility] for further evaluation of abd pain     TECHNIQUE:  CT of the chest (with CTA imaging), abdomen, and pelvis were obtained post- injection of non-ionic intravenous contrast material. Multi-planar reformatted/3-D images were created to optimize visualization of vascular anatomy.  Dose reduction  techniques were used. Dose information is transmitted to the ACR (American College of Radiology) NRDR (National Radiology Data Registry) which includes the Dose Index Registry.     PATIENT STATED HISTORY:(As transcribed by Technologist)  Patient has right and left lower back pain.      CONTRAST USED:  100cc of Isovue 370     FINDINGS:       CHEST:    LUNGS:  Nodular consolidation  in the right middle lobe adjacent to the horizontal fissure measures 2.4 x 1.1 cm.  (image 105).  Right middle lobe mass measures 1.4 x 1.3 cm (image 115).  Right lower lobe 7 mm nodule (image 119).  Nodule adjacent to the  horizontal fissure measures 10 x 7 mm (image 110).    MEDIASTINUM:  AP window 1.3 x 0.9 cm lymph node (image 75).  RAY:  Right perihilar mass measures 3.1 x 2.6 cm.  This is encasing the right upper lobe lobar pulmonary arteries.  CARDIAC:  No enlargement, pericardial thickening, or significant coronary artery calcification.  PLEURA:  No mass or effusion.    CHEST WALL:  No mass or axillary adenopathy.    AORTA:  No aneurysm or dissection.    VASCULATURE:  No visible pulmonary arterial thrombus or attenuation.       ABDOMEN/PELVIS:  LIVER:  No enlargement, atrophy, abnormal density, or significant focal lesion.    BILIARY:  Sequelae of cholecystectomy.  PANCREAS:  No lesion, fluid collection, ductal dilatation, or atrophy.    SPLEEN:  No enlargement or focal lesion.    KIDNEYS:  No mass, obstruction, or calcification.    ADRENALS:  No mass or enlargement.    AORTA:  No aneurysm or dissection.    RETROPERITONEUM:  No mass or adenopathy.    BOWEL/MESENTERY:  No visible mass, obstruction, or bowel wall thickening.  The appendix is unremarkable.  ABDOMINAL WALL:  No mass or hernia.    URINARY BLADDER:  No visible focal wall thickening, lesion, or calculus.    PELVIC NODES:  No adenopathy.    PELVIC ORGANS:  No visible mass.  Pelvic organs appropriate for patient age.    BONES:  No bony lesion or fracture.                   Impression   CONCLUSION:       1. There is right perihilar masslike lymphadenopathy.  There is associated obstruction of the right upper lobe pulmonary arteries.  A distinct pulmonary embolus is not identified.     2. Multiple masses in the right middle lobe and nodule the right lower lobe are noted.  These are significantly increased since prior CT chest examination at are  suspicious for metastatic disease.  Associated increased mediastinal lymphadenopathy is also   noted.     3. No evidence of an acute inflammatory process in the abdomen and pelvis.          LOCATION:  [facility]        Dictated by (CST): Jose Eduardo Gold MD on 9/09/2023 at 12:07 PM      Finalized by (CST): Jose Eduardo Gold MD on 9/09/2023 at 12:40 PM       Impression and Plan:  1. Thrombocytosis  - reviewed labs with her in detail  - looking at labs available in [facility] she appears to have had intermittent thrombocytosis going back to 2012. However during the time we had seen her platelets from 2017 to 2022 were within normal limits- though a lot of time tended to be higher end of normal   - no evidence of hepatosplenomegaly on previous imaging  - I suspect thrombocytosis is reactive (secondary) given her h/o inflammatory disorders    2. Elevated kappa free light chains  - no monoclonal protein seen on SPEP or ELIEZER x 3 and therefore not likely to have a plasma cell dyscrasia.   - elevation likely secondary to underlying autoimmune disease  - 24 hour urine collection negative for free light chains    3. Elevated IgA level  - typically seen in inflammatory disorders including vasculitis and several autoimmune disorders  - again as not clonal, not worrisome for paraproteinemia    4. H/o necrotizing granulomas, nodules in the lungs  - followed by rheum and pulmonary  - ? Wegener's though ANCA negative   - under rheum had received steroids and Rituxan in the past with improvement  - per pulmonary nodules have waxed and waned  - previous biopsies negative for malignancy  - followed closely with CTs. Had PET 4/2024 which showed resolution of nodules in the RUL and remaining nodules with no abnormal avidity worrisome for malignancy     5. Celiac disease  - follows gluten free diet. Advised compliance      Will communicate through MyChart and arrange for f/u as indicated. She was comfortable with the plan    I spent 40 minutes  face to face with the patient.  More than 50% of that time was spent counseling the patient and/or on coordination of care.        Soha Beauchamp MD  [facility]

## 2024-05-03 ENCOUNTER — NURSE ONLY (OUTPATIENT)
Dept: HEMATOLOGY/ONCOLOGY | Facility: HOSPITAL | Age: 49
End: 2024-05-03
Attending: INTERNAL MEDICINE
Payer: COMMERCIAL

## 2024-05-03 LAB
BASOPHILS # BLD AUTO: 0.06 X10(3) UL (ref 0–0.2)
BASOPHILS NFR BLD AUTO: 0.6 %
DEPRECATED HBV CORE AB SER IA-ACNC: 48.6 NG/ML
EOSINOPHIL # BLD AUTO: 0.22 X10(3) UL (ref 0–0.7)
EOSINOPHIL NFR BLD AUTO: 2.1 %
ERYTHROCYTE [DISTWIDTH] IN BLOOD BY AUTOMATED COUNT: 13.4 %
ERYTHROCYTE [SEDIMENTATION RATE] IN BLOOD: 34 MM/HR
HCT VFR BLD AUTO: 40.1 %
HGB BLD-MCNC: 13.2 G/DL
IMM GRANULOCYTES # BLD AUTO: 0.03 X10(3) UL (ref 0–1)
IMM GRANULOCYTES NFR BLD: 0.3 %
IRON SATN MFR SERPL: 24 %
IRON SERPL-MCNC: 81 UG/DL
LYMPHOCYTES # BLD AUTO: 3.69 X10(3) UL (ref 1–4)
LYMPHOCYTES NFR BLD AUTO: 34.7 %
MCH RBC QN AUTO: 29.3 PG (ref 26–34)
MCHC RBC AUTO-ENTMCNC: 32.9 G/DL (ref 31–37)
MCV RBC AUTO: 89.1 FL
MONOCYTES # BLD AUTO: 1.02 X10(3) UL (ref 0.1–1)
MONOCYTES NFR BLD AUTO: 9.6 %
NEUTROPHILS # BLD AUTO: 5.62 X10 (3) UL (ref 1.5–7.7)
NEUTROPHILS # BLD AUTO: 5.62 X10(3) UL (ref 1.5–7.7)
NEUTROPHILS NFR BLD AUTO: 52.7 %
PLATELET # BLD AUTO: 359 10(3)UL (ref 150–450)
RBC # BLD AUTO: 4.5 X10(6)UL
TIBC SERPL-MCNC: 332 UG/DL (ref 240–450)
TRANSFERRIN SERPL-MCNC: 223 MG/DL (ref 200–360)
WBC # BLD AUTO: 10.6 X10(3) UL (ref 4–11)

## 2024-05-03 PROCEDURE — 36415 COLL VENOUS BLD VENIPUNCTURE: CPT

## 2024-05-09 LAB — INTERP BCRABL: NEGATIVE

## 2024-05-16 ENCOUNTER — LAB ENCOUNTER (OUTPATIENT)
Dept: LAB | Age: 49
End: 2024-05-16
Attending: PHYSICIAN ASSISTANT

## 2024-05-16 ENCOUNTER — TELEPHONE (OUTPATIENT)
Dept: UROLOGY | Facility: CLINIC | Age: 49
End: 2024-05-16

## 2024-05-16 DIAGNOSIS — J84.9 TROPICAL PULMONARY ALVEOLITIS (HCC): Primary | ICD-10-CM

## 2024-05-16 DIAGNOSIS — R30.0 DYSURIA: ICD-10-CM

## 2024-05-16 DIAGNOSIS — R07.81 PLEURITIC CHEST PAIN: ICD-10-CM

## 2024-05-16 DIAGNOSIS — R30.0 DYSURIA: Primary | ICD-10-CM

## 2024-05-16 LAB
BILIRUB UR QL STRIP.AUTO: NEGATIVE
C3 SERPL-MCNC: 135 MG/DL (ref 90–180)
C4 SERPL-MCNC: 34.3 MG/DL (ref 10–40)
COLOR UR AUTO: YELLOW
CREAT UR-SCNC: 218 MG/DL
CRP SERPL-MCNC: 1.77 MG/DL (ref ?–0.3)
ERYTHROCYTE [SEDIMENTATION RATE] IN BLOOD: 46 MM/HR
GLUCOSE UR STRIP.AUTO-MCNC: NORMAL MG/DL
KETONES UR STRIP.AUTO-MCNC: NEGATIVE MG/DL
LEUKOCYTE ESTERASE UR QL STRIP.AUTO: 500
NITRITE UR QL STRIP.AUTO: NEGATIVE
PH UR STRIP.AUTO: 6 [PH] (ref 5–8)
PROT UR STRIP.AUTO-MCNC: 100 MG/DL
PROT UR-MCNC: 129.7 MG/DL
RBC #/AREA URNS AUTO: >10 /HPF
SP GR UR STRIP.AUTO: 1.02 (ref 1–1.03)
UROBILINOGEN UR STRIP.AUTO-MCNC: NORMAL MG/DL
WBC #/AREA URNS AUTO: >50 /HPF
WBC CLUMPS UR QL AUTO: PRESENT /HPF

## 2024-05-16 PROCEDURE — 87077 CULTURE AEROBIC IDENTIFY: CPT

## 2024-05-16 PROCEDURE — 87086 URINE CULTURE/COLONY COUNT: CPT

## 2024-05-16 PROCEDURE — 87186 SC STD MICRODIL/AGAR DIL: CPT

## 2024-05-16 PROCEDURE — 86140 C-REACTIVE PROTEIN: CPT

## 2024-05-16 PROCEDURE — 85652 RBC SED RATE AUTOMATED: CPT

## 2024-05-16 PROCEDURE — 86160 COMPLEMENT ANTIGEN: CPT

## 2024-05-16 PROCEDURE — 86037 ANCA TITER EACH ANTIBODY: CPT

## 2024-05-16 PROCEDURE — 81001 URINALYSIS AUTO W/SCOPE: CPT

## 2024-05-16 PROCEDURE — 82570 ASSAY OF URINE CREATININE: CPT

## 2024-05-16 PROCEDURE — 83516 IMMUNOASSAY NONANTIBODY: CPT

## 2024-05-16 PROCEDURE — 82164 ANGIOTENSIN I ENZYME TEST: CPT

## 2024-05-16 PROCEDURE — 84156 ASSAY OF PROTEIN URINE: CPT

## 2024-05-16 RX ORDER — CEPHALEXIN 500 MG/1
500 CAPSULE ORAL 3 TIMES DAILY
Qty: 21 CAPSULE | Refills: 0 | Status: SHIPPED | OUTPATIENT
Start: 2024-05-16

## 2024-05-16 NOTE — TELEPHONE ENCOUNTER
.Telephone call received from patient.     Patient complains of UTI signs and symptoms including +dysuria x 4 days  Does not want Macrobid (hx of lung nodules)    Denies fever    Allergies and previous cultures reviewed    Hx incomplete emptying:  N    Taking D-Mannose    Using Estrace: Y     Recent ucxs:   3/15/24 no growth  12/12/23 >100K EColi, pansensitive, Keflex TID x 7days  9/9/23 50-99K Lactobacillus  8/19/23 10-50K multiple species    Urine culture ordered, will test @ [facility].  Leroy Weston PA with condition update, await orders

## 2024-05-20 LAB
ACE: 29 U/L
ANTI-MPO ANTIBODIES: <0.2 UNITS
ANTI-PR3 ANTIBODIES: <0.2 UNITS

## 2024-05-28 ENCOUNTER — TELEPHONE (OUTPATIENT)
Dept: UROLOGY | Facility: CLINIC | Age: 49
End: 2024-05-28

## 2024-05-28 RX ORDER — CEPHALEXIN 500 MG/1
500 CAPSULE ORAL 3 TIMES DAILY
Qty: 21 CAPSULE | Refills: 0 | Status: SHIPPED | OUTPATIENT
Start: 2024-05-28 | End: 2024-06-04

## 2024-05-28 RX ORDER — ESTRADIOL 0.1 MG/G
CREAM VAGINAL
Qty: 42.5 G | Refills: 3 | Status: SHIPPED | OUTPATIENT
Start: 2024-05-28

## 2024-05-28 NOTE — TELEPHONE ENCOUNTER
Discussed with Dr Margie GARCIA continue Keflex 500mg po tid x 7 days if symptoms not improved after this course will need another urine culture  \  RX to [facility], [place] [address] per pt request

## 2024-05-28 NOTE — TELEPHONE ENCOUNTER
Telephone call received from patient.   Pt in [place] and asking for continuation of rx    Patient complains of UTI signs and symptoms including urgency, +frequency, +dysuria x 2 days    + urine culture 5/16/24 >100k e coli tx with keflex 500po tid x 7, finished 5/24.  Symptoms returned 5/26    Denies fever    Allergies and previous cultures reviewed      Recent ucxs:   3/15/24 no growth  12/12/23 >100K EColi, pansensitive, Keflex TID x 7days  Hx incomplete emptying:  N    Taking  D mannose powder  Using Estrace: Y  but forgot to bring to [place]      Encouraged PO hydration    All questions answered      Will discuss with MD Hanson

## 2024-05-30 NOTE — TELEPHONE ENCOUNTER
TC from pt reporting sx are not any better yet after 2 additional days of keflex. Continues to take the course, but sx are not resolving. Pt is still in [place].  LM for pt on VM that she needs a ucx. Advised to go to [facility] or [facility].

## 2024-06-30 DIAGNOSIS — E66.3 OVERWEIGHT (BMI 25.0-29.9): ICD-10-CM

## 2024-07-05 NOTE — TELEPHONE ENCOUNTER
Last office visit: 2/23/24  Next office visit: 7/15/24  Last Refill:1/10/24    Requested Prescriptions     Pending Prescriptions Disp Refills    OZEMPIC, 2 MG/DOSE, 8 MG/3ML Subcutaneous Solution Pen-injector [Pharmacy Med Name: OZEMPIC PEN (2MG/DOSE) 3ML 8MG/3ML] 9 mL 3     Sig: INJECT 2 MG UNDER THE SKIN WEEKLY       Please authorize if acceptable.   Thank you!

## 2024-07-07 ENCOUNTER — HOSPITAL ENCOUNTER (OUTPATIENT)
Age: 49
Discharge: HOME | End: 2024-07-07
Attending: EMERGENCY MEDICINE
Payer: COMMERCIAL

## 2024-07-07 VITALS
RESPIRATION RATE: 20 BRPM | HEIGHT: 69 IN | WEIGHT: 195 LBS | BODY MASS INDEX: 28.88 KG/M2 | SYSTOLIC BLOOD PRESSURE: 141 MMHG | OXYGEN SATURATION: 98 % | DIASTOLIC BLOOD PRESSURE: 83 MMHG | TEMPERATURE: 99 F | HEART RATE: 89 BPM

## 2024-07-07 DIAGNOSIS — N30.00 ACUTE CYSTITIS WITHOUT HEMATURIA: Primary | ICD-10-CM

## 2024-07-07 LAB
B-HCG UR QL: NEGATIVE
BILIRUB UR QL STRIP: NEGATIVE
COLOR UR: YELLOW
GLUCOSE UR STRIP-MCNC: NEGATIVE MG/DL
KETONES UR STRIP-MCNC: NEGATIVE MG/DL
NITRITE UR QL STRIP: NEGATIVE
PH UR STRIP: 5.5 [PH]
PROT UR STRIP-MCNC: NEGATIVE MG/DL
SP GR UR STRIP: 1.01
UROBILINOGEN UR STRIP-ACNC: <2 MG/DL

## 2024-07-07 PROCEDURE — 99214 OFFICE O/P EST MOD 30 MIN: CPT

## 2024-07-07 PROCEDURE — 87086 URINE CULTURE/COLONY COUNT: CPT | Performed by: EMERGENCY MEDICINE

## 2024-07-07 PROCEDURE — 81025 URINE PREGNANCY TEST: CPT

## 2024-07-07 PROCEDURE — 81002 URINALYSIS NONAUTO W/O SCOPE: CPT

## 2024-07-07 PROCEDURE — 99213 OFFICE O/P EST LOW 20 MIN: CPT

## 2024-07-07 RX ORDER — PHENAZOPYRIDINE HYDROCHLORIDE 200 MG/1
200 TABLET, FILM COATED ORAL 3 TIMES DAILY PRN
Qty: 6 TABLET | Refills: 0 | Status: SHIPPED | OUTPATIENT
Start: 2024-07-07 | End: 2024-07-09

## 2024-07-07 RX ORDER — CIPROFLOXACIN 250 MG/1
250 TABLET, FILM COATED ORAL 2 TIMES DAILY
Qty: 6 TABLET | Refills: 0 | Status: SHIPPED | OUTPATIENT
Start: 2024-07-07 | End: 2024-07-14

## 2024-07-07 NOTE — ED PROVIDER NOTES
Patient Seen in: [facility]      History     Chief Complaint   Patient presents with    Urinary Symptoms     Stated Complaint: urinary problems    Subjective:   HPI    Two days of urinary urgency and frequency with suprapubic pressure. No fever. No vomiting. No flank pain. Has had monthly UTI's over the last 3-4 months. Most recently was on cipro and symptoms resolved.     Objective:   Past Medical History:    Anesthesia complication    slow to wake after general anesthesia    Arthritis    Asthma (HCC)    exercise induced    Celiac disease (HCC)    Gallstones    H/O pleurisy    treated with course of steroids-followed by Dr Hancock/Pulmonologist-surgeon aware.    Vasculitis (HCC)    \"wagners vasculitis\"    Visual impairment    glasses and contacts              Past Surgical History:   Procedure Laterality Date    Colonoscopy      2014    Dilation/curettage,diagnostic      Excision turbinate,submucous  11/27/2020    Eye surgery Right 03/2015    repair of retinal tear    Laparoscopic cholecystectomy  10/10/2013    Procedure: LAPAROSCOPIC CHOLECYSTECTOMY;  Surgeon: Elpidio Messer MD;  Location: [facility] OR    Needle biopsy left      9/22 benign.    Other surgical history  02/2015    Bladder Mesh    Other surgical history Left 09/30/2022    breast bx (benign)    Repair of nasal septum  11/27/2020    Upper gi endoscopy,exam      biopsies                 Social History     Socioeconomic History    Marital status:    Tobacco Use    Smoking status: Never    Smokeless tobacco: Never   Vaping Use    Vaping status: Never Used   Substance and Sexual Activity    Alcohol use: No    Drug use: No    Sexual activity: Yes     Partners: Male     Birth control/protection: OCP   Other Topics Concern    Caffeine Concern No     Comment: 1-2 per week    Exercise Yes     Comment: silva 2 x a week     Social Determinants of Health     Financial Resource Strain: Low Risk  (11/22/2023)    Received from [facility],  [facility]    Overall Financial Resource Strain (CARDIA)     Difficulty of Paying Living Expenses: Not very hard   Food Insecurity: No Food Insecurity (11/22/2023)    Received from [facility]    Hunger Vital Sign     Worried About Running Out of Food in the Last Year: Never true     Ran Out of Food in the Last Year: Never true   Transportation Needs: No Transportation Needs (11/22/2023)    Received from [facility]    PRAPARE - Transportation     Lack of Transportation (Medical): No     Lack of Transportation (Non-Medical): No   Physical Activity: Insufficiently Active (11/22/2023)    Received from [facility]    Exercise Vital Sign     Days of Exercise per Week: 2 days     Minutes of Exercise per Session: 40 min   Housing Stability: Low Risk  (11/22/2023)    Received from [facility]    Housing Stability     What is your living situation today?: I have a steady place to live              Review of Systems    Positive for stated Chief Complaint: Urinary Symptoms    Other systems are as noted in HPI.  Constitutional and vital signs reviewed.      All other systems reviewed and negative except as noted above.    Physical Exam     ED Triage Vitals [07/07/24 1057]   /83   Pulse 89   Resp 20   Temp 99.1 °F (37.3 °C)   Temp src Temporal   SpO2 98 %   O2 Device None (Room air)       Current Vitals:   Vital Signs  BP: 141/83  Pulse: 89  Resp: 20  Temp: 99.1 °F (37.3 °C)  Temp src: Temporal    Oxygen Therapy  SpO2: 98 %  O2 Device: None (Room air)            Physical Exam  Vitals and nursing note reviewed.   Constitutional:       Appearance: Normal appearance. She is well-developed.   HENT:      Head: Normocephalic and atraumatic.   Cardiovascular:      Rate and Rhythm: Normal rate and regular rhythm.   Pulmonary:      Effort: Pulmonary effort is normal. No respiratory distress.   Abdominal:      General: There is no distension.      Palpations: Abdomen is soft.       Tenderness: There is no abdominal tenderness. There is no right CVA tenderness or left CVA tenderness.   Skin:     General: Skin is warm and dry.      Capillary Refill: Capillary refill takes less than 2 seconds.   Neurological:      General: No focal deficit present.      Mental Status: She is alert.      Sensory: No sensory deficit.   Psychiatric:         Mood and Affect: Mood normal.         Behavior: Behavior normal.              ED Course     Labs Reviewed   [facility] POCT URINALYSIS DIPSTICK - Abnormal; Notable for the following components:       Result Value    Urine Clarity Cloudy (*)     Blood, Urine Large (*)     Leukocyte esterase urine Large (*)     All other components within normal limits   POCT PREGNANCY URINE - Normal   URINE CULTURE, ROUTINE                      MDM                                      Medical Decision Making  Bladder infection, interstitial cystitis, overactive bladder syndrome all in differential. Urinalysis consistent with infection. Culture pending at time of discharge. Discharge on cipro and pyridium as these have treated the infection most effectively    Disposition and Plan     Clinical Impression:  1. Acute cystitis without hematuria         Disposition:  Discharge  7/7/2024 11:30 am    Follow-up:  Gemma Don MD  [address] [place] [id number]  [phone]      As needed          Medications Prescribed:  Discharge Medication List as of 7/7/2024 11:31 AM        START taking these medications    Details   ciprofloxacin 250 MG Oral Tab Take 1 tablet (250 mg total) by mouth 2 (two) times daily for 7 days., Normal, Disp-6 tablet, R-0      phenazopyridine 200 MG Oral Tab Take 1 tablet (200 mg total) by mouth 3 (three) times daily as needed for Pain., Normal, Disp-6 tablet, R-0

## 2024-07-07 NOTE — ED INITIAL ASSESSMENT (HPI)
Patient presents to IC with c/o urinary frequency/urgency, dysuria since yesterday.  Denies fever/flank pain/hematuria.  Hx UTIs.

## 2024-07-08 RX ORDER — SEMAGLUTIDE 2.68 MG/ML
2 INJECTION, SOLUTION SUBCUTANEOUS WEEKLY
Qty: 9 ML | Refills: 0 | Status: SHIPPED | OUTPATIENT
Start: 2024-07-08 | End: 2024-09-21

## 2024-07-25 DIAGNOSIS — E66.3 OVERWEIGHT (BMI 25.0-29.9): ICD-10-CM

## 2024-07-25 NOTE — TELEPHONE ENCOUNTER
Patient is calling today for a refill request   semaglutide (OZEMPIC, 2 MG/DOSE,) 8 MG/3ML Subcutaneous Solution Pen-injector   To be sent to   [facility] - [place], [place] - [address] [place] [address] [phone], [phone] [address] [facility] [address]   Phone: [phone] Fax: [phone]     Patient is requesting a 90 supply prescription    Please advise    Thank you

## 2024-07-31 NOTE — TELEPHONE ENCOUNTER
Called patient was unable to leave voice mail due to no identifier , my chart message sent as well

## 2024-08-01 RX ORDER — SEMAGLUTIDE 2.68 MG/ML
2 INJECTION, SOLUTION SUBCUTANEOUS WEEKLY
Qty: 9 ML | Refills: 0 | OUTPATIENT
Start: 2024-08-01

## 2024-08-09 ENCOUNTER — TELEPHONE (OUTPATIENT)
Dept: UROLOGY | Facility: CLINIC | Age: 49
End: 2024-08-09

## 2024-08-09 DIAGNOSIS — N39.41 URGE INCONTINENCE: ICD-10-CM

## 2024-08-09 RX ORDER — MIRABEGRON 50 MG/1
50 TABLET, EXTENDED RELEASE ORAL DAILY
Qty: 90 TABLET | Refills: 0 | Status: SHIPPED | OUTPATIENT
Start: 2024-08-09 | End: 2024-11-07

## 2024-08-09 NOTE — TELEPHONE ENCOUNTER
Patient calls with request for refill of Myrbetriq 50mg every day to [facility]. Aware she needs an appt and wants to discuss UTIs with Dr. Hanson.  Not consistent with Estrace, doesn't like using the cream.  Taking Uqora OTC supplements  Denies UTI sx today  Urine cultures as follows:  7/7/24 <10K mixed gram +  5/16/24 >100K E Coli, Rx Keflex, given additional 7 days of Keflex while in [place] for persistent sx  3/15/24 no growth cx  12/12/23 >100K EColi, Rx Keflex  9/9/23 50-99 Lactobacilus  8/19/23 10-50 multiple species    Myrbetriq ordered per last visit note.  Sent to appt desk to schedule follow up

## 2024-09-03 ENCOUNTER — VIRTUAL PHONE E/M (OUTPATIENT)
Dept: UROLOGY | Facility: CLINIC | Age: 49
End: 2024-09-03
Attending: OBSTETRICS & GYNECOLOGY
Payer: COMMERCIAL

## 2024-09-03 DIAGNOSIS — N81.84 PELVIC MUSCLE WASTING: ICD-10-CM

## 2024-09-03 DIAGNOSIS — B00.9 HSV INFECTION: ICD-10-CM

## 2024-09-03 DIAGNOSIS — N39.0 FREQUENT UTI: ICD-10-CM

## 2024-09-03 DIAGNOSIS — N39.41 URGE INCONTINENCE: Primary | ICD-10-CM

## 2024-09-03 DIAGNOSIS — N95.2 POSTMENOPAUSAL ATROPHIC VAGINITIS: ICD-10-CM

## 2024-09-03 DIAGNOSIS — Z98.890 POST-OPERATIVE STATE: ICD-10-CM

## 2024-09-03 RX ORDER — ESTRADIOL 10 UG/1
10 INSERT VAGINAL
Qty: 24 TABLET | Refills: 3 | Status: SHIPPED | OUTPATIENT
Start: 2024-09-04

## 2024-09-03 RX ORDER — CEPHALEXIN 500 MG/1
500 CAPSULE ORAL 3 TIMES DAILY
Qty: 21 CAPSULE | Refills: 0 | Status: SHIPPED | OUTPATIENT
Start: 2024-09-03 | End: 2024-09-06 | Stop reason: CLARIF

## 2024-09-03 RX ORDER — METHENAMINE HIPPURATE 1000 MG/1
1 TABLET ORAL 2 TIMES DAILY
Qty: 180 TABLET | Refills: 3 | Status: SHIPPED | OUTPATIENT
Start: 2024-09-03

## 2024-09-03 NOTE — PATIENT INSTRUCTIONS
[email or web address]/Directory/Menopause-[email or web address]    [email or web address]://www.[email or web address]/assets/[email or web address]/6/[email or web address]_[email or web address].pdf    [email or web address]://[email or web address]/assets/[email or web address]    [email or web address]://[email or web address]/media/GSM-[email or web address]-.pdf

## 2024-09-03 NOTE — PROGRESS NOTES
Patient to follow up UTIs, UGA, UUI  Given circumstances surrounding COVID-19 this visit is being conducted as a televisit with pt's consent.  Pt in safe, private environment for televisit, provider located in the office setting    She is currently using vag estrogen twice weekly    Took NTF suppression, h/o lung disease and pulm recommends no longer using NTF  Using d-mannose    Start uqora x4 wks, some improvement    Destrehan trigger in past, not clearly only trigger    UTIs persist    Bowels reg     S/p MUS, cysto    No current s/sx of UTI  Last UTI 6/24    Mirabegron 50mg daily, sx stable    HSV sx stable    Impression/Plan:    ICD-10-CM    1. Urge incontinence  N39.41       2. Frequent UTI  N39.0 cephALEXin 500 MG Oral Cap     methenamine 1 g Oral Tab      3. Postmenopausal atrophic vaginitis  N95.2 Estradiol (VAGIFEM) 10 MCG Vaginal Tab      4. Pelvic muscle wasting  N81.84       5. Post-operative state  Z98.890       6. HSV infection  B00.9           Discussion Items:   Discussed management of recurrent urinary tract infections. Discussed use of pharmacologic treatment options for suppression therapy. Risks vs benefits reviewed with patient  Wants to trial hiprex instead of UTIs now  Hiprex 1g BID with vit C  Call with s/sx of UTI  May have empiric keflex 500mg TID  with s/sx of UTI (if sx persist will need ucx)  Stop uqora    Discussed mgmt of vulvovaginal atrophy with vaginal estrogen cream. Reviewed associated benefits, risks, alternatives, and goals. Recommend low dose twice weekly mgmt   Cont vag estrogen, trial vag tablet 3x/wk    Discussed dietary and behavioral modifications for mgmt of urinary symptoms  Discussed weight management and benefits of weight loss on urinary symptoms  Reviewed AUA/SUFU guidelines on mgmt of non-neurogenic OAB  Discussed pharmacologic and nonpharmacologic mgmt options of urinary symptoms - reviewed risks, benefits, alternatives, and goals of treatment  Discussed specific  risks related to OAB meds including, but not limited to dry mouth, constipation, blurry vision, cognitive changes, and BP elevation.  Cont mirabegron 50mg daily    Discuss menopause with menopause specialist    All questions answered  She understands and agrees to plan    Return in about 3 months (around 12/3/2024) for UTI f/u phone.    Susana Hanson, , FACOG, FACS    The 21st Century Cures Act makes medical notes like these available to patients in the interest of transparency. However, be advised this is a medical document. It is intended as peer to peer communication. It is written in medical language and may contain abbreviations or verbiage that are unfamiliar. It may appear blunt or direct. Medical documents are intended to carry relevant information, facts as evident, and the clinical opinion of the practitioner.

## 2024-09-06 RX ORDER — CEPHALEXIN 500 MG/1
500 CAPSULE ORAL 3 TIMES DAILY
Qty: 21 CAPSULE | Refills: 0 | Status: SHIPPED | OUTPATIENT
Start: 2024-09-06

## 2024-09-20 ENCOUNTER — TELEPHONE (OUTPATIENT)
Dept: UROLOGY | Facility: CLINIC | Age: 49
End: 2024-09-20

## 2024-09-20 RX ORDER — CEPHALEXIN 500 MG/1
500 CAPSULE ORAL 3 TIMES DAILY
Qty: 21 CAPSULE | Refills: 0 | Status: SHIPPED | OUTPATIENT
Start: 2024-09-20

## 2024-09-20 NOTE — TELEPHONE ENCOUNTER
TC from pt saying she never rc'vd [facility]flex [facility] Dr Robertson [facility] on [facility] and wanted it sent to her local pharmacy, which I sent to [place] listed as her local pharmacy.   Pt reports this was never her local pharmacy. Wants [facility]ic [facility] for if she ever gets sx.   Cleared out 3 other pharmacies from pt list.  [facility]

## 2024-09-21 ENCOUNTER — OFFICE VISIT (OUTPATIENT)
Dept: FAMILY MEDICINE CLINIC | Facility: CLINIC | Age: 49
End: 2024-09-21
Payer: COMMERCIAL

## 2024-09-21 VITALS
WEIGHT: 191 LBS | SYSTOLIC BLOOD PRESSURE: 112 MMHG | HEIGHT: 69 IN | DIASTOLIC BLOOD PRESSURE: 72 MMHG | BODY MASS INDEX: 28.29 KG/M2 | HEART RATE: 88 BPM | TEMPERATURE: 98 F | RESPIRATION RATE: 16 BRPM

## 2024-09-21 DIAGNOSIS — Z12.11 SCREENING FOR COLON CANCER: ICD-10-CM

## 2024-09-21 DIAGNOSIS — E66.3 OVERWEIGHT (BMI 25.0-29.9): Primary | ICD-10-CM

## 2024-09-21 DIAGNOSIS — Z71.3 WEIGHT LOSS COUNSELING, ENCOUNTER FOR: ICD-10-CM

## 2024-09-21 PROCEDURE — 3008F BODY MASS INDEX DOCD: CPT | Performed by: NURSE PRACTITIONER

## 2024-09-21 PROCEDURE — G2211 COMPLEX E/M VISIT ADD ON: HCPCS | Performed by: NURSE PRACTITIONER

## 2024-09-21 PROCEDURE — 99214 OFFICE O/P EST MOD 30 MIN: CPT | Performed by: NURSE PRACTITIONER

## 2024-09-21 PROCEDURE — 3074F SYST BP LT 130 MM HG: CPT | Performed by: NURSE PRACTITIONER

## 2024-09-21 PROCEDURE — 3078F DIAST BP <80 MM HG: CPT | Performed by: NURSE PRACTITIONER

## 2024-09-21 RX ORDER — SEMAGLUTIDE 2.68 MG/ML
2 INJECTION, SOLUTION SUBCUTANEOUS WEEKLY
Qty: 9 ML | Refills: 0 | Status: SHIPPED | OUTPATIENT
Start: 2024-09-21

## 2024-09-21 NOTE — PROGRESS NOTES
Manisha Villarreal is a 49 year old female.  HPI:   Medication check/weight follow up. Patient has been taking her medications as ordered. No longer taking Phentermine. Has been focusing on diet and exercise. Weight this morning at home- 188 lbs, here in office 191 lbs.  Last colonoscopy- 2014- due, referral placed.  Would like to see Gynecologist specializing with menopause.    Wt Readings from Last 6 Encounters:   09/21/24 191 lb (86.6 kg)   07/07/24 195 lb (88.5 kg)   05/02/24 201 lb (91.2 kg)   04/12/24 200 lb (90.7 kg)   02/23/24 200 lb (90.7 kg)   01/10/24 204 lb (92.5 kg)     Current Outpatient Medications   Medication Sig Dispense Refill    semaglutide (OZEMPIC, 2 MG/DOSE,) 8 MG/3ML Subcutaneous Solution Pen-injector Inject 2 mg into the skin once a week. 9 mL 0    methenamine 1 g Oral Tab Take 1 tablet (1 g total) by mouth 2 (two) times daily. Take with 500mg Vitamin C 180 tablet 3    Estradiol (VAGIFEM) 10 MCG Vaginal Tab Place 10 mcg vaginally 3 (three) times a week. 24 tablet 3    Mirabegron ER (MYRBETRIQ) 50 MG Oral Tablet 24 Hr Take 1 tablet (50 mg total) by mouth daily. 90 tablet 0    cephalexin 500 MG Oral Cap Take 1 capsule (500 mg total) by mouth 3 (three) times daily. 21 capsule 0    cyanocobalamin 500 MCG Oral Tab Take 1 tablet (500 mcg total) by mouth daily.      valACYclovir 500 MG Oral Tab Take 1 tablet (500 mg total) by mouth 2 (two) times daily. 6 tablet 3    Levonorgest-Eth Estrad 91-Day (LOJAIMIESS) 0.1-0.02 & 0.01 MG Oral Tab TAKE 1 TABLET DAILY 91 tablet 2    Meloxicam 15 MG Oral Tab 1 tablet (15 mg total). Prn      methocarbamol 500 MG Oral Tab Take 1 tablet (500 mg total) by mouth as needed.      Multiple Vitamin (MULTI-DAY VITAMINS) Oral Tab Take by mouth daily.      IRON OR daily.      tiZANidine 2 MG Oral Tab Take 1 tablet (2 mg total) by mouth. 1/2 tab prn      montelukast 10 MG Oral Tab TAKE 1 TABLET BY MOUTH EVERY DAY AT NIGHT (Patient taking differently: prn) 30 tablet 0     celecoxib 100 MG Oral Cap Take 1 capsule (100 mg total) by mouth as needed. Takes once daily      Acidophilus/Pectin Oral Cap Take 1 capsule by mouth daily. Takes prn      Cholecalciferol (VITAMIN D3) 5000 UNITS Oral Tab Take 1 tablet (5,000 Units total) by mouth daily.      cephALEXin 500 MG Oral Cap Take 1 capsule (500 mg total) by mouth 3 (three) times daily. (Patient not taking: Reported on 9/21/2024) 21 capsule 0      Past Medical History:    Anesthesia complication    slow to wake after general anesthesia    Arthritis    Asthma (HCC)    exercise induced    Celiac disease (HCC)    Gallstones    H/O pleurisy    treated with course of steroids-followed by Dr Hancock/Pulmonologist-surgeon aware.    Vasculitis (HCC)    \"wagners vasculitis\"    Visual impairment    glasses and contacts      Social History:  Social History     Socioeconomic History    Marital status:    Tobacco Use    Smoking status: Never    Smokeless tobacco: Never   Vaping Use    Vaping status: Never Used   Substance and Sexual Activity    Alcohol use: No    Drug use: No    Sexual activity: Yes     Partners: Male     Birth control/protection: OCP   Other Topics Concern    Caffeine Concern No     Comment: 1-2 per week    Exercise Yes     Comment: silva 2 x a week     Social Determinants of Health     Financial Resource Strain: Low Risk  (11/22/2023)    Received from [facility]    Overall Financial Resource Strain (CARDIA)     Difficulty of Paying Living Expenses: Not very hard   Food Insecurity: No Food Insecurity (11/22/2023)    Received from [facility]    Hunger Vital Sign     Worried About Running Out of Food in the Last Year: Never true     Ran Out of Food in the Last Year: Never true   Transportation Needs: No Transportation Needs (11/22/2023)    Received from [facility]    PRAPARE - Transportation     Lack of Transportation (Medical): No     Lack of Transportation (Non-Medical): No   Physical  Activity: Insufficiently Active (11/22/2023)    Received from [facility]    Exercise Vital Sign     Days of Exercise per Week: 2 days     Minutes of Exercise per Session: 40 min   Housing Stability: Low Risk  (11/22/2023)    Received from [facility]    Housing Stability     What is your living situation today?: I have a steady place to live        REVIEW OF SYSTEMS:   GENERAL HEALTH: feels well otherwise  RESPIRATORY: denies shortness of breath with exertion  CARDIOVASCULAR: denies chest pain on exertion  GI: denies abdominal pain and denies heartburn  NEURO: denies headaches  Musculoskeletal: No motor deficits  EXAM:   /72   Pulse 88   Temp 97.6 °F (36.4 °C) (Temporal)   Resp 16   Ht 5' 9\" (1.753 m)   Wt 191 lb (86.6 kg)   LMP 02/01/2024 (Approximate)   BMI 28.21 kg/m²   GENERAL: well developed, well nourished,in no apparent distress  EYES: sclera clear without injection  NECK: supple,no adenopathy, thyroid normal to palpation  LUNGS: clear to auscultation no rales, rhonchi or wheezes  CARDIO: RRR without murmur  EXTREMITIES: no cyanosis, clubbing or edema +2 posterior tibial pulses  Musculoskeletal: No gross deficit  Psychological: Mood and affect are normal.  Good communication skills.  ASSESSMENT AND PLAN:     Encounter Diagnoses   Name Primary?    Overweight (BMI 25.0-29.9) Yes    Weight loss counseling, encounter for     Screening for colon cancer        1. Overweight (BMI 25.0-29.9)  - semaglutide (OZEMPIC, 2 MG/DOSE,) 8 MG/3ML Subcutaneous Solution Pen-injector; Inject 2 mg into the skin once a week.  Dispense: 9 mL; Refill: 0  - Basic Metabolic Panel (8) [E]; Future    2. Weight loss counseling, encounter for    3. Screening for colon cancer  - Gastro Referral - In Network       Orders Placed This Encounter   Procedures    Basic Metabolic Panel (8) [E]       Meds & Refills for this Visit:  Requested Prescriptions     Signed Prescriptions Disp Refills    semaglutide  (OZEMPIC, 2 MG/DOSE,) 8 MG/3ML Subcutaneous Solution Pen-injector 9 mL 0     Sig: Inject 2 mg into the skin once a week.       Imaging & Consults:  GASTRO - INTERNAL    Follow Up with:  No follow-up provider specified.      Continue Ozempic as ordered.  Continue to focus on healthy eating, routine exercise, weight loss. Body mass index is 28.21 kg/m².  A1c 03/2024- 5.2%.  GI referral placed- colonoscopy/colon cancer screening.  Provided gyne info      The patient indicates understanding of these issues and agrees to the plan.  The patient is asked to return in 02/2025 annual physical.

## 2024-10-25 ENCOUNTER — HOSPITAL ENCOUNTER (OUTPATIENT)
Dept: CT IMAGING | Facility: HOSPITAL | Age: 49
Discharge: HOME | End: 2024-10-25
Attending: INTERNAL MEDICINE
Payer: COMMERCIAL

## 2024-10-25 DIAGNOSIS — R91.8 LUNG NODULES: ICD-10-CM

## 2024-10-25 PROCEDURE — 71250 CT THORAX DX C-: CPT | Performed by: INTERNAL MEDICINE

## 2024-11-01 ENCOUNTER — OFFICE VISIT (OUTPATIENT)
Facility: CLINIC | Age: 49
End: 2024-11-01
Payer: COMMERCIAL

## 2024-11-01 VITALS
HEART RATE: 78 BPM | BODY MASS INDEX: 28.44 KG/M2 | HEIGHT: 69 IN | DIASTOLIC BLOOD PRESSURE: 68 MMHG | RESPIRATION RATE: 16 BRPM | SYSTOLIC BLOOD PRESSURE: 118 MMHG | WEIGHT: 192 LBS | OXYGEN SATURATION: 100 %

## 2024-11-01 DIAGNOSIS — R91.8 LUNG NODULES: Primary | ICD-10-CM

## 2024-11-01 NOTE — PATIENT INSTRUCTIONS
Call office with any questions or concerns  Call office if develop any new or worsening respiratory symptoms.   Call central scheduling at [phone] to schedule the CT scan  in one year

## 2024-11-01 NOTE — PROGRESS NOTES
[facility] General Pulmonary Progress Note    History of Present Illness:  Manisha Villarreal is a 49 year old female who presents today for follow up of CT scan. Overall feels great. Has been receiving CT scans over the past 10 years after incidental nodules found. All scans waxing and waning nodularity bilaterally. She has been to [facility] for pain in chest, no answer as to why having pain, all test negative, pain comes and goes over the years; treated with prednisone, stopped taking Macrobid per [facility] doctor and since then has not had pain in her chest. Currently no respiratory issues, has been following Fleischner guidelines for scans.   Denies cough, SOB (history of asthma), fevers, chills, chest tightness, pain in chest.     Previously 4/2024 Dr Arreguin   Manisha Villarreal is a 48 year old female never smoker with significant PMH of asthma, pulmonary nodules who presents today for follow up of lung nodules. Since last visit she completed the steroid regimen in January. Her back resolved around Feb 2024. She feels overall well at present. Does think she has noticed some mild dyspnea which can be at rest, feels at times sometimes needs to take deep breath.  No real exertional symptoms. No cough, no pain. No fevers.  Has not been active.      Dec 2023 previously  Since last visit she was seen at [facility] regarding the nodules. She had a repeat CT chest showing the nodules mainly had improved, although small number were either stable or slightly worse. She had PFTs which were reported normal.  She denies new concerns today - remains on prednisone regimen and tapering 2.5mg every 2 weeks, currently on 10mg daily.      October 2023 previously  She presents s/p bronchoscopy. She denies new concerns today.  Has been on pred 10 daily for her back pain but notes some days are worse than others.   No dyspnea, cough or phlegm. No f/c/s. No chest pain     10/2023 previously  She underwent bronchoscopy last week with no issues post  procedure although the initial post procedure CXR did raise concern for pneumothorax, but there was no evidence on repeat chest film. She denies acute concerns today. No cough or dyspnea. Her back pain is minimal and remains on pred 10mg daily.      September 2023 previously  She has followed with Dr. Hancock for lung nodules in the past and had a previous CT guided biopsy only showing granulomas in the past.  She has had intermittent prednisone regimens for pain, which she relates back pain previously preceded her initial presently nearly a decade ago.  She has had workup with Dr. Hancock and Dr. Viera with suspicious for GPA however her labs have been disconcordant to clinical suspicion.   She reports having felt well until August 2023 when she again developed flank and back pain. She tried to manage at home but given persistence she went to ER and had CT chest showing several new lung nodules in the RML. She denies any dyspnea, wheeze, chest pain/pressure. No f/c/s. No weight changes. She does endorse minimal coughing but no phlegm over the past month.  She was given prednisone 40mg x 5 days with improvement in her pain however after completing the prednisone, her pain returned in her mid back (different than earlier).   She does have a history of asthma but has been well controlled and previously only used albuterol, but given lack of symptoms she no longer has albuterol inhaler.     Past Medical History:   Past Medical History:    Anemia    Anesthesia complication    slow to wake after general anesthesia    Arthritis    Asthma (HCC)    exercise induced    Celiac disease (HCC)    Fatigue    Flatulence/gas pain/belching    Frequent UTI    Gallstones    H/O pleurisy    treated with course of steroids-followed by Dr Hancock/Pulmonologist-surgeon aware.    Mouth sores    Sleep disturbance    Stress    Vasculitis (HCC)    \"wagners vasculitis\"    Visual impairment    glasses and contacts    Wears glasses         Past Surgical History:   Past Surgical History:   Procedure Laterality Date    Colonoscopy      2014    Colonoscopy      Dilation/curettage,diagnostic      Excision turbinate,submucous  11/27/2020    Eye surgery Right 03/2015    repair of retinal tear    Laparoscopic cholecystectomy  10/10/2013    Procedure: LAPAROSCOPIC CHOLECYSTECTOMY;  Surgeon: Elpidio Messer MD;  Location: [facility] MAIN OR    Needle biopsy left      9/22 benign.    Other surgical history  02/2015    Bladder Mesh    Other surgical history Left 09/30/2022    breast bx (benign)    Repair of nasal septum  11/27/2020    Upper gi endoscopy,exam      biopsies        Family Medical History:   Family History   Problem Relation Age of Onset    Heart Attack Mother 66        Acute MI    High Cholesterol Mother         Hypercholesterolemia    Heart Disease Mother     Obesity Mother     High Cholesterol Father         Hypercholesterolemia    Heart Disorder Father         Palpitations    Obesity Sister     Diabetes Brother     Diabetes Brother     Breast Cancer Other     Cancer Neg         Social History:   Social History     Socioeconomic History    Marital status:      Spouse name: Not on file    Number of children: Not on file    Years of education: Not on file    Highest education level: Not on file   Occupational History    Not on file   Tobacco Use    Smoking status: Never    Smokeless tobacco: Never   Vaping Use    Vaping status: Never Used   Substance and Sexual Activity    Alcohol use: No    Drug use: No    Sexual activity: Yes     Partners: Male     Birth control/protection: OCP   Other Topics Concern     Service Not Asked    Blood Transfusions Not Asked    Caffeine Concern No     Comment: 1-2 per week    Occupational Exposure Not Asked    Hobby Hazards Not Asked    Sleep Concern Not Asked    Stress Concern Not Asked    Weight Concern Not Asked    Special Diet Not Asked    Back Care Not Asked    Exercise Yes     Comment: silva tom  week    Bike Helmet Not Asked    Seat Belt Not Asked    Self-Exams Not Asked   Social History Narrative    Not on file     Social Drivers of Health     Financial Resource Strain: Low Risk  (11/22/2023)    Received from [facility]    Overall Financial Resource Strain (CARDIA)     Difficulty of Paying Living Expenses: Not very hard   Food Insecurity: No Food Insecurity (11/22/2023)    Received from [facility]    Hunger Vital Sign     Worried About Running Out of Food in the Last Year: Never true     Ran Out of Food in the Last Year: Never true   Transportation Needs: No Transportation Needs (11/22/2023)    Received from [facility]    PRAPARE - Transportation     Lack of Transportation (Medical): No     Lack of Transportation (Non-Medical): No   Physical Activity: Insufficiently Active (11/22/2023)    Received from [facility]    Exercise Vital Sign     Days of Exercise per Week: 2 days     Minutes of Exercise per Session: 40 min   Stress: Not on file   Social Connections: Not on file   Housing Stability: Low Risk  (11/22/2023)    Received from [facility]    Housing Stability     What is your living situation today?: I have a steady place to live        Medications:   Current Outpatient Medications   Medication Sig Dispense Refill    PEG 3350-KCl-Na Bicarb-NaCl 420 g Oral Recon Soln Take as directed by physician 4000 mL 0    semaglutide (OZEMPIC, 2 MG/DOSE,) 8 MG/3ML Subcutaneous Solution Pen-injector Inject 2 mg into the skin once a week. 9 mL 0    methenamine 1 g Oral Tab Take 1 tablet (1 g total) by mouth 2 (two) times daily. Take with 500mg Vitamin C 180 tablet 3    Estradiol (VAGIFEM) 10 MCG Vaginal Tab Place 10 mcg vaginally 3 (three) times a week. 24 tablet 3    Mirabegron ER (MYRBETRIQ) 50 MG Oral Tablet 24 Hr Take 1 tablet (50 mg total) by mouth daily. 90 tablet 0    cyanocobalamin 500 MCG Oral Tab Take 1 tablet (500 mcg total) by mouth  daily.      valACYclovir 500 MG Oral Tab Take 1 tablet (500 mg total) by mouth 2 (two) times daily. 6 tablet 3    Levonorgest-Eth Estrad 91-Day (LOJAIMIESS) 0.1-0.02 & 0.01 MG Oral Tab TAKE 1 TABLET DAILY 91 tablet 2    Meloxicam 15 MG Oral Tab 1 tablet (15 mg total). Prn      methocarbamol 500 MG Oral Tab Take 1 tablet (500 mg total) by mouth as needed.      Multiple Vitamin (MULTI-DAY VITAMINS) Oral Tab Take by mouth daily.      IRON OR daily.      tiZANidine 2 MG Oral Tab Take 1 tablet (2 mg total) by mouth. 1/2 tab prn      montelukast 10 MG Oral Tab TAKE 1 TABLET BY MOUTH EVERY DAY AT NIGHT (Patient taking differently: prn) 30 tablet 0    celecoxib 100 MG Oral Cap Take 1 capsule (100 mg total) by mouth as needed. Takes once daily      Acidophilus/Pectin Oral Cap Take 1 capsule by mouth daily. Takes prn      Cholecalciferol (VITAMIN D3) 5000 UNITS Oral Tab Take 1 tablet (5,000 Units total) by mouth daily.      cephalexin 500 MG Oral Cap Take 1 capsule (500 mg total) by mouth 3 (three) times daily. (Patient not taking: Reported on 11/1/2024) 21 capsule 0       Review of Systems: Review of Systems   All other systems reviewed and are negative.       Physical Exam:  /68 (BP Location: Right arm, Patient Position: Sitting, Cuff Size: adult)   Pulse 78   Resp 16   Ht 5' 9\" (1.753 m)   Wt 192 lb (87.1 kg)   LMP 02/01/2024 (Approximate)   SpO2 100%   BMI 28.35 kg/m²      Constitutional: alert, cooperative. No acute distress.  HEENT: Head NC/AT.   Cardio: Regular rate and rhythm. Normal S1 and S2. No murmurs.   Respiratory: Thorax symmetrical with no labored breathing. Clear to ausculation bilaterally with symmetrical breath sounds. No wheezing, rhonchi, rales, or crackles.   Extremities: No clubbing or cyanosis. \No BLE edema.    Neurologic: A&Ox3. No gross motor deficits.  Skin: Warm, dry  Psych: Calm, cooperative. Pleasant affect.    Results:  Personally reviewed    WBC: 10.6, done on 5/3/2024.  HGB:  13.2, done on 5/3/2024.  PLT: 359, done on 5/3/2024.     Glucose: 87, done on 3/15/2024.  Cr: 0.79, done on 3/15/2024.  Last eGFR was 92 on 3/15/2024.  CA: 9, done on 3/15/2024.  Na: 137, done on 3/15/2024.  K: 3.8, done on 3/15/2024.  Cl: 110, done on 3/15/2024.  CO2: 24, done on 3/15/2024.  Last ALB was 3.2% done on 3/15/2024.     CT CHEST LD NO CAD(CPT=71250)    Result Date: 10/25/2024  CONCLUSION:  Waxing and waning nodularity bilaterally.  There is several new right-sided pulmonary nodules largest in the right upper lobe measuring 6.5 mm, image 80 and right lung apex posteriorly measuring 5.7 mm, image 44. Resolution of dominant nodule in the lingula in previously noted right middle lobe pulmonary nodule.  Stable dominant right lower lobe pulmonary nodule measuring 6 x 7 mm, image 159.  Continued short-term follow-up recommended.   LOCATION:  [facility]   Dictated by (CST): Gely Monroe MD on 10/25/2024 at 12:06 PM     Finalized by (CST): Gely Monroe MD on 10/25/2024 at 12:17 PM         Assessment/Plan:  #1. Lung nodules  Ongoing and wax/waning for nearly a decade  Previous CT guided biopsy in 2016 with acute/chronic granulomatous inflammation  9/9/2023 CT chest with new RML nodules, question of right hilar LAD  9/25/2023 CT bronch imaging with new RUL GGO, decrease in nodules in RML. Stable RLL and LLL nodules  9/25/2023 robotic assisted bronch with bx of RML and EBUS bx of 11R: RML path with hemosiderin laden macrophages and fibroelastic scarring. No malignancy. Cx remain ngtd  11/2023 [facility] CT chest report reviewed - increased number of RUL GGO, several decreased in RUL and RML. , slight increase in RLL 8mm nodule. No LAD  4/2024 PET/CT with resolution of previous nodules, does have a few tiny new nodules as well as 9x5 (average 7mm) nodule in lingula.  There is no abnormal activity noted  4/2024 Would plan on repeat CT chest in six months per Fleischner guidelines. No plans for biopsies  10/2024 CT Chest  There is several new right-sided pulmonary nodules largest in the right upper lobe measuring 6.5 mm, image 80 and right lung apex posteriorly measuring 5.7 mm, image 44. Resolution of dominant nodule in the lingula in previously noted right middle lobe pulmonary nodule. Stable dominant right lower lobe pulmonary nodule measuring 6 x 7 mm. We reviewed the results in detail.   Recommended CT 3 months Jan 2025 per Fleischner guidelines; Does not want CT in 3 months, stated has been receiving scans over 10 years and they are always changing, educated on the guidelines; voiced understanding on why it is recommended, is agreeable to one year CT Chest follow up  11/2024 She will f/u Dr. Viera today as well - previously had been treated for presumed GPA; received rituximab in 2017 and methotrexate 2018.     #2. Back pain  May be related to above inflammatory disorder vs other such as musculoskeletal  Now resolved post slow pred wean     #3. Asthma  Reported hx of asthma  No current symptoms       Cathy Hunter [facility]  Pulmonary Medicine   11/1/2024

## 2024-11-09 DIAGNOSIS — N39.41 URGE INCONTINENCE: ICD-10-CM

## 2024-11-12 RX ORDER — MIRABEGRON 50 MG/1
50 TABLET, FILM COATED, EXTENDED RELEASE ORAL DAILY
Qty: 90 TABLET | Refills: 3 | Status: SHIPPED | OUTPATIENT
Start: 2024-11-12

## 2024-11-27 PROBLEM — Z12.11 SPECIAL SCREENING FOR MALIGNANT NEOPLASMS, COLON: Status: ACTIVE | Noted: 2024-11-27

## 2024-12-03 ENCOUNTER — TELEPHONE (OUTPATIENT)
Dept: UROLOGY | Facility: CLINIC | Age: 49
End: 2024-12-03

## 2024-12-03 ENCOUNTER — VIRTUAL PHONE E/M (OUTPATIENT)
Dept: UROLOGY | Facility: CLINIC | Age: 49
End: 2024-12-03
Attending: OBSTETRICS & GYNECOLOGY
Payer: COMMERCIAL

## 2024-12-03 DIAGNOSIS — B00.9 HSV INFECTION: ICD-10-CM

## 2024-12-03 DIAGNOSIS — N81.84 PELVIC MUSCLE WASTING: ICD-10-CM

## 2024-12-03 DIAGNOSIS — N95.2 POSTMENOPAUSAL ATROPHIC VAGINITIS: ICD-10-CM

## 2024-12-03 DIAGNOSIS — N39.0 FREQUENT UTI: ICD-10-CM

## 2024-12-03 DIAGNOSIS — N39.41 URGE INCONTINENCE: Primary | ICD-10-CM

## 2024-12-03 NOTE — PROGRESS NOTES
Patient to follow up UUI, UTIs  Given circumstances surrounding COVID-19 this visit is being conducted as a televisit with pt's consent.  Pt in safe, private environment for televisit, provider located in the office setting    She is currently using vag estrogen twice weekly  Hiprex 1g BID with vit C - took x 1 mo and stopped about 6 wks ago  Stopped given back pain, unclear if related  Pulm recommended cessation of NTF given pain/suspected lung issues    No recent UTIs  Still taking vit C for immune support    Mirabegron 50mg daily    She reports +improvement     HSV stable    Impression/Plan:    ICD-10-CM    1. Urge incontinence  N39.41       2. HSV infection  B00.9       3. Frequent UTI  N39.0       4. Postmenopausal atrophic vaginitis  N95.2       5. Pelvic muscle wasting  N81.84           Discussion Items:   Discussed management of recurrent urinary tract infections. Discussed use of pharmacologic treatment options for suppression therapy. Risks vs benefits reviewed with patient  Call with s/sx of UTI  May have empiric keflex 500mg TID  with s/sx of UTI (if sx persist will need ucx)     Discussed mgmt of vulvovaginal atrophy with vaginal estrogen cream. Reviewed associated benefits, risks, alternatives, and goals. Recommend low dose twice weekly mgmt   Cont vag estrogen     Discussed dietary and behavioral modifications for mgmt of urinary symptoms  Discussed weight management and benefits of weight loss on urinary symptoms  Reviewed AUA/SUFU guidelines on mgmt of non-neurogenic OAB  Discussed pharmacologic and nonpharmacologic mgmt options of urinary symptoms - reviewed risks, benefits, alternatives, and goals of treatment  Discussed specific risks related to OAB meds including, but not limited to dry mouth, constipation, blurry vision, cognitive changes, and BP elevation.  Cont mirabegron 50mg daily    All questions answered  She understands and agrees to plan    Return in about 1 year (around  12/3/2025).    Susana Hanson DO, PUSHPA, AURY    The 21st Century Cures Act makes medical notes like these available to patients in the interest of transparency. However, be advised this is a medical document. It is intended as peer to peer communication. It is written in medical language and may contain abbreviations or verbiage that are unfamiliar. It may appear blunt or direct. Medical documents are intended to carry relevant information, facts as evident, and the clinical opinion of the practitioner.

## 2024-12-16 ENCOUNTER — TELEPHONE (OUTPATIENT)
Dept: FAMILY MEDICINE CLINIC | Facility: CLINIC | Age: 49
End: 2024-12-16

## 2024-12-16 DIAGNOSIS — E66.3 OVERWEIGHT (BMI 25.0-29.9): ICD-10-CM

## 2024-12-16 NOTE — TELEPHONE ENCOUNTER
Did not pass protocol  Last office visit 9/21/2024  Last refill was: , 9/21/2024__tabs  Next appointment: 9/21/2024    Please sign pended medication if appropriate     Medication Quantity Refills Start End   semaglutide (OZEMPIC, 2 MG/DOSE,) 8 MG/3ML Subcutaneous Solution Pen-injector 9 mL 0 9/21/2024 --   Sig:   Inject 2 mg into the skin once a week.     Route:   Subcutaneous

## 2024-12-17 RX ORDER — SEMAGLUTIDE 2.68 MG/ML
2 INJECTION, SOLUTION SUBCUTANEOUS WEEKLY
Qty: 9 ML | Refills: 0 | Status: SHIPPED | OUTPATIENT
Start: 2024-12-17

## 2024-12-17 NOTE — TELEPHONE ENCOUNTER
Patient called back. I informed her she is due to have a CMP and a HGBA1C now. Last HGBA1C was in 03/2024. Prescription refill for Ozempic was this am. Patient agreed to plan and verbalized understanding.

## 2024-12-21 ENCOUNTER — LAB ENCOUNTER (OUTPATIENT)
Dept: LAB | Age: 49
End: 2024-12-21
Attending: NURSE PRACTITIONER
Payer: COMMERCIAL

## 2024-12-21 DIAGNOSIS — E66.3 OVERWEIGHT (BMI 25.0-29.9): ICD-10-CM

## 2024-12-21 LAB
ALBUMIN SERPL-MCNC: 4.2 G/DL (ref 3.2–4.8)
ALBUMIN/GLOB SERPL: 1.4 {RATIO} (ref 1–2)
ALP LIVER SERPL-CCNC: 121 U/L
ALT SERPL-CCNC: 21 U/L
ANION GAP SERPL CALC-SCNC: 8 MMOL/L (ref 0–18)
AST SERPL-CCNC: 18 U/L (ref ?–34)
BILIRUB SERPL-MCNC: 0.4 MG/DL (ref 0.3–1.2)
BUN BLD-MCNC: 9 MG/DL (ref 9–23)
CALCIUM BLD-MCNC: 9.3 MG/DL (ref 8.7–10.4)
CHLORIDE SERPL-SCNC: 106 MMOL/L (ref 98–112)
CO2 SERPL-SCNC: 26 MMOL/L (ref 21–32)
CREAT BLD-MCNC: 0.77 MG/DL
EGFRCR SERPLBLD CKD-EPI 2021: 95 ML/MIN/1.73M2 (ref 60–?)
EST. AVERAGE GLUCOSE BLD GHB EST-MCNC: 103 MG/DL (ref 68–126)
FASTING STATUS PATIENT QL REPORTED: YES
GLOBULIN PLAS-MCNC: 3.1 G/DL (ref 2–3.5)
GLUCOSE BLD-MCNC: 85 MG/DL (ref 70–99)
HBA1C MFR BLD: 5.2 % (ref ?–5.7)
OSMOLALITY SERPL CALC.SUM OF ELEC: 288 MOSM/KG (ref 275–295)
POTASSIUM SERPL-SCNC: 4 MMOL/L (ref 3.5–5.1)
PROT SERPL-MCNC: 7.3 G/DL (ref 5.7–8.2)
SODIUM SERPL-SCNC: 140 MMOL/L (ref 136–145)

## 2024-12-21 PROCEDURE — 83036 HEMOGLOBIN GLYCOSYLATED A1C: CPT | Performed by: NURSE PRACTITIONER

## 2024-12-21 PROCEDURE — 80053 COMPREHEN METABOLIC PANEL: CPT | Performed by: NURSE PRACTITIONER

## 2024-12-26 ENCOUNTER — PATIENT MESSAGE (OUTPATIENT)
Dept: FAMILY MEDICINE CLINIC | Facility: CLINIC | Age: 49
End: 2024-12-26

## 2024-12-27 ENCOUNTER — LAB ENCOUNTER (OUTPATIENT)
Dept: LAB | Age: 49
End: 2024-12-27
Attending: NURSE PRACTITIONER
Payer: COMMERCIAL

## 2024-12-27 DIAGNOSIS — E66.3 OVERWEIGHT (BMI 25.0-29.9): ICD-10-CM

## 2024-12-27 DIAGNOSIS — R74.8 ELEVATED ALKALINE PHOSPHATASE LEVEL: ICD-10-CM

## 2024-12-27 LAB
ANION GAP SERPL CALC-SCNC: 6 MMOL/L (ref 0–18)
BUN BLD-MCNC: 7 MG/DL (ref 9–23)
CALCIUM BLD-MCNC: 9.2 MG/DL (ref 8.7–10.4)
CHLORIDE SERPL-SCNC: 107 MMOL/L (ref 98–112)
CO2 SERPL-SCNC: 23 MMOL/L (ref 21–32)
CREAT BLD-MCNC: 0.77 MG/DL
EGFRCR SERPLBLD CKD-EPI 2021: 95 ML/MIN/1.73M2 (ref 60–?)
FASTING STATUS PATIENT QL REPORTED: YES
GLUCOSE BLD-MCNC: 113 MG/DL (ref 70–99)
OSMOLALITY SERPL CALC.SUM OF ELEC: 281 MOSM/KG (ref 275–295)
POTASSIUM SERPL-SCNC: 3.8 MMOL/L (ref 3.5–5.1)
SODIUM SERPL-SCNC: 136 MMOL/L (ref 136–145)

## 2024-12-27 PROCEDURE — 84080 ASSAY ALKALINE PHOSPHATASES: CPT

## 2024-12-27 PROCEDURE — 36415 COLL VENOUS BLD VENIPUNCTURE: CPT

## 2024-12-27 PROCEDURE — 84075 ASSAY ALKALINE PHOSPHATASE: CPT

## 2024-12-27 PROCEDURE — 80048 BASIC METABOLIC PNL TOTAL CA: CPT

## 2024-12-27 NOTE — TELEPHONE ENCOUNTER
Tylenol/Tylenol PM would not fall into the category of NSAID. Limiting NSAIDs would be limiting Ibuprofen, Motrin, Advil, Aleve. If she is taking Tylenol PM nightly- should follow up with Dr. Don to discuss further.

## 2024-12-30 LAB
ALK PHOSPHATASE: 137 IU/L
BONE FRAC: 21 %
INTESTINAL FRAC: 0 %
LIVER FRAC: 79 %

## 2025-01-23 ENCOUNTER — TELEPHONE (OUTPATIENT)
Facility: CLINIC | Age: 50
End: 2025-01-23

## 2025-01-30 ENCOUNTER — TELEPHONE (OUTPATIENT)
Facility: CLINIC | Age: 50
End: 2025-01-30

## 2025-02-14 ENCOUNTER — OFFICE VISIT (OUTPATIENT)
Dept: FAMILY MEDICINE CLINIC | Facility: CLINIC | Age: 50
End: 2025-02-14
Payer: COMMERCIAL

## 2025-02-14 VITALS
WEIGHT: 193.38 LBS | BODY MASS INDEX: 28.64 KG/M2 | RESPIRATION RATE: 16 BRPM | DIASTOLIC BLOOD PRESSURE: 74 MMHG | SYSTOLIC BLOOD PRESSURE: 114 MMHG | HEIGHT: 69 IN | HEART RATE: 88 BPM | TEMPERATURE: 98 F

## 2025-02-14 DIAGNOSIS — Z13.89 SCREENING FOR GENITOURINARY CONDITION: ICD-10-CM

## 2025-02-14 DIAGNOSIS — Z00.00 ANNUAL PHYSICAL EXAM: Primary | ICD-10-CM

## 2025-02-14 DIAGNOSIS — E66.3 OVERWEIGHT (BMI 25.0-29.9): ICD-10-CM

## 2025-02-14 DIAGNOSIS — Z00.00 LABORATORY EXAMINATION ORDERED AS PART OF A ROUTINE GENERAL MEDICAL EXAMINATION: ICD-10-CM

## 2025-02-14 DIAGNOSIS — G47.00 INSOMNIA, UNSPECIFIED TYPE: ICD-10-CM

## 2025-02-14 DIAGNOSIS — Z76.89 ENCOUNTER FOR WEIGHT MANAGEMENT: ICD-10-CM

## 2025-02-14 DIAGNOSIS — R00.2 INTERMITTENT PALPITATIONS: ICD-10-CM

## 2025-02-14 DIAGNOSIS — R00.0 INCREASED HEART RATE: ICD-10-CM

## 2025-02-14 DIAGNOSIS — Z71.3 WEIGHT LOSS COUNSELING, ENCOUNTER FOR: ICD-10-CM

## 2025-02-14 RX ORDER — HYDROXYZINE HYDROCHLORIDE 25 MG/1
TABLET, FILM COATED ORAL NIGHTLY PRN
Qty: 30 TABLET | Refills: 0 | Status: SHIPPED | OUTPATIENT
Start: 2025-02-14

## 2025-02-14 RX ORDER — SEMAGLUTIDE 2.68 MG/ML
2 INJECTION, SOLUTION SUBCUTANEOUS WEEKLY
Qty: 9 ML | Refills: 1 | Status: SHIPPED | OUTPATIENT
Start: 2025-02-14

## 2025-02-14 NOTE — PROGRESS NOTES
[facility]  02/14/25    Chief Complaint   Patient presents with    Physical     HPI:   Manisha Villarreal is a 49 year old female who presents for a complete physical exam.     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  Immunizations: got flu shot  Screenings: up to date with colonoscopy, follows with gynecology    Patient is taking ozempic 2mg subQ weekly. Doing well, started on this 3 years ago. Has lost 43lbs so far since starting. Noticed decreased appetite. Will have two good meals a day and a snack. Feeling more like herself. No issues with constipation. Keeps up with fiber gummies.     Patient has had inflammatory issues for over a decade. Would have back pain flare up intermittently. Also has some seasonal affective changes in mood.    Follows with gynecology. Has hx of urinary incontinence and follows with Dr. Hanson. Was having recurrent UTI's.     Went to [facility] two years ago for pulmonology. Was advised to stop taking a medication for UTI's. Taking d-mannose and cranberry supplement.    Had colonoscopy in the fall. Takes NSAIDs on occasion.     Follows with rheumatology.     Slightly more headaches over the past month.     Poor sleep. Taking tylenol PM or half a tylenol PM or benadryl nightly. Has early awakenings, will think about lots of things. Difficulty turning brain off at nighttime. Will wake up hearing her   snore loudly.    Sometimes heart rate picks up. Wore the oura ring once to sleep and heart rate was fast. Sometimes will notice heartbeat going faster. Sometimes feels shortness of breath. Growing up had asthma.     Follows with pulmonology. Hx pleurisy and lung nodules.    Daughter has PVC's.    Taking align for a few months.     Wt Readings from Last 6 Encounters:   02/14/25 193 lb 6.4 oz (87.7 kg)   11/01/24 192 lb (87.1 kg)   10/29/24 189 lb (85.7 kg)   09/21/24 191 lb (86.6 kg)   07/07/24 195 lb (88.5 kg)   05/02/24 201 lb (91.2 kg)     Body mass index is 28.56 kg/m².     Results for orders placed or performed in visit on 12/27/24   Alk Phos Isoenzyme [E]    Collection Time: 12/27/24 11:39 AM   Result Value Ref Range    Alk Phosphatase 137 (H) 44 - 121 IU/L    Liver Frac 79 18 - 85 %    Bone Frac 21 14 - 68 %    Intestinal Frac 0 0 - 18 %   Basic Metabolic Panel (8) [E]    Collection Time: 12/27/24 11:39 AM   Result Value Ref Range    Glucose 113 (H) 70 - 99 mg/dL    Sodium 136 136 - 145 mmol/L    Potassium 3.8 3.5 - 5.1 mmol/L    Chloride 107 98 - 112 mmol/L    CO2 23.0 21.0 - 32.0 mmol/L    Anion Gap 6 0 - 18 mmol/L    BUN 7 (L) 9 - 23 mg/dL    Creatinine 0.77 0.55 - 1.02 mg/dL    Calcium, Total 9.2 8.7 - 10.4 mg/dL    Calculated Osmolality 281 275 - 295 mOsm/kg    eGFR-Cr 95 >=60 mL/min/1.73m2    Patient Fasting for BMP? Yes      *Note: Due to a large number of results and/or encounters for the requested time period, some results have not been displayed. A complete set of results can be found in Results Review.       Current Outpatient Medications   Medication Sig Dispense Refill    hydrOXYzine 25 MG Oral Tab Take 1-2 tablets (25-50 mg total) by mouth nightly as needed for Anxiety (sleep). 30 tablet 0    semaglutide (OZEMPIC, 2 MG/DOSE,) 8 MG/3ML Subcutaneous Solution Pen-injector Inject 2 mg into the skin once a week. 9 mL 1    MIRABEGRON ER 50 MG Oral Tablet 24 Hr TAKE 1 TABLET DAILY 90 tablet 3     Estradiol (VAGIFEM) 10 MCG Vaginal Tab Place 10 mcg vaginally 3 (three) times a week. 24 tablet 3    cyanocobalamin 500 MCG Oral Tab Take 1 tablet (500 mcg total) by mouth daily.      valACYclovir 500 MG Oral Tab Take 1 tablet (500 mg total) by mouth 2 (two) times daily. 6 tablet 3    Levonorgest-Eth Estrad 91-Day (LOJAIMIESS) 0.1-0.02 & 0.01 MG Oral Tab TAKE 1 TABLET DAILY 91 tablet 2    Meloxicam 15 MG Oral Tab 1 tablet (15 mg total). Prn      Multiple Vitamin (MULTI-DAY VITAMINS) Oral Tab Take by mouth daily.      IRON OR daily.      tiZANidine 2 MG Oral Tab Take 1 tablet (2 mg total) by mouth. 1/2 tab prn      montelukast 10 MG Oral Tab TAKE 1 TABLET BY MOUTH EVERY DAY AT NIGHT (Patient taking differently: prn) 30 tablet 0    celecoxib 100 MG Oral Cap Take 1 capsule (100 mg total) by mouth as needed. Takes once daily      Acidophilus/Pectin Oral Cap Take 1 capsule by mouth daily. Takes prn      Cholecalciferol (VITAMIN D3) 5000 UNITS Oral Tab Take 1 tablet (5,000 Units total) by mouth daily.      cephalexin 500 MG Oral Cap Take 1 capsule (500 mg total) by mouth 3 (three) times daily. (Patient not taking: Reported on 2/14/2025) 21 capsule 0    methocarbamol 500 MG Oral Tab Take 1 tablet (500 mg total) by mouth as needed. (Patient not taking: Reported on 2/14/2025)        Allergies[1]   Past Medical History:    Anemia    Anesthesia complication    slow to wake after general anesthesia    Arthritis    Asthma (HCC)    exercise induced    Celiac disease (HCC)    Fatigue    Flatulence/gas pain/belching    Frequent UTI    Gallstones    H/O pleurisy    treated with course of steroids-followed by Dr Hancock/Pulmonologist-surgeon aware.    Mouth sores    Sleep disturbance    Stress    Vasculitis    \"wagners vasculitis\"    Visual impairment    glasses and contacts    Wears glasses      Past Surgical History:   Procedure Laterality Date    Colonoscopy      2014    Colonoscopy      Dilation/curettage,diagnostic       Excision turbinate,submucous  11/27/2020    Eye surgery Right 03/2015    repair of retinal tear    Laparoscopic cholecystectomy  10/10/2013    Procedure: LAPAROSCOPIC CHOLECYSTECTOMY;  Surgeon: Elpidio Messer MD;  Location: [facility] MAIN OR    Needle biopsy left      9/22 benign.    Other surgical history  02/2015    Bladder Mesh    Other surgical history Left 09/30/2022    breast bx (benign)    Repair of nasal septum  11/27/2020    Upper gi endoscopy,exam      biopsies       Family History   Problem Relation Age of Onset    Heart Attack Mother 66        Acute MI    High Cholesterol Mother         Hypercholesterolemia    Heart Disease Mother     Obesity Mother     High Cholesterol Father         Hypercholesterolemia    Heart Disorder Father         Palpitations    Obesity Sister     Diabetes Brother     Diabetes Brother     Breast Cancer Other     Cancer Neg       Social History:   Social History     Socioeconomic History    Marital status:    Tobacco Use    Smoking status: Never    Smokeless tobacco: Never   Vaping Use    Vaping status: Never Used   Substance and Sexual Activity    Alcohol use: No    Drug use: No    Sexual activity: Yes     Partners: Male     Birth control/protection: OCP   Other Topics Concern    Caffeine Concern No     Comment: 1-2 per week    Exercise Yes     Comment: silva 2 x a week     Social Drivers of Health     Food Insecurity: No Food Insecurity (11/22/2023)    Received from [facility]    Hunger Vital Sign     Worried About Running Out of Food in the Last Year: Never true     Ran Out of Food in the Last Year: Never true   Transportation Needs: No Transportation Needs (11/22/2023)    Received from [facility]    PRAPARE - Transportation     Lack of Transportation (Medical): No     Lack of Transportation (Non-Medical): No   Housing Stability: Low Risk  (11/22/2023)    Received from [facility]    Housing Stability     What is your living situation  today?: I have a steady place to live        REVIEW OF SYSTEMS:   GENERAL: feels well otherwise  SKIN: denies any unusual skin lesions  EYES:denies blurred vision or double vision  HEENT: denies nasal congestion, sinus pain or ST  LUNGS: denies shortness of breath with exertion, denies cough  CARDIOVASCULAR: denies chest pain on exertion or at rest, denies palpitations  GI: denies abdominal pain,denies heartburn, denies n/v/d/c/blood in stool  : see HPI  MUSCULOSKELETAL: denies back pain  NEURO: denies headaches, denies LH/dizziness/syncope  PSYCHE: denies depression or anxiety  HEMATOLOGIC: denies hx of anemia  ENDOCRINE: denies thyroid history  ALL/ASTHMA: + hx of allergy    EXAM:   /74   Pulse 88   Temp 97.6 °F (36.4 °C) (Temporal)   Resp 16   Ht 5' 9\" (1.753 m)   Wt 193 lb 6.4 oz (87.7 kg)   LMP 02/01/2024 (Approximate)   BMI 28.56 kg/m²   Body mass index is 28.56 kg/m².   GENERAL: well developed, well nourished,in no apparent distress  SKIN: no rash  HEENT: atraumatic, normocephalic,ears and throat are clear  EYES:PERRLA, EOMI,conjunctiva are clear  NECK: supple,no adenopathy,no thyromegaly  BREAST: deferred to gynecology  LUNGS: clear to auscultation; no rhonchi, rales, or wheezing  CARDIO: RRR without murmur  GI: good BS's, no masses, HSM or tenderness  : deferred to gynecology  MUSCULOSKELETAL: FROM of the back  EXTREMITIES: no cyanosis, clubbing or edema  NEURO: Oriented times three,cranial nerves are intact,motor and sensory are grossly intact; 2+ knee reflexes bilaterally  VASCULAR: 2+ posterior tibial pulses bilaterally    ASSESSMENT AND PLAN:   Manisha Villarreal is a 49 year old female who presents for a complete physical exam.       Assessment & Plan  Annual physical exam  - Pap and pelvic deferred to gynecology. Last pap: 12/21/2023.   - Order for mammogram and dexascan per gynecology. Last mammogram: 03/22/2024.  - Self breast exam discussed.   - BP: at goal  - Pt' s weight is Body  mass index is 28.56 kg/m²., c/w overweight BMI, recommended low fat diet and aerobic exercise 30 minutes at least three times weekly.   - Last colonoscopy: 11/27/2024.       Laboratory examination ordered as part of a routine general medical examination    Orders:    CBC With Differential With Platelet; Future    Comp Metabolic Panel (14); Future    Lipid Panel; Future    TSH W Reflex To Free T4; Future    Screening for genitourinary condition    Orders:    Urinalysis with Culture Reflex; Future    Weight loss counseling, encounter for  Patient presents for follow up of weight management  - continue ozempic 2mg weekly, has been taking for 3 years  - will continue current regimen  - continue lifestyle changes  - will continue to monitor       Overweight (BMI 25.0-29.9)    Orders:    semaglutide (OZEMPIC, 2 MG/DOSE,) 8 MG/3ML Subcutaneous Solution Pen-injector; Inject 2 mg into the skin once a week.    Encounter for weight management         Increased heart rate  Patient has noticed intermittent increased HR/palpitations  - will check holter monitor to evaluate for arrhythmia, PAC's, PVC's  - further recommendations pending results   Orders:    CARD ZIO EXTENDED MONITOR 3-7 DAYS (CPT=93242/80926); Future    Intermittent palpitations    Orders:    CARD ZIO EXTENDED MONITOR 3-7 DAYS (CPT=93242/71791); Future    Insomnia, unspecified type  Patient has noticed insomnia  - will trial hydroxyzine at nighttime as this can also help with anxiety  - follow up 3-6mo pending response to treatment   Orders:    hydrOXYzine 25 MG Oral Tab; Take 1-2 tablets (25-50 mg total) by mouth nightly as needed for Anxiety (sleep).                The patient indicates understanding of these issues and agrees to the plan.      Health maintenance, will check:   Orders Placed This Encounter   Procedures    CBC With Differential With Platelet    Comp Metabolic Panel (14)    Lipid Panel    Urinalysis with Culture Reflex    TSH W Reflex To Free T4            Encounter Diagnoses   Name Primary?    Annual physical exam Yes    Laboratory examination ordered as part of a routine general medical examination     Screening for genitourinary condition     Weight loss counseling, encounter for     Overweight (BMI 25.0-29.9)     Encounter for weight management     Increased heart rate     Intermittent palpitations     Insomnia, unspecified type        Meds & Refills for this Visit:  Requested Prescriptions     Signed Prescriptions Disp Refills    hydrOXYzine 25 MG Oral Tab 30 tablet 0     Sig: Take 1-2 tablets (25-50 mg total) by mouth nightly as needed for Anxiety (sleep).    semaglutide (OZEMPIC, 2 MG/DOSE,) 8 MG/3ML Subcutaneous Solution Pen-injector 9 mL 1     Sig: Inject 2 mg into the skin once a week.       Imaging & Consults:  CARD ZIO EXTENDED MONITOR 3-7 DAYS (CPT=93242/73261)      Return in about 6 months (around 8/14/2025) for medication follow up, or sooner if needed.        Gemma Don MD  [facility]  02/14/25      Please note that portions of this note may have been completed with a voice recognition program. Efforts were made to edit the dictations but occasionally words are mis-transcribed. Thank you for your understanding.    The 21st Century Cures Act makes medical notes like these available to patients in the interest of transparency. Please be advised this is a medical document. Medical documents are intended to carry relevant information, facts as evident, and the clinical opinion of the practitioner. The medical note is intended as peer to peer communication and may appear blunt or direct. It is written in medical language and may contain abbreviations or verbiage that are unfamiliar. If there are any questions or concerns please contact the provider for clarification.          [1]   Allergies  Allergen Reactions    Azathioprine RASH     Nausea, diffuse, lip swelling, low BP    Bactrim [Sulfamethoxazole  W/Trimethoprim] HIVES    Cats Claw, Uncaria Tomentosa WHEEZING    Macrobid [Nitrofurantoin] OTHER (SEE COMMENTS)     Lung nodules    Gluten Flour      Gets very tired from gluten in foods and then will have stomach issues

## 2025-02-14 NOTE — PATIENT INSTRUCTIONS
Refill policies:      Allow 3 business days for refills; controlled substances may take longer.  Contact your pharmacy at least 5-7 business days prior to running out of medication and have them send an electronic request or submit through the \"request refill\" option thru your [employer] account. No need to do both, as multiple requests will create an automated [employer] message to notify of a denial for one of the duplicated requests, causing you undue confusion.   Refills are NOT addressed on weekends; covering physicians do not authorize routine medications on weekends.  No narcotics or controlled substances are refilled after noon on Fridays or by on call physicians.  By law, narcotics cannot be faxed or phoned into your pharmacy.  If your prescription is due for a refill, you may be due for a follow up appointment. Please call our office at [phone] to make an appointment or schedule an appointment via [employer].  To best provide you care, patients receiving routine medications need to be seen at least twice a year. Patients receiving narcotic/controlled substance medications need to be seen at least once every 3 months.  In the event that your preferred pharmacy does not have the requested medication in stock (ie Backordered), it is your responsibility to find another pharmacy that has the requested medication available. We will gladly send a new prescription to that pharmacy at your request.  controlled substances may not be able to be filled out of state due to license restrictions.  If you have a planned trip, it's best to call your pharmacy at least 5-7 business days to prevent any delays in your medication refill.    Scheduling Tests:    If your physician has ordered radiology tests such as MRI or CT scans, please contact Central Scheduling at [phone] right away to schedule the test.  Once scheduled, the [facility] Centralized Referral Team will work with your insurance carrier to obtain pre-certification or  prior authorization.  Depending on your insurance carrier, approval may take 3-10 days.  It is highly recommended patients assure they have received an authorization before having a test performed.  If test is done without insurance authorization, patient may be responsible for the entire amount billed.      Precertification and Prior Authorizations:  If your physician has recommended that you have a procedure or additional testing performed the [facility] Centralized Referral Team will contact your insurance carrier to obtain pre-certification or prior authorization.    You are strongly encouraged to contact your insurance carrier to verify that your procedure/test has been approved and is a COVERED benefit.  Although the [facility] Centralized Referral Team does its due diligence, the insurance carrier gives the disclaimer that \"Although the procedure is authorized, this does not guarantee payment.\"    Ultimately the patient is responsible for payment.   Thank you for your understanding in this matter.  Paperwork Completion:  If you require FMLA or disability paperwork for your recovery, please make sure to either drop it off or have it faxed to our office at [phone]. Be sure the form has your name and date of birth on it.  The form will be faxed to our Forms Department and they will complete it for you.  There is a 25$ fee for all forms that need to be filled out.  Please be aware there is a 10-14 day turnaround time.  You will need to sign a release of information (DAVE) form if your paperwork does not come with one.  You may call the Forms Department with any questions at [phone].  Their fax number is [phone].

## 2025-02-28 DIAGNOSIS — G47.00 INSOMNIA, UNSPECIFIED TYPE: ICD-10-CM

## 2025-03-03 RX ORDER — HYDROXYZINE HYDROCHLORIDE 25 MG/1
TABLET, FILM COATED ORAL NIGHTLY PRN
Qty: 90 TABLET | Refills: 0 | Status: CANCELLED | OUTPATIENT
Start: 2025-03-03

## 2025-03-06 DIAGNOSIS — G47.00 INSOMNIA, UNSPECIFIED TYPE: ICD-10-CM

## 2025-03-07 ENCOUNTER — OFFICE VISIT (OUTPATIENT)
Facility: CLINIC | Age: 50
End: 2025-03-07
Payer: COMMERCIAL

## 2025-03-07 VITALS
SYSTOLIC BLOOD PRESSURE: 96 MMHG | DIASTOLIC BLOOD PRESSURE: 70 MMHG | WEIGHT: 194 LBS | BODY MASS INDEX: 28.73 KG/M2 | HEIGHT: 69 IN

## 2025-03-07 DIAGNOSIS — N95.1 PERIMENOPAUSE: ICD-10-CM

## 2025-03-07 DIAGNOSIS — M31.30 GRANULOMATOSIS WITH POLYANGIITIS, UNSPECIFIED WHETHER RENAL INVOLVEMENT (HCC): ICD-10-CM

## 2025-03-07 DIAGNOSIS — Z00.00 ANNUAL PHYSICAL EXAM: ICD-10-CM

## 2025-03-07 DIAGNOSIS — Z30.41 ENCOUNTER FOR SURVEILLANCE OF CONTRACEPTIVE PILLS: ICD-10-CM

## 2025-03-07 DIAGNOSIS — R87.610 ASCUS OF CERVIX WITH NEGATIVE HIGH RISK HPV: ICD-10-CM

## 2025-03-07 DIAGNOSIS — Z12.4 CERVICAL CANCER SCREENING: Primary | ICD-10-CM

## 2025-03-07 PROCEDURE — 3074F SYST BP LT 130 MM HG: CPT | Performed by: OBSTETRICS & GYNECOLOGY

## 2025-03-07 PROCEDURE — 3008F BODY MASS INDEX DOCD: CPT | Performed by: OBSTETRICS & GYNECOLOGY

## 2025-03-07 PROCEDURE — 99386 PREV VISIT NEW AGE 40-64: CPT | Performed by: OBSTETRICS & GYNECOLOGY

## 2025-03-07 PROCEDURE — 88175 CYTOPATH C/V AUTO FLUID REDO: CPT | Performed by: OBSTETRICS & GYNECOLOGY

## 2025-03-07 PROCEDURE — 87624 HPV HI-RISK TYP POOLED RSLT: CPT | Performed by: OBSTETRICS & GYNECOLOGY

## 2025-03-07 PROCEDURE — 3078F DIAST BP <80 MM HG: CPT | Performed by: OBSTETRICS & GYNECOLOGY

## 2025-03-07 RX ORDER — ESTRADIOL 0.5 MG/1
0.5 TABLET ORAL DAILY
Qty: 90 TABLET | Refills: 1 | Status: SHIPPED | OUTPATIENT
Start: 2025-03-07

## 2025-03-07 RX ORDER — HYDROXYZINE HYDROCHLORIDE 25 MG/1
TABLET, FILM COATED ORAL NIGHTLY PRN
Qty: 30 TABLET | Refills: 0 | Status: SHIPPED | OUTPATIENT
Start: 2025-03-07

## 2025-03-07 RX ORDER — MEDROXYPROGESTERONE ACETATE 2.5 MG/1
2.5 TABLET ORAL DAILY
Qty: 90 TABLET | Refills: 3 | Status: SHIPPED | OUTPATIENT
Start: 2025-03-07

## 2025-03-07 NOTE — PROGRESS NOTES
GYN H&P     Genetic questionnaire reviewed with the patient and she will be referred for genetic counseling if the questionnaire had any positive results.    The [facility] intake form was also reviewed regarding contraception, menstrual periods, urinary health, and vaginal / sexual health    3/7/2025  9:35 AM    Chief Complaint   Patient presents with    Physical       HPI: Manisha is a 49 year old  Patient's last menstrual period was 2025 (approximate).  (contraception: continuous pill) here for her annual gyn exam.  She has complaints. Denies any pelvic or breast complaints. Satisfied with current contraception.       Not having any VM symptoms on inert OCP's - older sister in menopause around 50??    Previous encounters and chart reviewed.     OB History    Para Term  AB Living   4 3 3 0 1 3   SAB IAB Ectopic Multiple Live Births           3      # Outcome Date GA Lbr Jeffery/2nd Weight Sex Type Anes PTL Lv   4 Term 08 39w0d  8 lb 1 oz (3.657 kg) M    CHAD   3 Term 07 40w0d  9 lb (4.082 kg) F NORMAL SPONT   CHAD   2 Term 03 40w0d  8 lb 8 oz (3.856 kg) F VAGINAL RENU   CHAD   1 AB               Obstetric Comments   vdx3 (two girls, one boy)       GYN hx:   Menarche: 12  Period Cycle (Days): 90  (takes ocp continuously)  Period Duration (Days): 3  Period Flow: light  Use of Birth Control (if yes, specify type): OCP  Hx Prior Abnormal Pap: Yes  Pap Date: 23  Pap Result Notes: neg/neg 1/3/2023 Ascus/Neg; 2019 neg/neg; 2012 ASCUS/NEG HPV;    10/28/2011 NEG;  2010 ASCUS/NEG HPV  Follow Up Recommendation:       Past Medical History:    Anemia    Anesthesia complication    slow to wake after general anesthesia    Arthritis    Asthma (HCC)    exercise induced    Celiac disease (HCC)    Fatigue    Flatulence/gas pain/belching    Frequent UTI    Gallstones    H/O pleurisy    treated with course of steroids-followed by   Karissa/Pulmonologist-surgeon aware.    Mouth sores    Sleep disturbance    Stress    Vasculitis    \"wagners vasculitis\"    Visual impairment    glasses and contacts    Wears glasses     Past Surgical History:   Procedure Laterality Date    Colonoscopy      2014    Colonoscopy      Dilation/curettage,diagnostic      Excision turbinate,submucous  11/27/2020    Eye surgery Right 03/2015    repair of retinal tear    Laparoscopic cholecystectomy  10/10/2013    Procedure: LAPAROSCOPIC CHOLECYSTECTOMY;  Surgeon: Elpidio Messer MD;  Location: [facility] MAIN OR    Needle biopsy left      9/22 benign.    Other surgical history  02/2015    Bladder Mesh    Other surgical history Left 09/30/2022    breast bx (benign)    Repair of nasal septum  11/27/2020    Upper gi endoscopy,exam      biopsies      Allergies[1]  Medications Ordered Prior to Encounter[2]  Family History   Problem Relation Age of Onset    Heart Attack Mother 66        Acute MI    High Cholesterol Mother         Hypercholesterolemia    Heart Disease Mother     Obesity Mother     High Cholesterol Father         Hypercholesterolemia    Heart Disorder Father         Palpitations    Obesity Sister     Diabetes Brother     Diabetes Brother     Breast Cancer Other     Cancer Neg      Social History     Socioeconomic History    Marital status:      Spouse name: Not on file    Number of children: Not on file    Years of education: Not on file    Highest education level: Not on file   Occupational History    Not on file   Tobacco Use    Smoking status: Never    Smokeless tobacco: Never   Vaping Use    Vaping status: Never Used   Substance and Sexual Activity    Alcohol use: No    Drug use: No    Sexual activity: Yes     Partners: Male     Birth control/protection: OCP   Other Topics Concern     Service Not Asked    Blood Transfusions Not Asked    Caffeine Concern No     Comment: 1-2 per week    Occupational Exposure Not Asked    Hobby Hazards Not Asked    Sleep  Concern Not Asked    Stress Concern Not Asked    Weight Concern Not Asked    Special Diet Not Asked    Back Care Not Asked    Exercise Yes     Comment: silva 2 x a week    Bike Helmet Not Asked    Seat Belt Not Asked    Self-Exams Not Asked   Social History Narrative    Not on file     Social Drivers of Health     Food Insecurity: No Food Insecurity (11/22/2023)    Received from [facility]    Hunger Vital Sign     Worried About Running Out of Food in the Last Year: Never true     Ran Out of Food in the Last Year: Never true   Transportation Needs: No Transportation Needs (11/22/2023)    Received from [facility]    PRAPARE - Transportation     Lack of Transportation (Medical): No     Lack of Transportation (Non-Medical): No   Stress: Not on file   Housing Stability: Low Risk  (11/22/2023)    Received from [facility]    Housing Stability     What is your living situation today?: I have a steady place to live       ROS:     Review of Systems:  General: denies fevers, chills, fatigue and malaise.   Eyes: no visual changes, denies headaches  ENT: no complaints, denies earaches, runny nose, epistaxis, throat pain or sore throat  Respiratory: denies SOB, dyspnea, cough or wheezing  Cardiovascular: denies chest pain, palpitations, exercise intolerance   GI: denies abdominal pain, diarrhea, constipation  : no complaints, denies dysuria, increased urinary frequency. no dysmenorrhea, no menorrhagia, no dyspareunia  Hematological/lymphatic: denies history of excessive bleeding or bruising, denies dizziness, lightheadedness.   Breast: denies rashes, skin changes, pain, lumps or discharge   Psychiatric: denies depression, changes in sleep patterns, anxiety  Endocrine: denies hot or cold intolerance, mood changes   Neurological: denies changes in sight, smell, hearing or taste. Denies seizures or tremors  Immunological: denies allergies, denies anaphylaxis, or swollen lymph  nodes  Musculoskeletal: denies joint pain, morning stiffness, decreased range of motion         O BP 96/70   Ht 69\"   Wt 194 lb (88 kg)   LMP 01/01/2025 (Approximate)   BMI 28.65 kg/m²         Wt Readings from Last 6 Encounters:   03/07/25 194 lb (88 kg)   02/14/25 193 lb 6.4 oz (87.7 kg)   11/01/24 192 lb (87.1 kg)   10/29/24 189 lb (85.7 kg)   09/21/24 191 lb (86.6 kg)   07/07/24 195 lb (88.5 kg)     Exam:   GENERAL: well developed, well nourished, in no apparent distress, oriented.  SKIN: no rashes, no suspicious lesions  HEENT: normal  NECK: supple; no thyromegaly, no adenopathy  LUNGS: clear to auscultation  CARDIOVASCULAR: normal S1, S2, RRR  BREASTS: soft, nontender, no palpable masses or nodes, no nipple discharge, no skin changes, no axillary adenopathy  ABDOMEN:  soft, non distended; non tender, no masses  PELVIC: External Genitalia: Normal appearing, no lesions.    Vagina: normal pink mucosa, no lesions, normal clear discharge.    Bladder well supported.  No  anterior or posterior hernias    Cervix: multiparous, no lesions , No CMT     Uterus: AVAF mobile, non tender, normal size    Adnexa: non tender, no masses, normal size    Rectal: deferred  EXTREMITIES:  non tender without edema        A/P: Patient is 49 year old female with no complaints. Here for well woman exam.       Patient counseled on:    Diet/exercise.      Self Breast Exams     Safe sex practices / and living environment     Vaccines:  Annual Flu, Tdap +/- Gardasil not recommended (up to 45 yrs).      Pneumococcal at 65 yrs old, Shingles at 60 yrs old          Pap: neg/neg - Year:  repeat today to get a second   GC/Chlamydia:  na  Mammogram:  negative 2024    Dexa:  na  Colonoscopy / Cologuard:   2015, 2020, 2024 - now repeat every 10 years  Lipid / Cholesterol:    Lipid Panel [[id number]] (Abnormal)  Resulted: 03/15/24 1719, Result status: Final result  Resulting lab: [facility] ([facility])     Specimen  Information    ID Type Source Collected On   [id number] Blood -- 03/15/24 1225     Components    Component Value Reference Range Flag   Cholesterol, Total 193 <200 mg/dL --   Comment: Desirable  <200 mg/dL  Borderline  200-239 mg/dL  High      >=240 mg/dL     HDL Cholesterol 49 40 - 59 mg/dL --   Comment:      Interpretive Information:  An HDL cholesterol <40 mg/dL is low and constitutes a coronary heart disease risk factor. An HDL cholesterol >60 mg/dL is a negative risk factor for coronary heart disease.     Triglycerides 78 30 - 149 mg/dL --   Comment:      Reference interval for fasting triglycerides  Desirable: <150 mg/dL  Borderline: 150-199 mg/dL  High: 200-499 mg/dL  Very High: >=500 mg/dL       LDL Cholesterol 130 <100 mg/dL H High    Comment: Desirable <100 mg/dL  Borderline 100-129 mg/dL  High     >=130mg/dL     VLDL 14 0 - 30 mg/dL --   Non HDL Chol 144 <130 mg/dL H High    Comment:      Desirable  <130 mg/dL  Borderline  130-159 mg/dL  High        160-189 mg/dL      Very high >=190 mg/dL     Pt is counseled on the discontinuation of her current birth control in September when she turns 50 since she has started feeling hot flashes when being on the sugar pill on her BC regimen, nearing menopause soon, switch over to hormonal replacement therapy after the discontinuation of birth control. Pt also educated on taking vitamin D for immune system support. Hrt sent to pharmacy, pt understood and agreed to plan, no further questions or concerns.    -Barber Gonzalez [facility] This Visit:    Requested Prescriptions      No prescriptions requested or ordered in this encounter       1. Cervical cancer screening  - ThinPrep PAP Smear; Future  - Hpv High Risk , Thin Prep Collection; Future    2. ASCUS of cervix with negative high risk HPV    3. Annual physical exam    4. Granulomatosis with polyangiitis, unspecified whether renal involvement (HCC)    5. Encounter for surveillance of contraceptive pills    6.  Perimenopause    Debated whether to continue OCP's, stop all medication, start HRT after 5 packs of OCP's finished.  Will do the later and follow up after 3 months of HRT to see what periods are doing and if she feels better.      Return in about 8 months (around 11/7/2025) for Office Visit.    Taras Coleman MD   3/7/2025  9:35 AM       This note was created by [employer] voice recognition. Errors in content may be related to improper recognition by the system; efforts to review and correct have been done but errors may still exist. Please contact me with any questions.    Note to patient and family   The 21st Century Cures Act makes medical notes available to patients in the interest of transparency.  However, please be advised that this is a medical document.  It is intended as tbpl-bu-hayu communication.  It is written in medical language which may contain abbreviations or verbiage that are technical and unfamiliar.  It may appear blunt or direct.  Medical documents are intended to carry relevant information, facts as evident, and the clinical opinion of the practitioner.           [1]   Allergies  Allergen Reactions    Azathioprine RASH     Nausea, diffuse, lip swelling, low BP    Bactrim [Sulfamethoxazole W/Trimethoprim] HIVES    Cats Claw, Uncaria Tomentosa WHEEZING    Macrobid [Nitrofurantoin] OTHER (SEE COMMENTS)     Lung nodules    Gluten Flour      Gets very tired from gluten in foods and then will have stomach issues   [2]   Current Outpatient Medications on File Prior to Visit   Medication Sig Dispense Refill    hydrOXYzine 25 MG Oral Tab Take 1-2 tablets (25-50 mg total) by mouth nightly as needed for Anxiety (sleep). 30 tablet 0    semaglutide (OZEMPIC, 2 MG/DOSE,) 8 MG/3ML Subcutaneous Solution Pen-injector Inject 2 mg into the skin once a week. 9 mL 1    MIRABEGRON ER 50 MG Oral Tablet 24 Hr TAKE 1 TABLET DAILY 90 tablet 3    Estradiol (VAGIFEM) 10 MCG Vaginal Tab Place 10 mcg vaginally 3 (three)  times a week. 24 tablet 3    cyanocobalamin 500 MCG Oral Tab Take 1 tablet (500 mcg total) by mouth daily.      Levonorgest-Eth Estrad 91-Day (LOJAIMIESS) 0.1-0.02 & 0.01 MG Oral Tab TAKE 1 TABLET DAILY 91 tablet 2    Meloxicam 15 MG Oral Tab 1 tablet (15 mg total). Prn      Multiple Vitamin (MULTI-DAY VITAMINS) Oral Tab Take by mouth daily.      IRON OR daily.      tiZANidine 2 MG Oral Tab Take 1 tablet (2 mg total) by mouth. 1/2 tab prn      celecoxib 100 MG Oral Cap Take 1 capsule (100 mg total) by mouth as needed. Takes once daily      Acidophilus/Pectin Oral Cap Take 1 capsule by mouth daily. Takes prn      Cholecalciferol (VITAMIN D3) 5000 UNITS Oral Tab Take 1 tablet (5,000 Units total) by mouth daily.      cephalexin 500 MG Oral Cap Take 1 capsule (500 mg total) by mouth 3 (three) times daily. (Patient not taking: Reported on 3/7/2025) 21 capsule 0    valACYclovir 500 MG Oral Tab Take 1 tablet (500 mg total) by mouth 2 (two) times daily. (Patient not taking: Reported on 3/7/2025) 6 tablet 3    montelukast 10 MG Oral Tab TAKE 1 TABLET BY MOUTH EVERY DAY AT NIGHT (Patient not taking: Reported on 3/7/2025) 30 tablet 0     No current facility-administered medications on file prior to visit.

## 2025-03-07 NOTE — TELEPHONE ENCOUNTER
Last office visit: 2/14/25  Next office visit: 8/29/25  Last Refill:2/14/25    Requested Prescriptions     Pending Prescriptions Disp Refills    HYDROXYZINE 25 MG Oral Tab [Pharmacy Med Name: HYDROXYZINE HCL 25 MG TABLET] 30 tablet 0     Sig: TAKE 1-2 TABLETS BY MOUTH NIGHTLY AS NEEDED FOR ANXIETY (SLEEP).       Please authorize if acceptable.   Thank you!

## 2025-03-10 LAB — HPV E6+E7 MRNA CVX QL NAA+PROBE: NEGATIVE

## 2025-03-16 ENCOUNTER — HOSPITAL ENCOUNTER (OUTPATIENT)
Age: 50
Discharge: HOME | End: 2025-03-16
Payer: COMMERCIAL

## 2025-03-16 VITALS
OXYGEN SATURATION: 98 % | HEART RATE: 92 BPM | SYSTOLIC BLOOD PRESSURE: 112 MMHG | RESPIRATION RATE: 18 BRPM | TEMPERATURE: 98 F | DIASTOLIC BLOOD PRESSURE: 61 MMHG

## 2025-03-16 DIAGNOSIS — J01.00 ACUTE NON-RECURRENT MAXILLARY SINUSITIS: Primary | ICD-10-CM

## 2025-03-16 PROCEDURE — 99213 OFFICE O/P EST LOW 20 MIN: CPT | Performed by: NURSE PRACTITIONER

## 2025-03-16 RX ORDER — BENZONATATE 100 MG/1
100 CAPSULE ORAL 3 TIMES DAILY PRN
Qty: 10 CAPSULE | Refills: 0 | Status: SHIPPED | OUTPATIENT
Start: 2025-03-16

## 2025-03-16 RX ORDER — AMOXICILLIN 875 MG/1
875 TABLET, COATED ORAL 2 TIMES DAILY
Qty: 14 TABLET | Refills: 0 | Status: SHIPPED | OUTPATIENT
Start: 2025-03-16 | End: 2025-03-23

## 2025-03-16 NOTE — DISCHARGE INSTRUCTIONS
Take amoxicillin twice a day.  Use Tessalon as needed for cough.  Continue the over-the-counter remedies.  Drink plenty of fluids, water, hot tea with honey.  Cough drops.  Humidifier in your room.  Warm steam to your face.  Recheck with your primary doctor if no improvement after antibiotics

## 2025-03-16 NOTE — ED PROVIDER NOTES
Patient Seen in: [facility]      History     Chief Complaint   Patient presents with    Cough/URI    Sinus Problem    Stuffy Nose    Headache     Stated Complaint: Cough; sore throat; congested  Subjective:   49-year-old female with childhood asthma, vasculitis, chronic neck and shoulder pain presents from home.  Patient is here with concern for sinus infection.  States she has been stuffed up for about 2 weeks.  Complaining of nasal congestion, sinus pressure, postnasal drip, ear pressure and itching, cough, scratchy throat, headache.  No fever.  No COVID testing done at home.  Taking Mucinex, Flonase, Claritin for symptoms with minimal relief.  No history of recurrent sinusitis.  She is a non-smoker.  States she is flying to [place] for work tomorrow    The history is provided by the patient. No  was used.     Objective:   Past Medical History:    Anemia    Anesthesia complication    slow to wake after general anesthesia    Arthritis    Asthma (HCC)    exercise induced    Celiac disease (HCC)    Fatigue    Flatulence/gas pain/belching    Frequent UTI    Gallstones    H/O pleurisy    treated with course of steroids-followed by Dr Hancock/Pulmonologist-surgeon aware.    Mouth sores    Sleep disturbance    Stress    Vasculitis    \"wagners vasculitis\"    Visual impairment    glasses and contacts    Wears glasses            Past Surgical History:   Procedure Laterality Date    Colonoscopy      2014    Colonoscopy      Dilation/curettage,diagnostic      Excision turbinate,submucous  11/27/2020    Eye surgery Right 03/2015    repair of retinal tear    Laparoscopic cholecystectomy  10/10/2013    Procedure: LAPAROSCOPIC CHOLECYSTECTOMY;  Surgeon: Elpidio Messer MD;  Location:  MAIN OR    Needle biopsy left      9/22 benign.    Other surgical history  02/2015    Bladder Mesh    Other surgical history Left 09/30/2022    breast bx (benign)    Repair of nasal septum  11/27/2020     Upper gi endoscopy,exam      biopsies               Social History     Socioeconomic History    Marital status:    Tobacco Use    Smoking status: Never    Smokeless tobacco: Never   Vaping Use    Vaping status: Never Used   Substance and Sexual Activity    Alcohol use: No    Drug use: No    Sexual activity: Yes     Partners: Male     Birth control/protection: OCP   Other Topics Concern    Caffeine Concern No     Comment: 1-2 per week    Exercise Yes     Comment: silva 2 x a week     Social Drivers of Health     Food Insecurity: No Food Insecurity (11/22/2023)    Received from [facility]    Hunger Vital Sign     Worried About Running Out of Food in the Last Year: Never true     Ran Out of Food in the Last Year: Never true   Transportation Needs: No Transportation Needs (11/22/2023)    Received from [facility]    PRAPARE - Transportation     Lack of Transportation (Medical): No     Lack of Transportation (Non-Medical): No   Housing Stability: Low Risk  (11/22/2023)    Received from [facility]    Housing Stability     What is your living situation today?: I have a steady place to live            Review of Systems    Positive for stated complaint: Cough/URI, Sinus Problem, Stuffy Nose, and Headache    Other systems are as noted in HPI.  Constitutional and vital signs reviewed.      All other systems reviewed and negative except as noted above.    Physical Exam     ED Triage Vitals [03/16/25 1217]   /61   Pulse 92   Resp 18   Temp 98.3 °F (36.8 °C)   Temp src Oral   SpO2 98 %   O2 Device None (Room air)     Current:/61   Pulse 92   Temp 98.3 °F (36.8 °C) (Oral)   Resp 18   LMP 01/01/2025 (Approximate)   SpO2 98%     Physical Exam  Vitals and nursing note reviewed.   Constitutional:       General: She is not in acute distress.     Appearance: Normal appearance. She is not ill-appearing or toxic-appearing.   HENT:      Head: Normocephalic and atraumatic.       Right Ear: Tympanic membrane, ear canal and external ear normal.      Left Ear: Tympanic membrane, ear canal and external ear normal.      Nose: Congestion present.      Right Turbinates: Not enlarged.      Left Turbinates: Not enlarged.      Right Sinus: No maxillary sinus tenderness or frontal sinus tenderness.      Left Sinus: No maxillary sinus tenderness or frontal sinus tenderness.      Mouth/Throat:      Mouth: Mucous membranes are moist.      Pharynx: Oropharynx is clear. No pharyngeal swelling, posterior oropharyngeal erythema or postnasal drip.   Eyes:      Pupils: Pupils are equal, round, and reactive to light.   Cardiovascular:      Rate and Rhythm: Normal rate and regular rhythm.      Pulses: Normal pulses.   Pulmonary:      Effort: Pulmonary effort is normal. No respiratory distress.      Breath sounds: Normal breath sounds.      Comments: Lungs clear.  No adventitious lung sounds.  No distress.  No hypoxia.  Pulse ox 98% ra. Which is normal    Abdominal:      General: Abdomen is flat.      Palpations: Abdomen is soft.   Musculoskeletal:         General: No signs of injury. Normal range of motion.      Cervical back: Normal range of motion and neck supple.   Lymphadenopathy:      Cervical: No cervical adenopathy.   Skin:     General: Skin is warm and dry.      Capillary Refill: Capillary refill takes less than 2 seconds.   Neurological:      General: No focal deficit present.      Mental Status: She is alert and oriented to person, place, and time.      GCS: GCS eye subscore is 4. GCS verbal subscore is 5. GCS motor subscore is 6.   Psychiatric:         Mood and Affect: Mood normal.         Behavior: Behavior normal.         Thought Content: Thought content normal.         Judgment: Judgment normal.         ED Course   Radiology:  No results found.  Labs Reviewed - No data to display    MDM     Medical Decision Making  Differential diagnoses reflecting the complexity of care include: Viral versus  bacterial sinusitis, COVID, allergies  Persistent sinus symptoms for 2 weeks.  No relief with OTC remedies  Symptomatic on exam but nontoxic.  No fever.  No facial swelling or tenderness  COVID testing deferred given length of symptoms  Shared decision making with patient.  Agreeable to trial of antibiotics given length of symptoms    Plan of Care: Rx amoxicillin and Tessalon.  Continue OTC remedies.  Warm steam to face.  Follow-up with primary doctor if no improvement    Results and plan of care discussed with the patient/family. They are in agreement with discharge. They understand to follow up with their primary doctor or the referral physician for further evaluation, especially if no improvement.  Also discussed the limitations of immediate care, patient is aware that if symptoms are worse they should go to the emergency room. Verbal and written discharge instructions were given.     My independent interpretation of studies of: N/A   Diagnostic tests and medications considered but not ordered were: COVID test  Shared decision making was done by: Patient requesting cough medicine  Comorbidities that add complexity to management include: Childhood asthma, vasculitis  External chart review was done and was noted: Reviewed, noncontributory, no recent antibiotics  History obtained by an independent source was from: N/A   Discussions and management was done with: N/A  Social determinants of health that affect care: Flying to [place] for work tomorrow              Problems Addressed:  Acute non-recurrent maxillary sinusitis: acute illness or injury    Risk  OTC drugs.  Prescription drug management.        Disposition and Plan     Clinical Impression:  1. Acute non-recurrent maxillary sinusitis         Disposition:  Discharge  3/16/2025 12:17 pm    Follow-up:  Gemma Don MD  [address] [place] [id number]  [phone]                Medications Prescribed:  Current Discharge Medication List         START taking these medications    Details   amoxicillin 875 MG Oral Tab Take 1 tablet (875 mg total) by mouth 2 (two) times daily for 7 days.  Qty: 14 tablet, Refills: 0      benzonatate 100 MG Oral Cap Take 1 capsule (100 mg total) by mouth 3 (three) times daily as needed for cough.  Qty: 10 capsule, Refills: 0

## 2025-03-16 NOTE — ED INITIAL ASSESSMENT (HPI)
Pt c/o 2 weeks of stuffy nose, post nasal drip, headache , sinus pressure, stuffy ears and scratchy throat.

## 2025-03-22 ENCOUNTER — OFFICE VISIT (OUTPATIENT)
Dept: FAMILY MEDICINE CLINIC | Facility: CLINIC | Age: 50
End: 2025-03-22
Payer: COMMERCIAL

## 2025-03-22 VITALS
RESPIRATION RATE: 18 BRPM | BODY MASS INDEX: 28.71 KG/M2 | OXYGEN SATURATION: 98 % | SYSTOLIC BLOOD PRESSURE: 116 MMHG | DIASTOLIC BLOOD PRESSURE: 82 MMHG | HEART RATE: 100 BPM | TEMPERATURE: 98 F | WEIGHT: 193.81 LBS | HEIGHT: 69 IN

## 2025-03-22 DIAGNOSIS — J06.9 URI WITH COUGH AND CONGESTION: ICD-10-CM

## 2025-03-22 DIAGNOSIS — H60.503 ACUTE OTITIS EXTERNA OF BOTH EARS, UNSPECIFIED TYPE: ICD-10-CM

## 2025-03-22 DIAGNOSIS — H65.93 BILATERAL NON-SUPPURATIVE OTITIS MEDIA: Primary | ICD-10-CM

## 2025-03-22 PROCEDURE — 99213 OFFICE O/P EST LOW 20 MIN: CPT | Performed by: PHYSICIAN ASSISTANT

## 2025-03-22 PROCEDURE — 3008F BODY MASS INDEX DOCD: CPT | Performed by: PHYSICIAN ASSISTANT

## 2025-03-22 PROCEDURE — 3079F DIAST BP 80-89 MM HG: CPT | Performed by: PHYSICIAN ASSISTANT

## 2025-03-22 PROCEDURE — 3074F SYST BP LT 130 MM HG: CPT | Performed by: PHYSICIAN ASSISTANT

## 2025-03-22 RX ORDER — CEFDINIR 300 MG/1
300 CAPSULE ORAL 2 TIMES DAILY
Qty: 20 CAPSULE | Refills: 0 | Status: SHIPPED | OUTPATIENT
Start: 2025-03-22 | End: 2025-04-01

## 2025-03-22 RX ORDER — NEOMYCIN SULFATE, POLYMYXIN B SULFATE, HYDROCORTISONE 3.5; 10000; 1 MG/ML; [USP'U]/ML; MG/ML
4 SOLUTION/ DROPS AURICULAR (OTIC) 3 TIMES DAILY
Qty: 1 EACH | Refills: 0 | Status: SHIPPED | OUTPATIENT
Start: 2025-03-22 | End: 2025-03-29

## 2025-03-22 NOTE — PATIENT INSTRUCTIONS
Stop amoxicillin. Start cefdinir   Start ear drop   Rest   Push fluids   Tylenol or ibuprofen OTC as needed for pain/fever   Claritin or Zyrtec OTC once daily for nasal drainage  Flonase 1 spray each nostril twice daily for sinus pressure   Please follow up with PCP if no improvement or if symptoms worsen

## 2025-03-22 NOTE — PROGRESS NOTES
CHIEF COMPLAINT:     Chief Complaint   Patient presents with    Ear Problem     Congestion x 3 weeks, Left ear pain x 3 days        HPI:   Manisha Villarreal is a 49 year old female who presents to clinic today with complaints of b/l ear pain, left is worse. Patient has had sinus congestion for 3 weeks. Seen at  3/16/25 and put on amoxicillin for sinusitis. She has been taking antibiotic and sinus congestion has improved. Left ear pain and sensitivity to the touch. Right ear with crusting and some sensitivity to the touch. No discharge. Recently flew. Mild cough. No chest pain or SOB. No fever. No GI issues.     Current Outpatient Medications   Medication Sig Dispense Refill    cefdinir 300 MG Oral Cap Take 1 capsule (300 mg total) by mouth 2 (two) times daily for 10 days. 20 capsule 0    neomycin-polymyxin-hydrocortisone 3.5-66903-0 Otic Solution Place 4 drops into both ears 3 (three) times daily for 7 days. 1 each 0    amoxicillin 875 MG Oral Tab Take 1 tablet (875 mg total) by mouth 2 (two) times daily for 7 days. 14 tablet 0    benzonatate 100 MG Oral Cap Take 1 capsule (100 mg total) by mouth 3 (three) times daily as needed for cough. 10 capsule 0    HYDROXYZINE 25 MG Oral Tab TAKE 1-2 TABLETS BY MOUTH NIGHTLY AS NEEDED FOR ANXIETY (SLEEP). 30 tablet 0    estradiol (ESTRACE) 0.5 MG Oral Tab Take 1 tablet (0.5 mg total) by mouth daily. 90 tablet 1    medroxyPROGESTERone Acetate 2.5 MG Oral Tab Take 1 tablet (2.5 mg total) by mouth daily. 90 tablet 3    semaglutide (OZEMPIC, 2 MG/DOSE,) 8 MG/3ML Subcutaneous Solution Pen-injector Inject 2 mg into the skin once a week. 9 mL 1    MIRABEGRON ER 50 MG Oral Tablet 24 Hr TAKE 1 TABLET DAILY 90 tablet 3    cephalexin 500 MG Oral Cap Take 1 capsule (500 mg total) by mouth 3 (three) times daily. (Patient not taking: Reported on 3/7/2025) 21 capsule 0    cyanocobalamin 500 MCG Oral Tab Take 1 tablet (500 mcg total) by mouth daily.      valACYclovir 500 MG Oral Tab  Take 1 tablet (500 mg total) by mouth 2 (two) times daily. (Patient not taking: Reported on 3/7/2025) 6 tablet 3    Meloxicam 15 MG Oral Tab 1 tablet (15 mg total). Prn      Multiple Vitamin (MULTI-DAY VITAMINS) Oral Tab Take by mouth daily.      IRON OR daily.      tiZANidine 2 MG Oral Tab Take 1 tablet (2 mg total) by mouth. 1/2 tab prn      montelukast 10 MG Oral Tab TAKE 1 TABLET BY MOUTH EVERY DAY AT NIGHT (Patient not taking: Reported on 3/7/2025) 30 tablet 0    celecoxib 100 MG Oral Cap Take 1 capsule (100 mg total) by mouth as needed. Takes once daily      Acidophilus/Pectin Oral Cap Take 1 capsule by mouth daily. Takes prn      Cholecalciferol (VITAMIN D3) 5000 UNITS Oral Tab Take 1 tablet (5,000 Units total) by mouth daily.        Past Medical History:    Anemia    Anesthesia complication    slow to wake after general anesthesia    Arthritis    Asthma (HCC)    exercise induced    Celiac disease (HCC)    Fatigue    Flatulence/gas pain/belching    Frequent UTI    Gallstones    H/O pleurisy    treated with course of steroids-followed by Dr Hancock/Pulmonologist-surgeon aware.    Mouth sores    Sleep disturbance    Stress    Vasculitis    \"wagners vasculitis\"    Visual impairment    glasses and contacts    Wears glasses      Social History:  Social History     Socioeconomic History    Marital status:    Tobacco Use    Smoking status: Never    Smokeless tobacco: Never   Vaping Use    Vaping status: Never Used   Substance and Sexual Activity    Alcohol use: No    Drug use: No    Sexual activity: Yes     Partners: Male     Birth control/protection: OCP   Other Topics Concern    Caffeine Concern No     Comment: 1-2 per week    Exercise Yes     Comment: silva 2 x a week     Social Drivers of Health     Food Insecurity: No Food Insecurity (11/22/2023)    [facility]    Hunger Vital Sign     Worried About Running Out of Food in the Last Year: Never true     Ran Out of Food in the  Last Year: Never true   Transportation Needs: No Transportation Needs (11/22/2023)    Received from [facility]    PRAPARE - Transportation     Lack of Transportation (Medical): No     Lack of Transportation (Non-Medical): No   Housing Stability: Low Risk  (11/22/2023)    Received from [facility]    Housing Stability     What is your living situation today?: I have a steady place to live        REVIEW OF SYSTEMS:   GENERAL: See HPI  SKIN: no unusual skin lesions or rashes  HEENT: See HPI  LUNGS: No shortness of breath, or wheezing.  CARDIOVASCULAR: No chest pain, palpitations  GI: No N/V/C/D.  NEURO: denies headaches or dizziness    EXAM:   /82   Pulse 100   Temp 97.9 °F (36.6 °C) (Oral)   Resp 18   Ht 5' 9\" (1.753 m)   Wt 193 lb 12.8 oz (87.9 kg)   LMP 01/01/2025 (Approximate)   SpO2 98%   BMI 28.62 kg/m²   Physical Exam  Constitutional:       General: She is not in acute distress.     Appearance: Normal appearance.   HENT:      Head: Normocephalic.      Right Ear: Swelling (right ear canal) and tenderness (tragus) present. No mastoid tenderness. Tympanic membrane is injected.      Left Ear: Swelling (left ear canal) and tenderness (tragus) present. No mastoid tenderness. Tympanic membrane is erythematous and bulging.      Nose: Rhinorrhea present.      Mouth/Throat:      Mouth: Mucous membranes are moist.      Palate: Lesions (ulcer left side of soft palate) present.      Pharynx: Oropharynx is clear. Uvula midline. Postnasal drip present. No posterior oropharyngeal erythema.      Tonsils: No tonsillar exudate.   Eyes:      Conjunctiva/sclera: Conjunctivae normal.      Pupils: Pupils are equal, round, and reactive to light.   Cardiovascular:      Rate and Rhythm: Normal rate and regular rhythm.      Heart sounds: Normal heart sounds. No murmur heard.  Pulmonary:      Effort: Pulmonary effort is normal. No respiratory distress.      Breath sounds: Normal breath sounds. No  wheezing or rhonchi.   Chest:      Chest wall: No tenderness.   Musculoskeletal:      Cervical back: Normal range of motion and neck supple.   Lymphadenopathy:      Cervical: No cervical adenopathy.   Skin:     General: Skin is warm.      Findings: No rash.   Neurological:      Mental Status: She is alert and oriented to person, place, and time.       No results found for this or any previous visit (from the past 24 hours).    ASSESSMENT AND PLAN:   Manisha Villarreal is a 49 year old female who presents with ear problems symptoms are consistent with    ASSESSMENT:  Encounter Diagnoses   Name Primary?    Bilateral non-suppurative otitis media Yes    Acute otitis externa of both ears, unspecified type     URI with cough and congestion        PLAN: Meds as listed below.  Comfort measures as described in Patient Instructions    Meds & Refills for this Visit:  Requested Prescriptions     Signed Prescriptions Disp Refills    cefdinir 300 MG Oral Cap 20 capsule 0     Sig: Take 1 capsule (300 mg total) by mouth 2 (two) times daily for 10 days.    neomycin-polymyxin-hydrocortisone 3.5-23702-1 Otic Solution 1 each 0     Sig: Place 4 drops into both ears 3 (three) times daily for 7 days.         Risk and benefits of medication discussed.   If antibiotics prescribed, stressed importance of completing full course of antibiotic.       Patient voiced understand and is in agreement with treatment plan.    Patient Instructions   Stop amoxicillin. Start cefdinir   Start ear drop   Rest   Push fluids   Tylenol or ibuprofen OTC as needed for pain/fever   Claritin or Zyrtec OTC once daily for nasal drainage  Flonase 1 spray each nostril twice daily for sinus pressure   Please follow up with PCP if no improvement or if symptoms worsen

## 2025-03-25 DIAGNOSIS — G47.00 INSOMNIA, UNSPECIFIED TYPE: ICD-10-CM

## 2025-03-25 NOTE — TELEPHONE ENCOUNTER
Last Office Visit: 2/14/25  Next Office Visit: 8/29/25  Last Refill:37/25  Medication Quantity Refills Start End   HYDROXYZINE 25 MG Oral Tab 30 tablet 0 3/7/2025 --   Sig:   TAKE 1-2 TABLETS BY MOUTH NIGHTLY AS NEEDED FOR ANXIETY (SLEEP).       Pharmacy is requesting 180 tabs.    Please authorize if acceptable.   Thank you!

## 2025-03-26 RX ORDER — HYDROXYZINE HYDROCHLORIDE 25 MG/1
TABLET, FILM COATED ORAL NIGHTLY PRN
Qty: 30 TABLET | Refills: 5 | Status: SHIPPED | OUTPATIENT
Start: 2025-03-26

## 2025-04-25 ENCOUNTER — TELEPHONE (OUTPATIENT)
Dept: UROLOGY | Facility: CLINIC | Age: 50
End: 2025-04-25

## 2025-04-25 ENCOUNTER — LAB ENCOUNTER (OUTPATIENT)
Dept: LAB | Facility: HOSPITAL | Age: 50
End: 2025-04-25
Attending: OBSTETRICS & GYNECOLOGY
Payer: COMMERCIAL

## 2025-04-25 DIAGNOSIS — R30.0 DYSURIA: ICD-10-CM

## 2025-04-25 DIAGNOSIS — R30.0 DYSURIA: Primary | ICD-10-CM

## 2025-04-25 PROCEDURE — 87186 SC STD MICRODIL/AGAR DIL: CPT

## 2025-04-25 PROCEDURE — 87086 URINE CULTURE/COLONY COUNT: CPT

## 2025-04-25 PROCEDURE — 87077 CULTURE AEROBIC IDENTIFY: CPT

## 2025-04-25 RX ORDER — CEPHALEXIN 500 MG/1
500 CAPSULE ORAL 3 TIMES DAILY
Qty: 21 CAPSULE | Refills: 0 | Status: SHIPPED | OUTPATIENT
Start: 2025-04-25

## 2025-04-25 NOTE — TELEPHONE ENCOUNTER
Telephone call received from patient.     Patient complains of UTI signs and symptoms including urgency, frequency, dysuria x 7 days    Denies fever    Allergies and previous cultures reviewed    Hx incomplete emptying:  N    Using Estrace: Y     Recent ucxs: (Past 12 months)  7/7/24-<10K gram pos  5/16/24->100K e coli-Tx keflex    Discussed with RADHA Mon keflex 500mg TID x 7 days    Urine culture ordered, will test @  lab    Empiric antibiotics sent to patient's preferred pharmacy     Encouraged PO hydration    All questions answered    Encouraged to call if symptoms worsen or fail to improve     Patient understands and agrees to plan

## 2025-06-26 ENCOUNTER — TELEPHONE (OUTPATIENT)
Dept: UROLOGY | Facility: CLINIC | Age: 50
End: 2025-06-26

## 2025-06-26 ENCOUNTER — LAB ENCOUNTER (OUTPATIENT)
Dept: LAB | Age: 50
End: 2025-06-26
Attending: OBSTETRICS & GYNECOLOGY
Payer: COMMERCIAL

## 2025-06-26 DIAGNOSIS — R39.15 URINARY URGENCY: Primary | ICD-10-CM

## 2025-06-26 DIAGNOSIS — R39.15 URINARY URGENCY: ICD-10-CM

## 2025-06-26 PROCEDURE — 87086 URINE CULTURE/COLONY COUNT: CPT

## 2025-06-26 RX ORDER — CEPHALEXIN 500 MG/1
500 CAPSULE ORAL 3 TIMES DAILY
Qty: 21 CAPSULE | Refills: 0 | Status: SHIPPED | OUTPATIENT
Start: 2025-06-26

## 2025-06-26 NOTE — TELEPHONE ENCOUNTER
.Telephone call received from patient.     Patient complains of UTI signs and symptoms including +urgency, + hesitancy x 2 days    Denies fever    Allergies and previous cultures reviewed    Hx incomplete emptying: N    Using Estrace: n, stopped about 2 weeks ago    Recent ucxs: (Past 12 months)  04/25/25 >100K E Coli Keflex 500mg TID x7d  7/7/24-<10K gram pos  5/16/24->100K e coli-Tx keflex    Ordered empiric Keflex 500mg PO TID x 7 days per last visit notes 12/3/24  Urine culture ordered, will test @ [facility] today    Empiric antibiotics sent to patient's preferred pharmacy     Encouraged PO hydration  Encouraged to resume Estrace as directed.  Reviewed if she had another UTI, she will need sooner f/u scheduled with Dr. Hanson.  Next f/u planned for December    All questions answered    Encouraged to call if symptoms worsen or fail to improve     Patient understands and agrees to plan

## 2025-08-02 DIAGNOSIS — G47.00 INSOMNIA, UNSPECIFIED TYPE: ICD-10-CM

## 2025-08-04 RX ORDER — HYDROXYZINE HYDROCHLORIDE 25 MG/1
TABLET, FILM COATED ORAL NIGHTLY PRN
Qty: 30 TABLET | Refills: 0 | Status: SHIPPED | OUTPATIENT
Start: 2025-08-04

## 2025-08-22 DIAGNOSIS — G47.00 INSOMNIA, UNSPECIFIED TYPE: ICD-10-CM

## 2025-08-25 ENCOUNTER — LAB ENCOUNTER (OUTPATIENT)
Dept: LAB | Age: 50
End: 2025-08-25
Attending: STUDENT IN AN ORGANIZED HEALTH CARE EDUCATION/TRAINING PROGRAM

## 2025-08-25 DIAGNOSIS — Z13.89 SCREENING FOR GENITOURINARY CONDITION: ICD-10-CM

## 2025-08-25 DIAGNOSIS — Z00.00 LABORATORY EXAMINATION ORDERED AS PART OF A ROUTINE GENERAL MEDICAL EXAMINATION: ICD-10-CM

## 2025-08-25 LAB
ALBUMIN SERPL-MCNC: 4.1 G/DL (ref 3.2–4.8)
ALBUMIN/GLOB SERPL: 1.4 (ref 1–2)
ALP LIVER SERPL-CCNC: 124 U/L (ref 39–100)
ALT SERPL-CCNC: 30 U/L (ref 10–49)
ANION GAP SERPL CALC-SCNC: 15 MMOL/L (ref 0–18)
AST SERPL-CCNC: 26 U/L (ref ?–34)
BASOPHILS # BLD AUTO: 0.06 X10(3) UL (ref 0–0.2)
BASOPHILS NFR BLD AUTO: 0.7 %
BILIRUB SERPL-MCNC: 0.4 MG/DL (ref 0.3–1.2)
BILIRUB UR QL STRIP.AUTO: NEGATIVE
BUN BLD-MCNC: 7 MG/DL (ref 9–23)
CALCIUM BLD-MCNC: 9.5 MG/DL (ref 8.7–10.6)
CHLORIDE SERPL-SCNC: 104 MMOL/L (ref 98–112)
CHOLEST SERPL-MCNC: 205 MG/DL (ref ?–200)
CLARITY UR REFRACT.AUTO: CLEAR
CO2 SERPL-SCNC: 22 MMOL/L (ref 21–32)
CREAT BLD-MCNC: 0.73 MG/DL (ref 0.55–1.02)
EGFRCR SERPLBLD CKD-EPI 2021: 101 ML/MIN/1.73M2 (ref 60–?)
EOSINOPHIL # BLD AUTO: 0.23 X10(3) UL (ref 0–0.7)
EOSINOPHIL NFR BLD AUTO: 2.5 %
ERYTHROCYTE [DISTWIDTH] IN BLOOD BY AUTOMATED COUNT: 13.6 %
FASTING PATIENT LIPID ANSWER: YES
FASTING STATUS PATIENT QL REPORTED: YES
GLOBULIN PLAS-MCNC: 2.9 G/DL (ref 2–3.5)
GLUCOSE BLD-MCNC: 88 MG/DL (ref 70–99)
GLUCOSE UR STRIP.AUTO-MCNC: NORMAL MG/DL
HCT VFR BLD AUTO: 39.8 % (ref 35–48)
HDLC SERPL-MCNC: 58 MG/DL (ref 40–59)
HGB BLD-MCNC: 13.4 G/DL (ref 12–16)
IMM GRANULOCYTES # BLD AUTO: 0.02 X10(3) UL (ref 0–1)
IMM GRANULOCYTES NFR BLD: 0.2 %
KETONES UR STRIP.AUTO-MCNC: NEGATIVE MG/DL
LDLC SERPL CALC-MCNC: 132 MG/DL (ref ?–100)
LEUKOCYTE ESTERASE UR QL STRIP.AUTO: 500
LYMPHOCYTES # BLD AUTO: 3.36 X10(3) UL (ref 1–4)
LYMPHOCYTES NFR BLD AUTO: 36.5 %
MCH RBC QN AUTO: 30.5 PG (ref 26–34)
MCHC RBC AUTO-ENTMCNC: 33.7 G/DL (ref 31–37)
MCV RBC AUTO: 90.5 FL (ref 80–100)
MONOCYTES # BLD AUTO: 0.94 X10(3) UL (ref 0.1–1)
MONOCYTES NFR BLD AUTO: 10.2 %
NEUTROPHILS # BLD AUTO: 4.59 X10 (3) UL (ref 1.5–7.7)
NEUTROPHILS # BLD AUTO: 4.59 X10(3) UL (ref 1.5–7.7)
NEUTROPHILS NFR BLD AUTO: 49.9 %
NITRITE UR QL STRIP.AUTO: NEGATIVE
NONHDLC SERPL-MCNC: 147 MG/DL (ref ?–130)
OSMOLALITY SERPL CALC.SUM OF ELEC: 289 MOSM/KG (ref 275–295)
PH UR STRIP.AUTO: 6 (ref 5–8)
PLATELET # BLD AUTO: 431 10(3)UL (ref 150–450)
POTASSIUM SERPL-SCNC: 3.9 MMOL/L (ref 3.5–5.1)
PROT SERPL-MCNC: 7 G/DL (ref 5.7–8.2)
PROT UR STRIP.AUTO-MCNC: NEGATIVE MG/DL
RBC # BLD AUTO: 4.4 X10(6)UL (ref 3.8–5.3)
RBC UR QL AUTO: NEGATIVE
SODIUM SERPL-SCNC: 141 MMOL/L (ref 136–145)
SP GR UR STRIP.AUTO: 1.01 (ref 1–1.03)
TRIGL SERPL-MCNC: 83 MG/DL (ref 30–149)
TSI SER-ACNC: 2.69 UIU/ML (ref 0.55–4.78)
UROBILINOGEN UR STRIP.AUTO-MCNC: NORMAL MG/DL
VLDLC SERPL CALC-MCNC: 15 MG/DL (ref 0–30)
WBC # BLD AUTO: 9.2 X10(3) UL (ref 4–11)
WBC #/AREA URNS AUTO: >50 /HPF

## 2025-08-25 PROCEDURE — 81001 URINALYSIS AUTO W/SCOPE: CPT | Performed by: STUDENT IN AN ORGANIZED HEALTH CARE EDUCATION/TRAINING PROGRAM

## 2025-08-25 PROCEDURE — 87086 URINE CULTURE/COLONY COUNT: CPT | Performed by: STUDENT IN AN ORGANIZED HEALTH CARE EDUCATION/TRAINING PROGRAM

## 2025-08-25 PROCEDURE — 80061 LIPID PANEL: CPT | Performed by: STUDENT IN AN ORGANIZED HEALTH CARE EDUCATION/TRAINING PROGRAM

## 2025-08-25 PROCEDURE — 80050 GENERAL HEALTH PANEL: CPT | Performed by: STUDENT IN AN ORGANIZED HEALTH CARE EDUCATION/TRAINING PROGRAM

## 2025-08-25 RX ORDER — HYDROXYZINE HYDROCHLORIDE 25 MG/1
TABLET, FILM COATED ORAL NIGHTLY PRN
Qty: 30 TABLET | Refills: 0 | OUTPATIENT
Start: 2025-08-25

## 2025-08-29 ENCOUNTER — OFFICE VISIT (OUTPATIENT)
Dept: FAMILY MEDICINE CLINIC | Facility: CLINIC | Age: 50
End: 2025-08-29

## 2025-08-29 VITALS
HEART RATE: 104 BPM | TEMPERATURE: 97 F | BODY MASS INDEX: 29.03 KG/M2 | OXYGEN SATURATION: 98 % | WEIGHT: 196 LBS | DIASTOLIC BLOOD PRESSURE: 70 MMHG | HEIGHT: 69 IN | SYSTOLIC BLOOD PRESSURE: 118 MMHG | RESPIRATION RATE: 18 BRPM

## 2025-08-29 DIAGNOSIS — R20.0 NUMBNESS OF LEFT FOOT: ICD-10-CM

## 2025-08-29 DIAGNOSIS — E66.3 OVERWEIGHT WITH BODY MASS INDEX (BMI) OF 28 TO 28.9 IN ADULT: ICD-10-CM

## 2025-08-29 DIAGNOSIS — Z76.89 ENCOUNTER FOR WEIGHT MANAGEMENT: Primary | ICD-10-CM

## 2025-08-29 DIAGNOSIS — E78.2 MIXED HYPERLIPIDEMIA: ICD-10-CM

## 2025-08-29 DIAGNOSIS — G47.00 INSOMNIA, UNSPECIFIED TYPE: ICD-10-CM

## 2025-08-29 RX ORDER — SEMAGLUTIDE 2.68 MG/ML
2 INJECTION, SOLUTION SUBCUTANEOUS WEEKLY
Qty: 9 ML | Refills: 1 | Status: SHIPPED | OUTPATIENT
Start: 2025-08-29

## 2025-08-29 RX ORDER — HYDROXYZINE HYDROCHLORIDE 25 MG/1
25 TABLET, FILM COATED ORAL NIGHTLY PRN
Qty: 30 TABLET | Refills: 2 | Status: SHIPPED | OUTPATIENT
Start: 2025-08-29

## (undated) DEVICE — SYRINGE MED 20ML STD CLR PLAS LL TIP N CTRL

## (undated) DEVICE — MEDI-VAC NON-CONDUCTIVE SUCTION TUBING: Brand: CARDINAL HEALTH

## (undated) DEVICE — SHEATH 1912040 5PK ES2 STRYKER 4MM/0DEG: Brand: ENDO-SCRUB®

## (undated) DEVICE — SINGLE USE BIOPSY VALVE MAJ-210: Brand: SINGLE USE BIOPSY VALVE (STERILE)

## (undated) DEVICE — VISION PROBE ADAPTER AND SUCTION ADAPTER

## (undated) DEVICE — GLV SURG SZ 7.5 THK10MIL WHT ISOLEX SYN

## (undated) DEVICE — AIRLIFE™ MISTY MAX 10™ NEBULIZER WITH 7 FOOT (2.1 M) FEMALE U/CONNECT-IT CRUSH RESISTANT OXYGEN TUBING, BAFFLED TEE ADAPTER (22 MM I.D./ 22 MM O.D.), MOUTHPIECE AND 6 INCH (15 CM) FLEXTUBE: Brand: AIRLIFE™

## (undated) DEVICE — NEEDLE ASPIR 22GA L40MM WRK L70CM SYR VLV

## (undated) DEVICE — GLOVE SURG SENSICARE SZ 7-1/2

## (undated) DEVICE — GAMMEX® NON-LATEX PI ORTHO SIZE 6.5, STERILE POLYISOPRENE POWDER-FREE SURGICAL GLOVE: Brand: GAMMEX

## (undated) DEVICE — NASAL ACCESSORY: Brand: MEDLINE INDUSTRIES, INC.

## (undated) DEVICE — Device

## (undated) DEVICE — HEAD & NECK: Brand: MEDLINE INDUSTRIES, INC.

## (undated) DEVICE — BIOPSY NEEDLE, 23G: Brand: FLEXISION

## (undated) DEVICE — SOL  .9 1000ML BTL

## (undated) DEVICE — 60 ML SYRINGE REGULAR TIP: Brand: MONOJECT

## (undated) DEVICE — KENDALL SCD EXPRESS SLEEVES, KNEE LENGTH, MEDIUM: Brand: KENDALL SCD

## (undated) DEVICE — T & A CDS: Brand: MEDLINE INDUSTRIES, INC.

## (undated) DEVICE — BOWLS UTILITY 16OZ

## (undated) DEVICE — SUTURE CHROMIC GUT 4-0 P-3

## (undated) DEVICE — SUTURE PLAIN GUT 4-0 SC-1

## (undated) DEVICE — SHEATH 1912041 5PK ES2 STRYKER 4MM/30DEG: Brand: ENDO-SCRUB®

## (undated) DEVICE — CAUTERY PENCIL

## (undated) DEVICE — EYE PADSSTERILENOT MADE WITH NATURAL RUBBER LATEXSINGLE USE ONLYDO NOT USE IF PACKAGE OPENED OR DAMAGED: Brand: CARDINAL HEALTH

## (undated) DEVICE — SWIVEL CONNECTOR

## (undated) DEVICE — ELECTROSURGICAL SUCTION COAGULATOR, 10FR

## (undated) DEVICE — Device: Brand: ERBE

## (undated) DEVICE — SYRINGE 10ML LL CONTRL SYRINGE

## (undated) DEVICE — SUCTION CANISTER, 3000CC,SAFELINER: Brand: DEROYAL

## (undated) DEVICE — SUTURE SILK 2-0 623H

## (undated) DEVICE — SINGLE USE SUCTION VALVE MAJ-209: Brand: SINGLE USE SUCTION VALVE (STERILE)

## (undated) DEVICE — AIRLIFE™ VOLUMETRIC INCENTIVE SPIROMETER, 4000 ML: Brand: AIRLIFE

## (undated) DEVICE — MEDI-VAC SUCTION HANDLE REGULAR CAPACITY: Brand: CARDINAL HEALTH

## (undated) DEVICE — STERIS KITS

## (undated) DEVICE — CAUTERY BLADE 2IN INS E1455

## (undated) DEVICE — TRAP,MUCUS SPECIMEN, 80CC: Brand: MEDLINE

## (undated) DEVICE — FRCP RJ3 GP W/NDL

## (undated) NOTE — ED AVS SNAPSHOT
Leopold Mosses   MRN: [id number]    Department:  [facility] Emergency Department   Date of Visit:  3/9/2019           Disclosure     Insurance plans vary and the physician(s) referred by the ER may not be covered by your plan.  Please contact tell this physician (or your personal doctor if your instructions are to return to your personal doctor) about any new or lasting problems. The primary care or specialist physician will see patients referred from the [facility] Emergency Department.  Ashley Casas

## (undated) NOTE — Clinical Note
Ignacio Freitas saw Lo Schmidt in the office today, I was thinking about possibly using stimulant medication (ie. Phentermine to help with weight loss), would you be ok with this? Given patients medical hx. JARED Gerber (weight loss clinic)

## (undated) NOTE — ED AVS SNAPSHOT
[facility]  Duane Catching    [address] [place] [address]    Phone:  [phone]    Fax:  [phone]           Bryon Batista   MRN: [id number]    Department:  [facility]   Date of Visi These medications were sent to [facility]/PHARMACY #[id number]- [address] [facility] [address]  [phone], [address] [address]     Phone:  [phone]    - ibuprofen 600 MG Tabs  - levofloxacin 500 MG Tabs            Disch to your personal doctor) about any new or lasting problems. The primary care or specialist physician will see patients referred from the [facility]. Follow-up care is at the discretion of that Physician.     IF THERE IS ANY CHANGE OR WORSENING O - If you have concerns related to behavioral health issues or thoughts of harming yourself, contact [address] [place] [address] at [phone].     - If you don’t have insurance, Ellie Blandon has partnered with [employer]ne PATIENT STATED HISTORY: (As transcribed by Technologist)  Sinus and chest congestion for over 14 days. FINDINGS:    Cardiac size and pulmonary vasculature are within normal limits. No pleural effusions. No pneumothorax.                  MyChart

## (undated) NOTE — ED AVS SNAPSHOT
[facility] Emergency Department    [place]  [address] [address]    Phone:  [phone]    Fax:  [address]   MRN: [id number]    Department:  [facility] Emergency Department   Date of Visit: doctor. Please ask your health care provider, pharmacist or nurse if you have any questions regarding your home medications, including potential side effects.               Medication List      CHANGE how you take these medications     predniSONE 20 MG Tabs complete it. Thank you! You were examined and treated today on an urgent basis only. This was not a substitute for ongoing medical care. Often, one Emergency Department visit does not uncover every injury or illness.  If you have been referred to a pr Dexter Coleman [address] Fernandez (Lisa Flatter) [address] [place] ([facility] (027) [phone] [address] ([address]) [phone]                Additional Information       We are concerned

## (undated) NOTE — MR AVS SNAPSHOT
After Visit Summary   3/29/2017    Jalynangela Miltonmariodeborah    MRN: [id number]           Diagnoses this Visit     Wegener's granulomatosis [facility])    -  Primary       Allergies     Azathioprine Rash    Nausea, diffuse, lip swelling, low BP    Gluten Flour HCT 40.7  34.0-50.0  % Final    .0  150.0-450.0  10(3)uL Final    MCV 91.9  81.0-100.0  fL Final    MCH 30.5  27.0-33.2  pg Final    MCHC 33.2  31.0-37.0  g/dL Final    RDW 14.1  11.5-16.0  % Final    RDW-SD 47.4 (H) 35.1-46.3  fL Final    Neutroph

## (undated) NOTE — MR AVS SNAPSHOT
After Visit Summary   4/12/2017    Scott Mckeon    MRN: [id number]           Diagnoses this Visit     Wegener's granulomatosis [place] [facility])    -  Primary       Allergies     Azathioprine Rash    Nausea, diffuse, lip swelling, low BP    Gluten Flour MCH 30.2  27.0-33.2  pg Final    MCHC 33.5  31.0-37.0  g/dL Final    RDW 13.8  11.5-16.0  % Final    RDW-SD 45.8  35.1-46.3  fL Final    Neutrophil Absolute Prelim 5.97  1.30-6.70  x10 (3) uL Final    Neutrophil Absolute 5.97  1.30-6.70  x10(3) uL Final